# Patient Record
Sex: MALE | Race: WHITE | NOT HISPANIC OR LATINO | Employment: OTHER | URBAN - METROPOLITAN AREA
[De-identification: names, ages, dates, MRNs, and addresses within clinical notes are randomized per-mention and may not be internally consistent; named-entity substitution may affect disease eponyms.]

---

## 2017-08-24 DIAGNOSIS — Z11.59 NEED FOR HEPATITIS C SCREENING TEST: ICD-10-CM

## 2018-01-11 ENCOUNTER — DOCUMENTATION ONLY (OUTPATIENT)
Dept: FAMILY MEDICINE | Facility: CLINIC | Age: 53
End: 2018-01-11

## 2018-01-11 NOTE — PROGRESS NOTES
Pre-Visit Chart Review  For Appointment Scheduled on 1-17-18    Health Maintenance Due   Topic Date Due    Hepatitis C Screening  1965    Influenza Vaccine  08/01/2017    Lipid Panel  12/07/2017

## 2018-01-17 ENCOUNTER — DOCUMENTATION ONLY (OUTPATIENT)
Dept: FAMILY MEDICINE | Facility: CLINIC | Age: 53
End: 2018-01-17

## 2018-01-17 ENCOUNTER — TELEPHONE (OUTPATIENT)
Dept: FAMILY MEDICINE | Facility: CLINIC | Age: 53
End: 2018-01-17

## 2018-01-17 NOTE — TELEPHONE ENCOUNTER
Pt notified at 8:10 that clinic is closed due to the weather and that we will be calling back to reschedule appt. Pt asked if appt would be today and explained to him that it will most likely be a different day.  Pt voiced understanding.

## 2018-01-17 NOTE — PROGRESS NOTES
Pre-Visit Chart Review  For Appointment Scheduled on 1-18-18    Health Maintenance Due   Topic Date Due    Hepatitis C Screening  1965    Influenza Vaccine  08/01/2017    Lipid Panel  12/07/2017

## 2018-01-18 ENCOUNTER — OFFICE VISIT (OUTPATIENT)
Dept: FAMILY MEDICINE | Facility: CLINIC | Age: 53
End: 2018-01-18
Attending: FAMILY MEDICINE
Payer: COMMERCIAL

## 2018-01-18 VITALS
HEIGHT: 69 IN | WEIGHT: 170.19 LBS | DIASTOLIC BLOOD PRESSURE: 74 MMHG | HEART RATE: 54 BPM | RESPIRATION RATE: 12 BRPM | TEMPERATURE: 98 F | SYSTOLIC BLOOD PRESSURE: 130 MMHG | OXYGEN SATURATION: 98 % | BODY MASS INDEX: 25.21 KG/M2

## 2018-01-18 DIAGNOSIS — F41.9 ANXIETY: Primary | ICD-10-CM

## 2018-01-18 DIAGNOSIS — F40.240 CLAUSTROPHOBIA: ICD-10-CM

## 2018-01-18 PROCEDURE — 99213 OFFICE O/P EST LOW 20 MIN: CPT | Mod: S$GLB,,, | Performed by: FAMILY MEDICINE

## 2018-01-18 PROCEDURE — 99999 PR PBB SHADOW E&M-EST. PATIENT-LVL IV: CPT | Mod: PBBFAC,,, | Performed by: FAMILY MEDICINE

## 2018-01-18 RX ORDER — LEVETIRACETAM 500 MG/1
TABLET ORAL
COMMUNITY
Start: 2017-11-03 | End: 2018-01-18

## 2018-01-18 RX ORDER — MULTIVITAMIN
1 TABLET ORAL DAILY
COMMUNITY
End: 2022-02-15

## 2018-01-18 RX ORDER — ALPRAZOLAM 0.25 MG/1
0.25 TABLET ORAL 3 TIMES DAILY PRN
Qty: 40 TABLET | Refills: 0 | Status: SHIPPED | OUTPATIENT
Start: 2018-01-18 | End: 2019-06-25 | Stop reason: SDUPTHER

## 2018-01-18 NOTE — PATIENT INSTRUCTIONS
Controlling High Blood Pressure  High blood pressure (hypertension) is often called the silent killer. This is because many people who have it dont know it. High blood pressure is defined as 140/90 mm Hg or higher. Know your blood pressure and remember to check it regularly. Doing so can save your life. Here are some things you can do to help control your blood pressure.    Choose heart-healthy foods  · Select low-salt, low-fat foods. Limit sodium intake to 2,400 mg per day or the amount suggested by your healthcare provider.  · Limit canned, dried, cured, packaged, and fast foods. These can contain a lot of salt.  · Eat 8 to 10 servings of fruits and vegetables every day.  · Choose lean meats, fish, or chicken.  · Eat whole-grain pasta, brown rice, and beans.  · Eat 2 to 3 servings of low-fat or fat-free dairy products.  · Ask your doctor about the DASH eating plan. This plan helps reduce blood pressure.  · When you go to a restaurant, ask that your meal be prepared with no added salt.  Maintain a healthy weight  · Ask your healthcare provider how many calories to eat a day. Then stick to that number.  · Ask your healthcare provider what weight range is healthiest for you. If you are overweight, a weight loss of only 3% to 5% of your body weight can help lower blood pressure. Generally, a good weight loss goal is to lose 10% of your body weight in a year.  · Limit snacks and sweets.  · Get regular exercise.  Get up and get active  · Choose activities you enjoy. Find ones you can do with friends or family. This includes bicycling, dancing, walking, and jogging.  · Park farther away from building entrances.  · Use stairs instead of the elevator.  · When you can, walk or bike instead of driving.  · Ney leaves, garden, or do household repairs.  · Be active at a moderate to vigorous level of physical activity for at least 40 minutes for a minimum of 3 to 4 days a week.   Manage stress  · Make time to relax and enjoy  life. Find time to laugh.  · Communicate your concerns with your loved ones and your healthcare provider.  · Visit with family and friends, and keep up with hobbies.  Limit alcohol and quit smoking  · Men should have no more than 2 drinks per day.  · Women should have no more than 1 drink per day.  · Talk with your healthcare provider about quitting smoking. Smoking significantly increases your risk for heart disease and stroke. Ask your healthcare provider about community smoking cessation programs and other options.  Medicines  If lifestyle changes arent enough, your healthcare provider may prescribe high blood pressure medicine. Take all medicines as prescribed. If you have any questions about your medicines, ask your healthcare provider before stopping or changing them.   Date Last Reviewed: 4/27/2016  © 8023-8385 [employer]. [address] [place] [address], [place] [address]. All rights reserved. This information is not intended as a substitute for professional medical care. Always follow your healthcare professional's instructions.
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
## 2018-02-08 ENCOUNTER — OFFICE VISIT (OUTPATIENT)
Dept: FAMILY MEDICINE | Facility: CLINIC | Age: 53
End: 2018-02-08
Payer: COMMERCIAL

## 2018-02-08 VITALS
HEART RATE: 75 BPM | BODY MASS INDEX: 25.53 KG/M2 | RESPIRATION RATE: 20 BRPM | DIASTOLIC BLOOD PRESSURE: 69 MMHG | HEIGHT: 69 IN | SYSTOLIC BLOOD PRESSURE: 116 MMHG | WEIGHT: 172.38 LBS | TEMPERATURE: 100 F

## 2018-02-08 DIAGNOSIS — J10.1 INFLUENZA A: Primary | ICD-10-CM

## 2018-02-08 LAB
CTP QC/QA: YES
FLUAV AG NPH QL: POSITIVE
FLUBV AG NPH QL: NEGATIVE

## 2018-02-08 PROCEDURE — 99214 OFFICE O/P EST MOD 30 MIN: CPT | Mod: S$GLB,,, | Performed by: FAMILY MEDICINE

## 2018-02-08 PROCEDURE — 3008F BODY MASS INDEX DOCD: CPT | Mod: S$GLB,,, | Performed by: FAMILY MEDICINE

## 2018-02-08 PROCEDURE — 99999 PR PBB SHADOW E&M-EST. PATIENT-LVL III: CPT | Mod: PBBFAC,,, | Performed by: FAMILY MEDICINE

## 2018-02-08 PROCEDURE — 87804 INFLUENZA ASSAY W/OPTIC: CPT | Mod: 59,QW,S$GLB, | Performed by: FAMILY MEDICINE

## 2018-02-08 RX ORDER — OSELTAMIVIR PHOSPHATE 75 MG/1
75 CAPSULE ORAL 2 TIMES DAILY
Qty: 10 CAPSULE | Refills: 0 | Status: SHIPPED | OUTPATIENT
Start: 2018-02-08 | End: 2018-02-13

## 2018-02-08 NOTE — PROGRESS NOTES
Subjective:       Patient ID: Stefano Donahue Jr. is a 53 y.o. male.    Chief Complaint: Influenza    Son has flu now.      Influenza   This is a new problem. The current episode started yesterday. The problem occurs constantly. The problem has been unchanged. Associated symptoms include chills, congestion, coughing, fatigue, a fever, myalgias and a sore throat. Pertinent negatives include no abdominal pain, chest pain, nausea, numbness or rash.     Review of Systems   Constitutional: Positive for chills, fatigue and fever.   HENT: Positive for congestion and sore throat.    Respiratory: Positive for cough. Negative for shortness of breath.    Cardiovascular: Negative for chest pain.   Gastrointestinal: Negative for abdominal pain and nausea.   Musculoskeletal: Positive for myalgias.   Skin: Negative for rash.   Neurological: Negative for numbness.   All other systems reviewed and are negative.      Objective:      Physical Exam   Constitutional: He appears well-developed. No distress.   HENT:   Right Ear: Tympanic membrane normal.   Left Ear: Tympanic membrane normal.   Nose: Mucosal edema present.   Mouth/Throat: Posterior oropharyngeal erythema present.   Neck: Neck supple.   Cardiovascular: Normal rate and regular rhythm.    No murmur heard.  Pulmonary/Chest: Effort normal and breath sounds normal. He has no wheezes. He has no rales.   Lymphadenopathy:     He has no cervical adenopathy.   Skin: Skin is warm and dry.       Assessment:           Plan:     Stefano was seen today for influenza.    Diagnoses and all orders for this visit:    Influenza A  -     POCT Influenza A/B    Other orders  -     oseltamivir (TAMIFLU) 75 MG capsule; Take 1 capsule (75 mg total) by mouth 2 (two) times daily.

## 2018-02-09 ENCOUNTER — TELEPHONE (OUTPATIENT)
Dept: FAMILY MEDICINE | Facility: CLINIC | Age: 53
End: 2018-02-09

## 2018-02-09 NOTE — TELEPHONE ENCOUNTER
----- Message from Yolis Michael sent at 2/9/2018  4:20 PM CST -----  Contact: spouse  Kandis Donahue (spouse) [phone], Calling about the Tamiflu the patient was prescribed on 2/8/18. Returning the nurse's call. Please advise. Thanks.

## 2018-02-09 NOTE — TELEPHONE ENCOUNTER
----- Message from Adrián Chris sent at 2/9/2018 11:30 AM CST -----  Contact: wife Kanids  Wife Kandis  has a question regarding the tamiflu, please call back at [phone].

## 2018-02-10 NOTE — TELEPHONE ENCOUNTER
----- Message from Brittney Waller sent at 2/9/2018 12:41 PM CST -----  Contact: Patient  Patient is calling to find out the results of his test from yesterday.  Call Back#[phone]  Thanks

## 2019-06-24 ENCOUNTER — DOCUMENTATION ONLY (OUTPATIENT)
Dept: FAMILY MEDICINE | Facility: CLINIC | Age: 54
End: 2019-06-24

## 2019-06-24 NOTE — PROGRESS NOTES
Pre-Visit Chart Review  For Appointment Scheduled on 6-25-19    Health Maintenance Due   Topic Date Due    Hepatitis C Screening  1965    Lipid Panel  06/20/2019

## 2019-06-25 ENCOUNTER — OFFICE VISIT (OUTPATIENT)
Dept: FAMILY MEDICINE | Facility: CLINIC | Age: 54
End: 2019-06-25
Attending: FAMILY MEDICINE
Payer: COMMERCIAL

## 2019-06-25 VITALS
BODY MASS INDEX: 25.57 KG/M2 | TEMPERATURE: 99 F | WEIGHT: 172.63 LBS | RESPIRATION RATE: 12 BRPM | DIASTOLIC BLOOD PRESSURE: 70 MMHG | HEART RATE: 67 BPM | SYSTOLIC BLOOD PRESSURE: 130 MMHG | OXYGEN SATURATION: 95 % | HEIGHT: 69 IN

## 2019-06-25 DIAGNOSIS — F40.240 CLAUSTROPHOBIA: ICD-10-CM

## 2019-06-25 DIAGNOSIS — F41.9 ANXIETY: Primary | ICD-10-CM

## 2019-06-25 DIAGNOSIS — E78.5 HYPERLIPIDEMIA, UNSPECIFIED HYPERLIPIDEMIA TYPE: ICD-10-CM

## 2019-06-25 DIAGNOSIS — Z23 IMMUNIZATION DUE: ICD-10-CM

## 2019-06-25 PROCEDURE — 3008F BODY MASS INDEX DOCD: CPT | Mod: CPTII,S$GLB,, | Performed by: FAMILY MEDICINE

## 2019-06-25 PROCEDURE — 3008F PR BODY MASS INDEX (BMI) DOCUMENTED: ICD-10-PCS | Mod: CPTII,S$GLB,, | Performed by: FAMILY MEDICINE

## 2019-06-25 PROCEDURE — 99214 PR OFFICE/OUTPT VISIT, EST, LEVL IV, 30-39 MIN: ICD-10-PCS | Mod: S$GLB,,, | Performed by: FAMILY MEDICINE

## 2019-06-25 PROCEDURE — 99999 PR PBB SHADOW E&M-EST. PATIENT-LVL III: ICD-10-PCS | Mod: PBBFAC,,, | Performed by: FAMILY MEDICINE

## 2019-06-25 PROCEDURE — 99999 PR PBB SHADOW E&M-EST. PATIENT-LVL III: CPT | Mod: PBBFAC,,, | Performed by: FAMILY MEDICINE

## 2019-06-25 PROCEDURE — 99214 OFFICE O/P EST MOD 30 MIN: CPT | Mod: S$GLB,,, | Performed by: FAMILY MEDICINE

## 2019-06-25 RX ORDER — ALPRAZOLAM 0.25 MG/1
0.25 TABLET ORAL 3 TIMES DAILY PRN
Qty: 40 TABLET | Refills: 0 | Status: SHIPPED | OUTPATIENT
Start: 2019-06-25 | End: 2020-09-01
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
## 2020-07-13 ENCOUNTER — TELEPHONE (OUTPATIENT)
Dept: FAMILY MEDICINE | Facility: CLINIC | Age: 55
End: 2020-07-13

## 2020-07-13 NOTE — TELEPHONE ENCOUNTER
----- Message from Lenore Vann sent at 7/13/2020 11:29 AM CDT -----  Type: Needs Medical Advice  Who Called:  patient  Best Call Back Number: [phone]  Additional Information: patient requesting orders for stress test. Please give call back

## 2020-07-13 NOTE — TELEPHONE ENCOUNTER
Patient says it's been more than 10 years since he had a stress test. Wants one done for preventative medicine to make sure there is nothing wrong with his heart.

## 2020-09-01 ENCOUNTER — OFFICE VISIT (OUTPATIENT)
Dept: FAMILY MEDICINE | Facility: CLINIC | Age: 55
End: 2020-09-01
Payer: COMMERCIAL

## 2020-09-01 VITALS
DIASTOLIC BLOOD PRESSURE: 74 MMHG | WEIGHT: 179.88 LBS | OXYGEN SATURATION: 98 % | SYSTOLIC BLOOD PRESSURE: 116 MMHG | TEMPERATURE: 98 F | HEART RATE: 65 BPM | HEIGHT: 69 IN | BODY MASS INDEX: 26.64 KG/M2

## 2020-09-01 DIAGNOSIS — Z76.0 MEDICATION REFILL: ICD-10-CM

## 2020-09-01 DIAGNOSIS — F41.9 ANXIETY: ICD-10-CM

## 2020-09-01 DIAGNOSIS — F40.240 CLAUSTROPHOBIA: Primary | ICD-10-CM

## 2020-09-01 PROCEDURE — 3008F BODY MASS INDEX DOCD: CPT | Mod: CPTII,S$GLB,, | Performed by: STUDENT IN AN ORGANIZED HEALTH CARE EDUCATION/TRAINING PROGRAM

## 2020-09-01 PROCEDURE — 99999 PR PBB SHADOW E&M-EST. PATIENT-LVL IV: CPT | Mod: PBBFAC,,, | Performed by: STUDENT IN AN ORGANIZED HEALTH CARE EDUCATION/TRAINING PROGRAM

## 2020-09-01 PROCEDURE — 99213 PR OFFICE/OUTPT VISIT, EST, LEVL III, 20-29 MIN: ICD-10-PCS | Mod: S$GLB,,, | Performed by: STUDENT IN AN ORGANIZED HEALTH CARE EDUCATION/TRAINING PROGRAM

## 2020-09-01 PROCEDURE — 99213 OFFICE O/P EST LOW 20 MIN: CPT | Mod: S$GLB,,, | Performed by: STUDENT IN AN ORGANIZED HEALTH CARE EDUCATION/TRAINING PROGRAM

## 2020-09-01 PROCEDURE — 99999 PR PBB SHADOW E&M-EST. PATIENT-LVL IV: ICD-10-PCS | Mod: PBBFAC,,, | Performed by: STUDENT IN AN ORGANIZED HEALTH CARE EDUCATION/TRAINING PROGRAM

## 2020-09-01 PROCEDURE — 3008F PR BODY MASS INDEX (BMI) DOCUMENTED: ICD-10-PCS | Mod: CPTII,S$GLB,, | Performed by: STUDENT IN AN ORGANIZED HEALTH CARE EDUCATION/TRAINING PROGRAM

## 2020-09-01 RX ORDER — ALPRAZOLAM 0.25 MG/1
TABLET ORAL 3 TIMES DAILY
COMMUNITY
End: 2020-09-01 | Stop reason: SDUPTHER

## 2020-09-01 RX ORDER — ALPRAZOLAM 0.25 MG/1
0.25 TABLET ORAL 3 TIMES DAILY PRN
Qty: 21 TABLET | Refills: 0 | Status: SHIPPED | OUTPATIENT
Start: 2020-09-01 | End: 2021-08-04 | Stop reason: SDUPTHER
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
## 2021-05-06 ENCOUNTER — PATIENT MESSAGE (OUTPATIENT)
Dept: RESEARCH | Facility: HOSPITAL | Age: 56
End: 2021-05-06

## 2021-05-10 ENCOUNTER — TELEPHONE (OUTPATIENT)
Dept: FAMILY MEDICINE | Facility: CLINIC | Age: 56
End: 2021-05-10

## 2021-05-11 ENCOUNTER — OFFICE VISIT (OUTPATIENT)
Dept: FAMILY MEDICINE | Facility: CLINIC | Age: 56
End: 2021-05-11
Payer: COMMERCIAL

## 2021-05-11 VITALS
SYSTOLIC BLOOD PRESSURE: 138 MMHG | DIASTOLIC BLOOD PRESSURE: 80 MMHG | TEMPERATURE: 98 F | HEIGHT: 69 IN | WEIGHT: 184.06 LBS | OXYGEN SATURATION: 97 % | HEART RATE: 58 BPM | BODY MASS INDEX: 27.26 KG/M2

## 2021-05-11 DIAGNOSIS — H93.12 RINGING IN EAR, LEFT: Primary | ICD-10-CM

## 2021-05-11 DIAGNOSIS — H91.90 PERCEIVED HEARING CHANGES: ICD-10-CM

## 2021-05-11 PROCEDURE — 99213 PR OFFICE/OUTPT VISIT, EST, LEVL III, 20-29 MIN: ICD-10-PCS | Mod: S$GLB,,, | Performed by: NURSE PRACTITIONER

## 2021-05-11 PROCEDURE — 99999 PR PBB SHADOW E&M-EST. PATIENT-LVL IV: ICD-10-PCS | Mod: PBBFAC,,, | Performed by: NURSE PRACTITIONER

## 2021-05-11 PROCEDURE — 3008F PR BODY MASS INDEX (BMI) DOCUMENTED: ICD-10-PCS | Mod: CPTII,S$GLB,, | Performed by: NURSE PRACTITIONER

## 2021-05-11 PROCEDURE — 1126F AMNT PAIN NOTED NONE PRSNT: CPT | Mod: S$GLB,,, | Performed by: NURSE PRACTITIONER

## 2021-05-11 PROCEDURE — 3008F BODY MASS INDEX DOCD: CPT | Mod: CPTII,S$GLB,, | Performed by: NURSE PRACTITIONER

## 2021-05-11 PROCEDURE — 1126F PR PAIN SEVERITY QUANTIFIED, NO PAIN PRESENT: ICD-10-PCS | Mod: S$GLB,,, | Performed by: NURSE PRACTITIONER

## 2021-05-11 PROCEDURE — 99213 OFFICE O/P EST LOW 20 MIN: CPT | Mod: S$GLB,,, | Performed by: NURSE PRACTITIONER

## 2021-05-11 PROCEDURE — 99999 PR PBB SHADOW E&M-EST. PATIENT-LVL IV: CPT | Mod: PBBFAC,,, | Performed by: NURSE PRACTITIONER

## 2021-05-11 RX ORDER — LEVETIRACETAM 500 MG/1
500 TABLET ORAL DAILY
COMMUNITY
Start: 2021-03-08

## 2021-08-04 ENCOUNTER — TELEPHONE (OUTPATIENT)
Dept: FAMILY MEDICINE | Facility: CLINIC | Age: 56
End: 2021-08-04

## 2021-08-04 DIAGNOSIS — Z76.0 MEDICATION REFILL: ICD-10-CM

## 2021-08-04 DIAGNOSIS — F41.9 ANXIETY: ICD-10-CM

## 2021-08-04 DIAGNOSIS — F40.240 CLAUSTROPHOBIA: ICD-10-CM

## 2021-08-04 RX ORDER — ALPRAZOLAM 0.25 MG/1
0.25 TABLET ORAL 2 TIMES DAILY PRN
Qty: 30 TABLET | Refills: 0 | Status: SHIPPED | OUTPATIENT
Start: 2021-08-04 | End: 2021-08-04

## 2021-08-04 RX ORDER — ALPRAZOLAM 0.25 MG/1
0.25 TABLET ORAL 2 TIMES DAILY PRN
Qty: 30 TABLET | Refills: 0 | Status: ON HOLD | OUTPATIENT
Start: 2021-08-04 | End: 2022-01-17 | Stop reason: HOSPADM

## 2022-01-04 ENCOUNTER — TELEPHONE (OUTPATIENT)
Dept: FAMILY MEDICINE | Facility: CLINIC | Age: 57
End: 2022-01-04
Payer: COMMERCIAL

## 2022-01-04 PROCEDURE — 99285 EMERGENCY DEPT VISIT HI MDM: CPT | Mod: 25

## 2022-01-04 PROCEDURE — 96374 THER/PROPH/DIAG INJ IV PUSH: CPT

## 2022-01-04 RX ORDER — ALBUTEROL SULFATE 90 UG/1
4 AEROSOL, METERED RESPIRATORY (INHALATION) ONCE
Status: COMPLETED | OUTPATIENT
Start: 2022-01-05 | End: 2022-01-05

## 2022-01-04 RX ORDER — DEXAMETHASONE SODIUM PHOSPHATE 4 MG/ML
8 INJECTION, SOLUTION INTRA-ARTICULAR; INTRALESIONAL; INTRAMUSCULAR; INTRAVENOUS; SOFT TISSUE
Status: COMPLETED | OUTPATIENT
Start: 2022-01-05 | End: 2022-01-05

## 2022-01-04 NOTE — TELEPHONE ENCOUNTER
I spoke with pt via phone, pt states that he tested positive on 12/28/2021 and he is still had symptoms on Sunday. I advised him that this visit would have to be virtual. All understanding verbalized.     ----- Message from Syl Up sent at 1/4/2022  9:36 AM CST -----  Type:  Same Day Appointment Request    Caller is requesting a same day appointment.  Caller declined first available appointment listed below.      Name of Caller:  patient   When is the first available appointment?  Unavailable   Symptoms:  ear ache   Best Call Back Number:     Additional Information:   please advise-thank you

## 2022-01-05 ENCOUNTER — HOSPITAL ENCOUNTER (INPATIENT)
Facility: HOSPITAL | Age: 57
LOS: 12 days | Discharge: HOME | DRG: 177 | End: 2022-01-17
Attending: EMERGENCY MEDICINE | Admitting: INTERNAL MEDICINE
Payer: COMMERCIAL

## 2022-01-05 DIAGNOSIS — U07.1 PNEUMONIA DUE TO COVID-19 VIRUS: Primary | ICD-10-CM

## 2022-01-05 DIAGNOSIS — U07.1 COVID-19: ICD-10-CM

## 2022-01-05 DIAGNOSIS — J12.82 PNEUMONIA DUE TO COVID-19 VIRUS: Primary | ICD-10-CM

## 2022-01-05 PROBLEM — R56.9 SEIZURES: Status: ACTIVE | Noted: 2022-01-05

## 2022-01-05 PROBLEM — J96.01 ACUTE RESPIRATORY FAILURE WITH HYPOXIA: Status: ACTIVE | Noted: 2022-01-05

## 2022-01-05 LAB
ALBUMIN SERPL BCP-MCNC: 3.4 G/DL (ref 3.5–5.2)
ALP SERPL-CCNC: 118 U/L (ref 55–135)
ALT SERPL W/O P-5'-P-CCNC: 66 U/L (ref 10–44)
ANION GAP SERPL CALC-SCNC: 12 MMOL/L (ref 8–16)
AST SERPL-CCNC: 74 U/L (ref 10–40)
BACTERIA #/AREA URNS HPF: NORMAL /HPF
BASOPHILS # BLD AUTO: 0 K/UL (ref 0–0.2)
BASOPHILS NFR BLD: 0 % (ref 0–1.9)
BILIRUB SERPL-MCNC: 0.5 MG/DL (ref 0.1–1)
BILIRUB UR QL STRIP: NEGATIVE
BNP SERPL-MCNC: <10 PG/ML (ref 0–99)
BUN SERPL-MCNC: 18 MG/DL (ref 6–20)
CALCIUM SERPL-MCNC: 8.8 MG/DL (ref 8.7–10.5)
CHLORIDE SERPL-SCNC: 100 MMOL/L (ref 95–110)
CLARITY UR: CLEAR
CO2 SERPL-SCNC: 22 MMOL/L (ref 23–29)
COLOR UR: YELLOW
CREAT SERPL-MCNC: 1 MG/DL (ref 0.5–1.4)
CRP SERPL-MCNC: 76.2 MG/L (ref 0–8.2)
D DIMER PPP IA.FEU-MCNC: 0.5 MG/L FEU
DIFFERENTIAL METHOD: ABNORMAL
EOSINOPHIL # BLD AUTO: 0 K/UL (ref 0–0.5)
EOSINOPHIL NFR BLD: 0 % (ref 0–8)
ERYTHROCYTE [DISTWIDTH] IN BLOOD BY AUTOMATED COUNT: 11.9 % (ref 11.5–14.5)
ERYTHROCYTE [SEDIMENTATION RATE] IN BLOOD BY WESTERGREN METHOD: 45 MM/HR (ref 0–10)
EST. GFR  (AFRICAN AMERICAN): >60 ML/MIN/1.73 M^2
EST. GFR  (NON AFRICAN AMERICAN): >60 ML/MIN/1.73 M^2
FERRITIN SERPL-MCNC: 2439 NG/ML (ref 20–300)
GLUCOSE SERPL-MCNC: 146 MG/DL (ref 70–110)
GLUCOSE UR QL STRIP: ABNORMAL
HCT VFR BLD AUTO: 44.7 % (ref 40–54)
HGB BLD-MCNC: 15.1 G/DL (ref 14–18)
HGB UR QL STRIP: ABNORMAL
HYALINE CASTS #/AREA URNS LPF: 0 /LPF
IMM GRANULOCYTES # BLD AUTO: 0.01 K/UL (ref 0–0.04)
IMM GRANULOCYTES NFR BLD AUTO: 0.3 % (ref 0–0.5)
KETONES UR QL STRIP: NEGATIVE
LACTATE SERPL-SCNC: 1.1 MMOL/L (ref 0.5–2.2)
LDH SERPL L TO P-CCNC: 466 U/L (ref 110–260)
LEUKOCYTE ESTERASE UR QL STRIP: NEGATIVE
LYMPHOCYTES # BLD AUTO: 0.9 K/UL (ref 1–4.8)
LYMPHOCYTES NFR BLD: 25.3 % (ref 18–48)
MCH RBC QN AUTO: 29.2 PG (ref 27–31)
MCHC RBC AUTO-ENTMCNC: 33.8 G/DL (ref 32–36)
MCV RBC AUTO: 87 FL (ref 82–98)
MICROSCOPIC COMMENT: NORMAL
MONOCYTES # BLD AUTO: 0.3 K/UL (ref 0.3–1)
MONOCYTES NFR BLD: 8.8 % (ref 4–15)
NEUTROPHILS # BLD AUTO: 2.4 K/UL (ref 1.8–7.7)
NEUTROPHILS NFR BLD: 65.6 % (ref 38–73)
NITRITE UR QL STRIP: NEGATIVE
NRBC BLD-RTO: 0 /100 WBC
PH UR STRIP: 7 [PH] (ref 5–8)
PLATELET # BLD AUTO: 162 K/UL (ref 150–450)
PMV BLD AUTO: 9.1 FL (ref 9.2–12.9)
POTASSIUM SERPL-SCNC: 3.6 MMOL/L (ref 3.5–5.1)
PROCALCITONIN SERPL IA-MCNC: 0.07 NG/ML
PROT SERPL-MCNC: 7.2 G/DL (ref 6–8.4)
PROT UR QL STRIP: ABNORMAL
RBC # BLD AUTO: 5.17 M/UL (ref 4.6–6.2)
RBC #/AREA URNS HPF: 1 /HPF (ref 0–4)
SODIUM SERPL-SCNC: 134 MMOL/L (ref 136–145)
SP GR UR STRIP: 1.02 (ref 1–1.03)
SQUAMOUS #/AREA URNS HPF: 1 /HPF
TROPONIN I SERPL DL<=0.01 NG/ML-MCNC: 0.01 NG/ML (ref 0–0.03)
URN SPEC COLLECT METH UR: ABNORMAL
UROBILINOGEN UR STRIP-ACNC: ABNORMAL EU/DL
WBC # BLD AUTO: 3.63 K/UL (ref 3.9–12.7)
WBC #/AREA URNS HPF: 5 /HPF (ref 0–5)

## 2022-01-05 PROCEDURE — 25000242 PHARM REV CODE 250 ALT 637 W/ HCPCS: Performed by: PHYSICIAN ASSISTANT

## 2022-01-05 PROCEDURE — 84145 PROCALCITONIN (PCT): CPT | Performed by: HOSPITALIST

## 2022-01-05 PROCEDURE — 93005 ELECTROCARDIOGRAM TRACING: CPT

## 2022-01-05 PROCEDURE — 83880 ASSAY OF NATRIURETIC PEPTIDE: CPT | Performed by: NURSE PRACTITIONER

## 2022-01-05 PROCEDURE — 83615 LACTATE (LD) (LDH) ENZYME: CPT | Performed by: NURSE PRACTITIONER

## 2022-01-05 PROCEDURE — 27000221 HC OXYGEN, UP TO 24 HOURS

## 2022-01-05 PROCEDURE — 93010 EKG 12-LEAD: ICD-10-PCS | Mod: ,,, | Performed by: INTERNAL MEDICINE

## 2022-01-05 PROCEDURE — 84484 ASSAY OF TROPONIN QUANT: CPT | Performed by: NURSE PRACTITIONER

## 2022-01-05 PROCEDURE — 12000002 HC ACUTE/MED SURGE SEMI-PRIVATE ROOM

## 2022-01-05 PROCEDURE — 94640 AIRWAY INHALATION TREATMENT: CPT

## 2022-01-05 PROCEDURE — 86140 C-REACTIVE PROTEIN: CPT | Performed by: HOSPITALIST

## 2022-01-05 PROCEDURE — 83605 ASSAY OF LACTIC ACID: CPT | Performed by: NURSE PRACTITIONER

## 2022-01-05 PROCEDURE — 85651 RBC SED RATE NONAUTOMATED: CPT | Performed by: NURSE PRACTITIONER

## 2022-01-05 PROCEDURE — 36415 COLL VENOUS BLD VENIPUNCTURE: CPT | Performed by: HOSPITALIST

## 2022-01-05 PROCEDURE — 85379 FIBRIN DEGRADATION QUANT: CPT | Performed by: HOSPITALIST

## 2022-01-05 PROCEDURE — 93010 ELECTROCARDIOGRAM REPORT: CPT | Mod: ,,, | Performed by: INTERNAL MEDICINE

## 2022-01-05 PROCEDURE — 63600175 PHARM REV CODE 636 W HCPCS: Performed by: PHYSICIAN ASSISTANT

## 2022-01-05 PROCEDURE — 36415 COLL VENOUS BLD VENIPUNCTURE: CPT | Performed by: NURSE PRACTITIONER

## 2022-01-05 PROCEDURE — C9399 UNCLASSIFIED DRUGS OR BIOLOG: HCPCS | Performed by: NURSE PRACTITIONER

## 2022-01-05 PROCEDURE — 27000207 HC ISOLATION

## 2022-01-05 PROCEDURE — 25000242 PHARM REV CODE 250 ALT 637 W/ HCPCS: Performed by: NURSE PRACTITIONER

## 2022-01-05 PROCEDURE — 94761 N-INVAS EAR/PLS OXIMETRY MLT: CPT

## 2022-01-05 PROCEDURE — 85025 COMPLETE CBC W/AUTO DIFF WBC: CPT | Performed by: PHYSICIAN ASSISTANT

## 2022-01-05 PROCEDURE — 81000 URINALYSIS NONAUTO W/SCOPE: CPT | Performed by: NURSE PRACTITIONER

## 2022-01-05 PROCEDURE — 36415 COLL VENOUS BLD VENIPUNCTURE: CPT | Performed by: PHYSICIAN ASSISTANT

## 2022-01-05 PROCEDURE — 80053 COMPREHEN METABOLIC PANEL: CPT | Performed by: PHYSICIAN ASSISTANT

## 2022-01-05 PROCEDURE — 25000003 PHARM REV CODE 250: Performed by: NURSE PRACTITIONER

## 2022-01-05 PROCEDURE — 63600175 PHARM REV CODE 636 W HCPCS: Performed by: NURSE PRACTITIONER

## 2022-01-05 PROCEDURE — 82728 ASSAY OF FERRITIN: CPT | Performed by: NURSE PRACTITIONER

## 2022-01-05 RX ORDER — IBUPROFEN 200 MG
24 TABLET ORAL
Status: DISCONTINUED | OUTPATIENT
Start: 2022-01-05 | End: 2022-01-17 | Stop reason: HOSPADM

## 2022-01-05 RX ORDER — GLUCAGON 1 MG
1 KIT INJECTION
Status: DISCONTINUED | OUTPATIENT
Start: 2022-01-05 | End: 2022-01-17 | Stop reason: HOSPADM

## 2022-01-05 RX ORDER — LANOLIN ALCOHOL/MO/W.PET/CERES
800 CREAM (GRAM) TOPICAL
Status: DISCONTINUED | OUTPATIENT
Start: 2022-01-05 | End: 2022-01-17 | Stop reason: HOSPADM

## 2022-01-05 RX ORDER — TALC
6 POWDER (GRAM) TOPICAL NIGHTLY PRN
Status: DISCONTINUED | OUTPATIENT
Start: 2022-01-05 | End: 2022-01-17 | Stop reason: HOSPADM

## 2022-01-05 RX ORDER — ACETAMINOPHEN 325 MG/1
650 TABLET ORAL EVERY 4 HOURS PRN
Status: DISCONTINUED | OUTPATIENT
Start: 2022-01-05 | End: 2022-01-06

## 2022-01-05 RX ORDER — IBUPROFEN 200 MG
16 TABLET ORAL
Status: DISCONTINUED | OUTPATIENT
Start: 2022-01-05 | End: 2022-01-17 | Stop reason: HOSPADM

## 2022-01-05 RX ORDER — ASCORBIC ACID 500 MG
500 TABLET ORAL 2 TIMES DAILY
Status: DISCONTINUED | OUTPATIENT
Start: 2022-01-05 | End: 2022-01-17 | Stop reason: HOSPADM

## 2022-01-05 RX ORDER — LEVETIRACETAM 500 MG/1
500 TABLET ORAL 2 TIMES DAILY
Status: DISCONTINUED | OUTPATIENT
Start: 2022-01-05 | End: 2022-01-17 | Stop reason: HOSPADM

## 2022-01-05 RX ORDER — ALBUTEROL SULFATE 90 UG/1
2 AEROSOL, METERED RESPIRATORY (INHALATION) EVERY 6 HOURS
Status: DISCONTINUED | OUTPATIENT
Start: 2022-01-05 | End: 2022-01-11

## 2022-01-05 RX ORDER — SODIUM CHLORIDE 0.9 % (FLUSH) 0.9 %
10 SYRINGE (ML) INJECTION
Status: DISCONTINUED | OUTPATIENT
Start: 2022-01-05 | End: 2022-01-17 | Stop reason: HOSPADM

## 2022-01-05 RX ORDER — ONDANSETRON 2 MG/ML
4 INJECTION INTRAMUSCULAR; INTRAVENOUS EVERY 8 HOURS PRN
Status: DISCONTINUED | OUTPATIENT
Start: 2022-01-05 | End: 2022-01-17 | Stop reason: HOSPADM

## 2022-01-05 RX ORDER — ATORVASTATIN CALCIUM 20 MG/1
20 TABLET, FILM COATED ORAL DAILY
Status: DISCONTINUED | OUTPATIENT
Start: 2022-01-05 | End: 2022-01-17 | Stop reason: HOSPADM

## 2022-01-05 RX ORDER — GUAIFENESIN/DEXTROMETHORPHAN 100-10MG/5
10 SYRUP ORAL EVERY 4 HOURS PRN
Status: DISCONTINUED | OUTPATIENT
Start: 2022-01-05 | End: 2022-01-17 | Stop reason: HOSPADM

## 2022-01-05 RX ORDER — ENOXAPARIN SODIUM 100 MG/ML
1 INJECTION SUBCUTANEOUS 2 TIMES DAILY
Status: DISCONTINUED | OUTPATIENT
Start: 2022-01-05 | End: 2022-01-17 | Stop reason: HOSPADM

## 2022-01-05 RX ADMIN — ENOXAPARIN SODIUM 80 MG: 80 INJECTION SUBCUTANEOUS at 08:01

## 2022-01-05 RX ADMIN — POTASSIUM BICARBONATE 50 MEQ: 978 TABLET, EFFERVESCENT ORAL at 08:01

## 2022-01-05 RX ADMIN — ALBUTEROL SULFATE 4 PUFF: 90 AEROSOL, METERED RESPIRATORY (INHALATION) at 12:01

## 2022-01-05 RX ADMIN — DEXAMETHASONE 6 MG: 4 TABLET ORAL at 08:01

## 2022-01-05 RX ADMIN — DEXAMETHASONE SODIUM PHOSPHATE 8 MG: 4 INJECTION, SOLUTION INTRA-ARTICULAR; INTRALESIONAL; INTRAMUSCULAR; INTRAVENOUS; SOFT TISSUE at 12:01

## 2022-01-05 RX ADMIN — ALBUTEROL SULFATE 2 PUFF: 90 AEROSOL, METERED RESPIRATORY (INHALATION) at 07:01

## 2022-01-05 RX ADMIN — REMDESIVIR 200 MG: 100 INJECTION, POWDER, LYOPHILIZED, FOR SOLUTION INTRAVENOUS at 08:01

## 2022-01-05 RX ADMIN — LEVETIRACETAM 500 MG: 500 TABLET, FILM COATED ORAL at 08:01

## 2022-01-05 RX ADMIN — ATORVASTATIN CALCIUM 20 MG: 20 TABLET, FILM COATED ORAL at 08:01

## 2022-01-05 RX ADMIN — ACETAMINOPHEN 650 MG: 325 TABLET ORAL at 12:01

## 2022-01-05 RX ADMIN — OXYCODONE HYDROCHLORIDE AND ACETAMINOPHEN 500 MG: 500 TABLET ORAL at 08:01

## 2022-01-05 RX ADMIN — ALBUTEROL SULFATE 2 PUFF: 90 AEROSOL, METERED RESPIRATORY (INHALATION) at 12:01

## 2022-01-05 RX ADMIN — THERA TABS 1 TABLET: TAB at 08:01

## 2022-01-05 NOTE — SUBJECTIVE & OBJECTIVE
Past Medical History:   Diagnosis Date    Anxiety     flying    Claustrophobia     Dizziness     Hyperlipidemia        Past Surgical History:   Procedure Laterality Date    APPENDECTOMY      COLONOSCOPY  2013    Dr Cheo lucero 5 years    VASECTOMY         Review of patient's allergies indicates:   Allergen Reactions    Codeine Anaphylaxis       No current facility-administered medications on file prior to encounter.     Current Outpatient Medications on File Prior to Encounter   Medication Sig    ALPRAZolam (XANAX) 0.25 MG tablet Take 1 tablet (0.25 mg total) by mouth 2 (two) times daily as needed for Anxiety.    levETIRAcetam (KEPPRA) 500 MG Tab     multivitamin (ONE DAILY MULTIVITAMIN) per tablet Take 1 tablet by mouth once daily.    simvastatin (ZOCOR) 80 MG tablet Take 40 mg by mouth nightly.      Family History     Problem Relation (Age of Onset)    Cancer Mother    Early death Mother    No Known Problems Father, Sister, Brother, Daughter, Son, Maternal Aunt, Maternal Uncle, Maternal Grandmother    Stroke Paternal Grandfather        Tobacco Use    Smoking status: Former Smoker     Packs/day: 0.25     Years: 19.00     Pack years: 4.75     Types: Cigarettes     Quit date: 2013     Years since quittin.6    Smokeless tobacco: Never Used    Tobacco comment: Quitting currently    Substance and Sexual Activity    Alcohol use: No     Alcohol/week: 0.0 standard drinks    Drug use: No    Sexual activity: Not on file     Review of Systems   Constitutional: Negative for chills and fever.   HENT: Negative for congestion and sore throat.    Respiratory: Positive for cough. Negative for shortness of breath.    Cardiovascular: Negative for chest pain and palpitations.   Gastrointestinal: Negative for abdominal pain, diarrhea, nausea and vomiting.   Genitourinary: Negative for flank pain and hematuria.   Musculoskeletal: Negative for neck pain and neck stiffness.   Skin: Negative for pallor and  rash.   Neurological: Positive for headaches. Negative for weakness.   Psychiatric/Behavioral: Negative for agitation and confusion.   All other systems reviewed and are negative.    Objective:     Vital Signs (Most Recent):  Temp: 99.6 °F (37.6 °C) (01/04/22 2346)  Pulse: 90 (01/05/22 0056)  Resp: (!) 26 (01/05/22 0056)  BP: 129/81 (01/04/22 2346)  SpO2: 97 % (01/05/22 0056) Vital Signs (24h Range):  Temp:  [99.6 °F (37.6 °C)] 99.6 °F (37.6 °C)  Pulse:  [86-90] 90  Resp:  [18-26] 26  SpO2:  [93 %-97 %] 97 %  BP: (129)/(81) 129/81     Weight: 81.6 kg (180 lb)  Body mass index is 26.58 kg/m².    Physical Exam  Constitutional:       General: He is not in acute distress.     Appearance: He is well-developed and well-nourished.   HENT:      Head: Normocephalic and atraumatic.   Eyes:      Extraocular Movements: EOM normal.      Conjunctiva/sclera: Conjunctivae normal.      Pupils: Pupils are equal, round, and reactive to light.   Cardiovascular:      Rate and Rhythm: Regular rhythm. Tachycardia present.      Pulses: Intact distal pulses.      Heart sounds: Normal heart sounds.   Pulmonary:      Effort: Pulmonary effort is normal. Tachypnea present.      Breath sounds: Decreased breath sounds present.   Abdominal:      General: Bowel sounds are normal. There is no distension.      Palpations: Abdomen is soft.      Tenderness: There is no abdominal tenderness.   Musculoskeletal:         General: No edema. Normal range of motion.      Cervical back: Normal range of motion and neck supple.   Skin:     General: Skin is warm and dry.   Neurological:      Mental Status: He is alert and oriented to person, place, and time.   Psychiatric:         Mood and Affect: Mood and affect normal.         Behavior: Behavior normal.           CRANIAL NERVES     CN III, IV, VI   Pupils are equal, round, and reactive to light.  Extraocular motions are normal.        Significant Labs:   All pertinent labs within the past 24 hours have been  reviewed.  CBC:   Recent Labs   Lab 01/05/22 0048   WBC 3.63*   HGB 15.1   HCT 44.7        CMP:   Recent Labs   Lab 01/05/22 0048   *   K 3.6      CO2 22*   *   BUN 18   CREATININE 1.0   CALCIUM 8.8   PROT 7.2   ALBUMIN 3.4*   BILITOT 0.5   ALKPHOS 118   AST 74*   ALT 66*   ANIONGAP 12   EGFRNONAA >60       Significant Imaging: I have reviewed all pertinent imaging results/findings within the past 24 hours.

## 2022-01-05 NOTE — H&P
[facility] Medicine  History & Physical    Patient Name: Stefano Donahue Jr.  MRN: [id number]  Patient Class: OP- Observation  Admission Date: 1/5/2022  Attending Physician: Hina Lopez MD  Primary Care Provider: Wilber White MD         Patient information was obtained from patient, spouse/SO and ER records.     Subjective:     Principal Problem:Pneumonia due to COVID-19 virus    Chief Complaint:   Chief Complaint   Patient presents with    Hypoxia     Wife reports oxygen saturations at home were 88%. Patient reports he was seen at Urgent Care today and had an xray. Tested positive last Tuesday        HPI: Stefano Donahue Jr. Is a 57 y/o male with a past medical history significant for HLD and seizures who presented to the ED for further evaluation of hypoxia.  He was diagnosed with COVID-19 one week ago.  His wife purchased a pulse ox in order to check the patient's oxygen saturation and today was noted to have a sat of 88%.  He was evaluated in the urgent care and thought to have pneumonia so he was sent to the ED.  During my interview, the patient's oxygen saturation was noted at 88-89% on room air and he was placed on supplemental oxygen.  Denies feeling SOB or chest pain, no nausea or vomiting.  He is placed in observation under the service of hospital medicine for continued medical management.         Past Medical History:   Diagnosis Date    Anxiety     flying    Claustrophobia     Dizziness     Hyperlipidemia        Past Surgical History:   Procedure Laterality Date    APPENDECTOMY      COLONOSCOPY  04/22/2013    Dr Cheo lucero 5 years    VASECTOMY         Review of patient's allergies indicates:   Allergen Reactions    Codeine Anaphylaxis       No current facility-administered medications on file prior to encounter.     Current Outpatient Medications on File Prior to Encounter   Medication Sig    ALPRAZolam (XANAX) 0.25 MG tablet Take 1 tablet (0.25 mg total)  by mouth 2 (two) times daily as needed for Anxiety.    levETIRAcetam (KEPPRA) 500 MG Tab     multivitamin (ONE DAILY MULTIVITAMIN) per tablet Take 1 tablet by mouth once daily.    simvastatin (ZOCOR) 80 MG tablet Take 40 mg by mouth nightly.      Family History     Problem Relation (Age of Onset)    Cancer Mother    Early death Mother    No Known Problems Father, Sister, Brother, Daughter, Son, Maternal Aunt, Maternal Uncle, Maternal Grandmother    Stroke Paternal Grandfather        Tobacco Use    Smoking status: Former Smoker     Packs/day: 0.25     Years: 19.00     Pack years: 4.75     Types: Cigarettes     Quit date: 2013     Years since quittin.6    Smokeless tobacco: Never Used    Tobacco comment: Quitting currently    Substance and Sexual Activity    Alcohol use: No     Alcohol/week: 0.0 standard drinks    Drug use: No    Sexual activity: Not on file     Review of Systems   Constitutional: Negative for chills and fever.   HENT: Negative for congestion and sore throat.    Respiratory: Positive for cough. Negative for shortness of breath.    Cardiovascular: Negative for chest pain and palpitations.   Gastrointestinal: Negative for abdominal pain, diarrhea, nausea and vomiting.   Genitourinary: Negative for flank pain and hematuria.   Musculoskeletal: Negative for neck pain and neck stiffness.   Skin: Negative for pallor and rash.   Neurological: Positive for headaches. Negative for weakness.   Psychiatric/Behavioral: Negative for agitation and confusion.   All other systems reviewed and are negative.    Objective:     Vital Signs (Most Recent):  Temp: 99.6 °F (37.6 °C) (22)  Pulse: 90 (22)  Resp: (!) 26 (22)  BP: 129/81 (22)  SpO2: 97 % (22) Vital Signs (24h Range):  Temp:  [99.6 °F (37.6 °C)] 99.6 °F (37.6 °C)  Pulse:  [86-90] 90  Resp:  [18-26] 26  SpO2:  [93 %-97 %] 97 %  BP: (129)/(81) 129/81     Weight: 81.6 kg (180 lb)  Body mass  index is 26.58 kg/m².    Physical Exam  Constitutional:       General: He is not in acute distress.     Appearance: He is well-developed and well-nourished.   HENT:      Head: Normocephalic and atraumatic.   Eyes:      Extraocular Movements: EOM normal.      Conjunctiva/sclera: Conjunctivae normal.      Pupils: Pupils are equal, round, and reactive to light.   Cardiovascular:      Rate and Rhythm: Regular rhythm. Tachycardia present.      Pulses: Intact distal pulses.      Heart sounds: Normal heart sounds.   Pulmonary:      Effort: Pulmonary effort is normal. Tachypnea present.      Breath sounds: Decreased breath sounds present.   Abdominal:      General: Bowel sounds are normal. There is no distension.      Palpations: Abdomen is soft.      Tenderness: There is no abdominal tenderness.   Musculoskeletal:         General: No edema. Normal range of motion.      Cervical back: Normal range of motion and neck supple.   Skin:     General: Skin is warm and dry.   Neurological:      Mental Status: He is alert and oriented to person, place, and time.   Psychiatric:         Mood and Affect: Mood and affect normal.         Behavior: Behavior normal.           CRANIAL NERVES     CN III, IV, VI   Pupils are equal, round, and reactive to light.  Extraocular motions are normal.        Significant Labs:   All pertinent labs within the past 24 hours have been reviewed.  CBC:   Recent Labs   Lab 01/05/22  0048   WBC 3.63*   HGB 15.1   HCT 44.7        CMP:   Recent Labs   Lab 01/05/22  0048   *   K 3.6      CO2 22*   *   BUN 18   CREATININE 1.0   CALCIUM 8.8   PROT 7.2   ALBUMIN 3.4*   BILITOT 0.5   ALKPHOS 118   AST 74*   ALT 66*   ANIONGAP 12   EGFRNONAA >60       Significant Imaging: I have reviewed all pertinent imaging results/findings within the past 24 hours.    Assessment/Plan:     * Pneumonia due to COVID-19 virus  - COVID-19 testing   - Infection Control notified     - Isolation:   - Airborne,  Contact and Droplet Precautions  - Cohort patients into COVID units  - N95 mask, wear eye protection  - 20 second hand hygiene              - Limit visitors per hospital policy              - Consolidating lab draws, nursing care, provider visits, and interventions    - Diagnostics: (leukopenia, hyponatremia, hyperferritinemia, elevated troponin, elevated d-dimer, age, and comorbidities are significant predictors of poor clinical outcome)  CBC, CMP, Procalcitonin, Ferritin, CRP, LDH, BNP, Troponin, ECG, Blood Culture x2, Portable CXR and UA and culture    - Management:  Supplemental O2 to maintain SpO2 >92%  Telemetry  Continuous/intermittent Pulse Ox  Albuterol treatment PRN  Careful use of steroids in the absence of other indications - unless septic shock due to increased viral replication Fluid sparing resuscitation    Advance Care Planning   code status:  FULL code               Seizures  Chronic condition.  Maintain seizure/fall/aspiration precautions.  Continue chronic keppra.      Acute respiratory failure with hypoxia  Patient with Hypoxic Respiratory failure which is Acute.  he is not on home oxygen. Supplemental oxygen was provided and noted-  .   Signs/symptoms of respiratory failure include- tachypnea. Contributing diagnoses includes - COVID 19 Labs and images were reviewed. Patient Has not had a recent ABG. Will treat underlying causes and adjust management of respiratory failure as follows- continue supplemental oxygen as needed.    Hyperlipidemia   Patient is chronically on statin.will continue for now. Monitor clinically. Last LDL was No results found for: LDLCALC           VTE Risk Mitigation (From admission, onward)    SCDs             BENNIE Cheng  Department of Hospital Medicine   [facility] - Emergency Dept

## 2022-01-05 NOTE — ASSESSMENT & PLAN NOTE
- COVID-19 testing   - Infection Control notified     - Isolation:   - Airborne, Contact and Droplet Precautions  - Cohort patients into COVID units  - N95 mask, wear eye protection  - 20 second hand hygiene              - Limit visitors per hospital policy              - Consolidating lab draws, nursing care, provider visits, and interventions    - Diagnostics: (leukopenia, hyponatremia, hyperferritinemia, elevated troponin, elevated d-dimer, age, and comorbidities are significant predictors of poor clinical outcome)  CBC, CMP, Procalcitonin, Ferritin, CRP, LDH, BNP, Troponin, ECG, Blood Culture x2, Portable CXR and UA and culture    - Management:  Supplemental O2 to maintain SpO2 >92%  Telemetry  Continuous/intermittent Pulse Ox  Albuterol treatment PRN  Careful use of steroids in the absence of other indications - unless septic shock due to increased viral replication Fluid sparing resuscitation    Advance Care Planning   code status:  FULL code

## 2022-01-05 NOTE — PLAN OF CARE
Plan of care continues; VSS on 2-3L O2 nasal cannula, afebrile. Patient independent in ADLs; alert and oriented x4. Tele [id number] in place, normal sinus rhythm. Isolation precautions maintained. Safety maintained, call light in reach.

## 2022-01-05 NOTE — PLAN OF CARE
01/05/22 0727   Patient Assessment/Suction   Level of Consciousness (AVPU) alert   Respiratory Effort Normal;Unlabored   Expansion/Accessory Muscles/Retractions no use of accessory muscles;no retractions   All Lung Fields Breath Sounds diminished;clear   Rhythm/Pattern, Respiratory unlabored;pattern regular;depth regular;no shortness of breath reported   PRE-TX-O2   O2 Device (Oxygen Therapy) nasal cannula   $ Is the patient on Low Flow Oxygen? Yes   Flow (L/min) 2   SpO2 (!) 94 %   Pulse Oximetry Type Continuous   $ Pulse Oximetry - Multiple Charge Pulse Oximetry - Multiple   Pulse 80   Resp 20   Inhaler   $ Inhaler Charges MDI (Metered Dose Inahler) Treatment;Given With Spacer   Daily Review of Necessity (Inhaler) completed   Respiratory Treatment Status (Inhaler) given   Treatment Route (Inhaler) spacer/holding chamber   Patient Position (Inhaler) HOB elevated   Post Treatment Assessment (Inhaler) breath sounds unchanged;vital signs unchanged   Signs of Intolerance (Inhaler) none   Breath Sounds Post-Respiratory Treatment   Throughout All Fields Post-Treatment All Fields   Throughout All Fields Post-Treatment no change   Post-treatment Heart Rate (beats/min) 80   Post-treatment Resp Rate (breaths/min) 18

## 2022-01-05 NOTE — ED NOTES
Patient to ED via POV c/o increased SOB since COVID diagnosis 12/28/21. Patient has no other complaints. Pt tachypneic with O2 sat of 93% on RA. Patient reports sat of 87-89% at home. Pt AAOx4, VSS, NADN.

## 2022-01-05 NOTE — ED PROVIDER NOTES
Encounter Date: 2022       History     Chief Complaint   Patient presents with    Hypoxia     Wife reports oxygen saturations at home were 88%. Patient reports he was seen at Urgent Care today and had an xray. Tested positive last Tuesday     Chief complaint:  Hypoxia    HPI:  56-year-old male diagnosed with COVID 1 week ago was found to have hypoxia with oxygen saturations of 88% today.  He is a former smoker with denies a history of lung disease.  Past medical history is significant for hyperlipidemia a claustrophobia.  He denies any nausea, vomiting and does not report shortness of breath.        Review of patient's allergies indicates:   Allergen Reactions    Codeine Anaphylaxis     Past Medical History:   Diagnosis Date    Anxiety     flying    Claustrophobia     Dizziness     Hyperlipidemia      Past Surgical History:   Procedure Laterality Date    APPENDECTOMY      COLONOSCOPY  2013    Dr Cheo lucero 5 years    VASECTOMY       Family History   Problem Relation Age of Onset    Cancer Mother         ovarian    Early death Mother     No Known Problems Father     No Known Problems Sister     No Known Problems Brother     No Known Problems Daughter     No Known Problems Son     No Known Problems Maternal Aunt     No Known Problems Maternal Uncle     No Known Problems Maternal Grandmother     Stroke Paternal Grandfather      Social History     Tobacco Use    Smoking status: Former Smoker     Packs/day: 0.25     Years: 19.00     Pack years: 4.75     Types: Cigarettes     Quit date: 2013     Years since quittin.6    Smokeless tobacco: Never Used    Tobacco comment: Quitting currently    Substance Use Topics    Alcohol use: No     Alcohol/week: 0.0 standard drinks    Drug use: No     Review of Systems   Constitutional: Negative for activity change, appetite change, chills, fatigue and fever.   Eyes: Negative for visual disturbance.   Respiratory: Positive for cough.  Negative for apnea and shortness of breath.    Cardiovascular: Negative for chest pain and palpitations.   Gastrointestinal: Negative for abdominal distention and abdominal pain.   Genitourinary: Negative for difficulty urinating.   Musculoskeletal: Negative for neck pain.   Skin: Negative for pallor and rash.   Neurological: Negative for headaches.   Hematological: Does not bruise/bleed easily.   Psychiatric/Behavioral: Negative for agitation.       Physical Exam     Initial Vitals [01/04/22 2346]   BP Pulse Resp Temp SpO2   129/81 86 18 99.6 °F (37.6 °C) (S) (!) 93 %      MAP       --         Physical Exam    Nursing note and vitals reviewed.  Constitutional: He appears well-developed and well-nourished.   HENT:   Head: Normocephalic and atraumatic.   Eyes: Conjunctivae are normal.   Neck: Neck supple.   Normal range of motion.  Cardiovascular: Normal rate, regular rhythm and normal heart sounds. Exam reveals no gallop and no friction rub.    No murmur heard.  Pulmonary/Chest: No respiratory distress. He has no wheezes. He has rhonchi. He has no rales.   Abdominal: Abdomen is soft. He exhibits no distension. There is no abdominal tenderness.   Musculoskeletal:         General: Normal range of motion.      Cervical back: Normal range of motion and neck supple.     Neurological: He is alert and oriented to person, place, and time.   Skin: Skin is warm and dry.   Psychiatric: He has a normal mood and affect.         ED Course   Procedures  Labs Reviewed   CBC W/ AUTO DIFFERENTIAL   COMPREHENSIVE METABOLIC PANEL          Imaging Results          X-Ray Chest AP Portable (In process)                  Medications   levETIRAcetam tablet 500 mg (has no administration in time range)   albuterol inhaler 4 puff (4 puffs Inhalation Given 1/5/22 0056)   dexamethasone injection 8 mg (8 mg Intravenous Given 1/5/22 0053)     Medical Decision Making:   ED Management:  56-year-old former smoker diagnosed with COVID 1 week ago is  found to have hypoxia.  Chest x-ray demonstrates patchy bilateral infiltrates.  He will be admitted for supplemental oxygen, steroids and antivirals.                      Clinical Impression:   Final diagnoses:  [U07.1] COVID-19          ED Disposition Condition    Observation               Seferino Choi III, MD  01/05/22 0115

## 2022-01-05 NOTE — ASSESSMENT & PLAN NOTE
Patient with Hypoxic Respiratory failure which is Acute.  he is not on home oxygen. Supplemental oxygen was provided and noted-  .   Signs/symptoms of respiratory failure include- tachypnea. Contributing diagnoses includes - COVID 19 Labs and images were reviewed. Patient Has not had a recent ABG. Will treat underlying causes and adjust management of respiratory failure as follows- continue supplemental oxygen as needed.

## 2022-01-05 NOTE — HPI
Stefano Donahue Jr. Is a 57 y/o male with a past medical history significant for HLD and seizures who presented to the ED for further evaluation of hypoxia.  He was diagnosed with COVID-19 one week ago.  His wife purchased a pulse ox in order to check the patient's oxygen saturation and today was noted to have a sat of 88%.  He was evaluated in the urgent care and thought to have pneumonia so he was sent to the ED.  During my interview, the patient's oxygen saturation was noted at 88-89% on room air and he was placed on supplemental oxygen.  Denies feeling SOB or chest pain, no nausea or vomiting.  He is placed in observation under the service of hospital medicine for continued medical management.

## 2022-01-05 NOTE — FIRST PROVIDER EVALUATION
Emergency Department TeleTriage Encounter Note      CHIEF COMPLAINT    Chief Complaint   Patient presents with    Hypoxia     Wife reports oxygen saturations at home were 88%. Patient reports he was seen at Urgent Care today and had an xray. Tested positive last Tuesday       VITAL SIGNS   Initial Vitals [01/04/22 2346]   BP Pulse Resp Temp SpO2   129/81 86 18 99.6 °F (37.6 °C) (S) (!) 93 %      MAP       --            ALLERGIES    Review of patient's allergies indicates:   Allergen Reactions    Codeine Anaphylaxis       PROVIDER TRIAGE NOTE  This is a well-appearing 56-year-old male who presents to the ER for hypoxia.  The patient was diagnosed with COVID-19 last week.  Pulse ox at home red 88% so he went to urgent care.  Urgent care confirmed readings.  Chest x-ray showed COVID pneumonia prompting the visit to the ED.  The patient appears well, nontoxic and nondistressed.  He does not appear to be tachypneic or tachycardic.      ORDERS  Labs Reviewed - No data to display    ED Orders (720h ago, onward)    None            Virtual Visit Note: The provider triage portion of this emergency department evaluation and documentation was performed via [email or web address], a HIPAA-compliant telemedicine application, in concert with a tele-presenter in the room. A face to face patient evaluation with one of my colleagues will occur once the patient is placed in an emergency department room.      DISCLAIMER: This note was prepared with [employer]ating Touch*howsimple voice recognition transcription software. Garbled syntax, mangled pronouns, and other bizarre constructions may be attributed to that software system.

## 2022-01-06 LAB
APTT BLDCRRT: 36.4 SEC (ref 21–32)
BASOPHILS # BLD AUTO: 0.01 K/UL (ref 0–0.2)
BASOPHILS NFR BLD: 0.1 % (ref 0–1.9)
DIFFERENTIAL METHOD: ABNORMAL
EOSINOPHIL # BLD AUTO: 0 K/UL (ref 0–0.5)
EOSINOPHIL NFR BLD: 0 % (ref 0–8)
ERYTHROCYTE [DISTWIDTH] IN BLOOD BY AUTOMATED COUNT: 12 % (ref 11.5–14.5)
HCT VFR BLD AUTO: 43.1 % (ref 40–54)
HGB BLD-MCNC: 14.6 G/DL (ref 14–18)
IMM GRANULOCYTES # BLD AUTO: 0.04 K/UL (ref 0–0.04)
IMM GRANULOCYTES NFR BLD AUTO: 0.5 % (ref 0–0.5)
INR PPP: 1 (ref 0.8–1.2)
LYMPHOCYTES # BLD AUTO: 1.4 K/UL (ref 1–4.8)
LYMPHOCYTES NFR BLD: 15.9 % (ref 18–48)
MCH RBC QN AUTO: 29.4 PG (ref 27–31)
MCHC RBC AUTO-ENTMCNC: 33.9 G/DL (ref 32–36)
MCV RBC AUTO: 87 FL (ref 82–98)
MONOCYTES # BLD AUTO: 0.7 K/UL (ref 0.3–1)
MONOCYTES NFR BLD: 7.6 % (ref 4–15)
NEUTROPHILS # BLD AUTO: 6.6 K/UL (ref 1.8–7.7)
NEUTROPHILS NFR BLD: 75.9 % (ref 38–73)
NRBC BLD-RTO: 0 /100 WBC
PLATELET # BLD AUTO: 247 K/UL (ref 150–450)
PLATELET BLD QL SMEAR: ABNORMAL
PMV BLD AUTO: 9.5 FL (ref 9.2–12.9)
PROTHROMBIN TIME: 10.3 SEC (ref 9–12.5)
RBC # BLD AUTO: 4.97 M/UL (ref 4.6–6.2)
WBC # BLD AUTO: 8.74 K/UL (ref 3.9–12.7)

## 2022-01-06 PROCEDURE — 85025 COMPLETE CBC W/AUTO DIFF WBC: CPT | Performed by: NURSE PRACTITIONER

## 2022-01-06 PROCEDURE — 27000207 HC ISOLATION

## 2022-01-06 PROCEDURE — 27000221 HC OXYGEN, UP TO 24 HOURS

## 2022-01-06 PROCEDURE — 63600175 PHARM REV CODE 636 W HCPCS: Performed by: NURSE PRACTITIONER

## 2022-01-06 PROCEDURE — C9399 UNCLASSIFIED DRUGS OR BIOLOG: HCPCS | Performed by: NURSE PRACTITIONER

## 2022-01-06 PROCEDURE — 94761 N-INVAS EAR/PLS OXIMETRY MLT: CPT

## 2022-01-06 PROCEDURE — 85730 THROMBOPLASTIN TIME PARTIAL: CPT | Performed by: NURSE PRACTITIONER

## 2022-01-06 PROCEDURE — 94640 AIRWAY INHALATION TREATMENT: CPT

## 2022-01-06 PROCEDURE — 85610 PROTHROMBIN TIME: CPT | Performed by: NURSE PRACTITIONER

## 2022-01-06 PROCEDURE — 12000002 HC ACUTE/MED SURGE SEMI-PRIVATE ROOM

## 2022-01-06 PROCEDURE — 25000003 PHARM REV CODE 250: Performed by: NURSE PRACTITIONER

## 2022-01-06 PROCEDURE — 25000003 PHARM REV CODE 250: Performed by: HOSPITALIST

## 2022-01-06 PROCEDURE — 25000242 PHARM REV CODE 250 ALT 637 W/ HCPCS: Performed by: NURSE PRACTITIONER

## 2022-01-06 RX ORDER — GUAIFENESIN 600 MG/1
600 TABLET, EXTENDED RELEASE ORAL 2 TIMES DAILY
Status: DISCONTINUED | OUTPATIENT
Start: 2022-01-06 | End: 2022-01-17 | Stop reason: HOSPADM

## 2022-01-06 RX ORDER — ACETAMINOPHEN 500 MG
1000 TABLET ORAL EVERY 6 HOURS PRN
Status: DISCONTINUED | OUTPATIENT
Start: 2022-01-06 | End: 2022-01-17 | Stop reason: HOSPADM

## 2022-01-06 RX ADMIN — ALBUTEROL SULFATE 2 PUFF: 90 AEROSOL, METERED RESPIRATORY (INHALATION) at 12:01

## 2022-01-06 RX ADMIN — ALBUTEROL SULFATE 2 PUFF: 90 AEROSOL, METERED RESPIRATORY (INHALATION) at 07:01

## 2022-01-06 RX ADMIN — ENOXAPARIN SODIUM 80 MG: 80 INJECTION SUBCUTANEOUS at 08:01

## 2022-01-06 RX ADMIN — LEVETIRACETAM 500 MG: 500 TABLET, FILM COATED ORAL at 08:01

## 2022-01-06 RX ADMIN — OXYCODONE HYDROCHLORIDE AND ACETAMINOPHEN 500 MG: 500 TABLET ORAL at 09:01

## 2022-01-06 RX ADMIN — GUAIFENESIN 600 MG: 600 TABLET, EXTENDED RELEASE ORAL at 08:01

## 2022-01-06 RX ADMIN — LEVETIRACETAM 500 MG: 500 TABLET, FILM COATED ORAL at 09:01

## 2022-01-06 RX ADMIN — ENOXAPARIN SODIUM 80 MG: 80 INJECTION SUBCUTANEOUS at 09:01

## 2022-01-06 RX ADMIN — ACETAMINOPHEN 650 MG: 325 TABLET ORAL at 12:01

## 2022-01-06 RX ADMIN — THERA TABS 1 TABLET: TAB at 09:01

## 2022-01-06 RX ADMIN — GUAIFENESIN 600 MG: 600 TABLET, EXTENDED RELEASE ORAL at 02:01

## 2022-01-06 RX ADMIN — DEXAMETHASONE 6 MG: 4 TABLET ORAL at 09:01

## 2022-01-06 RX ADMIN — ATORVASTATIN CALCIUM 20 MG: 20 TABLET, FILM COATED ORAL at 09:01

## 2022-01-06 RX ADMIN — OXYCODONE HYDROCHLORIDE AND ACETAMINOPHEN 500 MG: 500 TABLET ORAL at 08:01

## 2022-01-06 RX ADMIN — REMDESIVIR 100 MG: 100 INJECTION, POWDER, LYOPHILIZED, FOR SOLUTION INTRAVENOUS at 09:01

## 2022-01-06 NOTE — CARE UPDATE
01/05/22 1942   Patient Assessment/Suction   Level of Consciousness (AVPU) alert   Respiratory Effort Normal;Unlabored   Expansion/Accessory Muscles/Retractions no retractions;expansion symmetric;no use of accessory muscles   All Lung Fields Breath Sounds diminished   PRE-TX-O2   O2 Device (Oxygen Therapy) nasal cannula   Flow (L/min) 3   Oxygen Concentration (%) 32   SpO2 (!) 92 %   Pulse Oximetry Type Intermittent   Pulse 90   Resp 20   Inhaler   $ Inhaler Charges MDI (Metered Dose Inahler) Treatment   Respiratory Treatment Status (Inhaler) given   Treatment Route (Inhaler) spacer/holding chamber   Patient Position (Inhaler) HOB elevated   Signs of Intolerance (Inhaler) none   Breath Sounds Post-Respiratory Treatment   Throughout All Fields Post-Treatment All Fields   Throughout All Fields Post-Treatment no change   Post-treatment Heart Rate (beats/min) 88   Post-treatment Resp Rate (breaths/min) 18   Ready to Wean/Extubation Screen   FIO2<=50 (chart decimal) 0.32

## 2022-01-06 NOTE — SUBJECTIVE & OBJECTIVE
Interval History: [facility] and [facility] Patient reports headache not relieved by current tylenol dose (takes higher dose at home) and cough. New orders placed for symptom control. Day 2 of remdesivir today. On PO dexamethsone. 3L NC. Discussed plan to recheck inflammatory markers in AM.    Review of Systems   Constitutional: Negative for chills and fever.   HENT: Negative for congestion and sore throat.    Respiratory: Positive for cough. Negative for shortness of breath.    Cardiovascular: Negative for chest pain and palpitations.   Gastrointestinal: Negative for abdominal pain, diarrhea, nausea and vomiting.   Genitourinary: Negative for flank pain and hematuria.   Musculoskeletal: Negative for neck pain and neck stiffness.   Skin: Negative for pallor and rash.   Neurological: Positive for headaches. Negative for weakness.   Psychiatric/Behavioral: Negative for agitation and confusion.   All other systems reviewed and are negative.    Objective:     Vital Signs (Most Recent):  Temp: 98.4 °F (36.9 °C) (01/06/22 0915)  Pulse: 85 (01/06/22 0915)  Resp: 20 (01/06/22 0915)  BP: 134/80 (01/06/22 0915)  SpO2: (!) 92 % (01/06/22 0915) Vital Signs (24h Range):  Temp:  [97 °F (36.1 °C)-98.9 °F (37.2 °C)] 98.4 °F (36.9 °C)  Pulse:  [81-90] 85  Resp:  [16-20] 20  SpO2:  [91 %-94 %] 92 %  BP: (127-141)/(69-82) 134/80     Weight: 81.6 kg (180 lb)  Body mass index is 26.58 kg/m².  No intake or output data in the 24 hours ending 01/06/22 1207   Physical Exam  Constitutional:       General: He is not in acute distress.     Appearance: He is well-developed.   HENT:      Head: Normocephalic and atraumatic.   Eyes:      Conjunctiva/sclera: Conjunctivae normal.      Pupils: Pupils are equal, round, and reactive to light.   Cardiovascular:      Rate and Rhythm: Normal rate and regular rhythm.      Heart sounds: Normal heart sounds.   Pulmonary:      Effort: No tachypnea.      Breath sounds: Decreased breath sounds  and rales (bibasilar, L>R) present.   Abdominal:      General: Bowel sounds are normal. There is no distension.      Palpations: Abdomen is soft.      Tenderness: There is no abdominal tenderness.   Musculoskeletal:         General: Normal range of motion.      Cervical back: Normal range of motion and neck supple.   Skin:     General: Skin is warm and dry.   Neurological:      Mental Status: He is alert and oriented to person, place, and time.   Psychiatric:         Behavior: Behavior normal.         Significant Labs: All pertinent labs within the past 24 hours have been reviewed.    Significant Imaging: I have reviewed all pertinent imaging results/findings within the past 24 hours.

## 2022-01-06 NOTE — ASSESSMENT & PLAN NOTE
- COVID-19 testing   - Infection Control notified     - Isolation:   - Airborne, Contact and Droplet Precautions  - Cohort patients into COVID units  - N95 mask, wear eye protection  - 20 second hand hygiene              - Limit visitors per hospital policy              - Consolidating lab draws, nursing care, provider visits, and interventions    - Diagnostics: (leukopenia, hyponatremia, hyperferritinemia, elevated troponin, elevated d-dimer, age, and comorbidities are significant predictors of poor clinical outcome)  CBC, CMP, Procalcitonin, Ferritin, CRP, LDH, BNP, Troponin, ECG, Blood Culture x2, Portable CXR and UA and culture    - Management:  Supplemental O2 to maintain SpO2 >92%  Telemetry  Continuous/intermittent Pulse Ox  Albuterol treatment PRN  Remdesivir  Dexamethasone  FD lovenox per [facility] policy - patient counseled on risks and benefits and is agreeable.  Advance Care Planning   code status:  FULL code

## 2022-01-06 NOTE — PLAN OF CARE
Plan of care reviewed with pt at bedside. VSS, afebrile, O2 92-94% on 3L NC. Pt independent to ADL's and ambulatory. Tele [id number] continued, running NSR with. C/o headache, PRN med given per MAR. Isolation and safety precautions maintained.

## 2022-01-06 NOTE — PLAN OF CARE
01/06/22 0713   Patient Assessment/Suction   Level of Consciousness (AVPU) alert   Respiratory Effort Unlabored   Expansion/Accessory Muscles/Retractions no use of accessory muscles;no retractions   All Lung Fields Breath Sounds clear;diminished   Rhythm/Pattern, Respiratory unlabored;no shortness of breath reported;pattern regular;depth regular   Cough Frequency infrequent   Cough Type good;dry   PRE-TX-O2   O2 Device (Oxygen Therapy) nasal cannula   $ Is the patient on Low Flow Oxygen? Yes   Flow (L/min) 3   SpO2 (!) 94 %   Pulse Oximetry Type Continuous   $ Pulse Oximetry - Multiple Charge Pulse Oximetry - Multiple   Pulse 81   Resp 16   Inhaler   $ Inhaler Charges MDI (Metered Dose Inahler) Treatment;Given With Spacer   Daily Review of Necessity (Inhaler) completed   Respiratory Treatment Status (Inhaler) given   Treatment Route (Inhaler) spacer/holding chamber   Patient Position (Inhaler) HOB elevated   Post Treatment Assessment (Inhaler) breath sounds unchanged   Signs of Intolerance (Inhaler) none   Breath Sounds Post-Respiratory Treatment   Throughout All Fields Post-Treatment All Fields   Throughout All Fields Post-Treatment no change   Post-treatment Heart Rate (beats/min) 80   Post-treatment Resp Rate (breaths/min) 16

## 2022-01-06 NOTE — HOSPITAL COURSE
56-year-old male with history of hyperlipidemia and seizures admitted to the hospital medicine service with hypoxic respiratory failure secondary to COVID He was placed on supplemental oxygen, p.o. dexamethasone, Remdesivir and full-dose Lovenox.  Progressively patient required higher quantities of supplemental oxygen.  Patient received intravenous tocilizumab.  Pulmonary service was consulted.  Later on patient required transfer to intensive care unit for closer monitoring and use of noninvasive ventilatory therapy.  In intensive care unit, patient is maintained on Vapotherm oxygen which is slowly being weaned.  Patient is transferred back to medicine telemetry floor for further oxygen weaning. Improved over the course of hosp stay. Pulmonology was following. Improved and was on 3 L nasal canula oxygen. Had home o2 evaluation and medically stable for discharge with home oxygen.

## 2022-01-06 NOTE — PROGRESS NOTES
[facility] Hospital Medicine  Progress Note    Patient Name: Stefano Donahue Jr.  MRN: [id number]  Patient Class: IP- Inpatient   Admission Date: 1/5/2022  Length of Stay: 1 days  Attending Physician: Lawanda Harper MD  Primary Care Provider: Dario Ayon MD        Subjective:     Principal Problem:Pneumonia due to COVID-19 virus        HPI:  Stefano Donahue Jr. Is a 55 y/o male with a past medical history significant for HLD and seizures who presented to the ED for further evaluation of hypoxia.  He was diagnosed with COVID-19 one week ago.  His wife purchased a pulse ox in order to check the patient's oxygen saturation and today was noted to have a sat of 88%.  He was evaluated in the urgent care and thought to have pneumonia so he was sent to the ED.  During my interview, the patient's oxygen saturation was noted at 88-89% on room air and he was placed on supplemental oxygen.  Denies feeling SOB or chest pain, no nausea or vomiting.  He is placed in observation under the service of hospital medicine for continued medical management.         Overview/Hospital Course:  No notes on file    Interval History: Patient seen and examined. Wife at [facility]. Patient reports headache not relieved by current tylenol dose (takes higher dose at home) and cough. New orders placed for symptom control. Day 2 of remdesivir today. On PO dexamethsone. 3L NC. Discussed plan to recheck inflammatory markers in AM.    Review of Systems   Constitutional: Negative for chills and fever.   HENT: Negative for congestion and sore throat.    Respiratory: Positive for cough. Negative for shortness of breath.    Cardiovascular: Negative for chest pain and palpitations.   Gastrointestinal: Negative for abdominal pain, diarrhea, nausea and vomiting.   Genitourinary: Negative for flank pain and hematuria.   Musculoskeletal: Negative for neck pain and neck stiffness.   Skin: Negative for pallor and rash.   Neurological:  Positive for headaches. Negative for weakness.   Psychiatric/Behavioral: Negative for agitation and confusion.   All other systems reviewed and are negative.    Objective:     Vital Signs (Most Recent):  Temp: 98.4 °F (36.9 °C) (01/06/22 0915)  Pulse: 85 (01/06/22 0915)  Resp: 20 (01/06/22 0915)  BP: 134/80 (01/06/22 0915)  SpO2: (!) 92 % (01/06/22 0915) Vital Signs (24h Range):  Temp:  [97 °F (36.1 °C)-98.9 °F (37.2 °C)] 98.4 °F (36.9 °C)  Pulse:  [81-90] 85  Resp:  [16-20] 20  SpO2:  [91 %-94 %] 92 %  BP: (127-141)/(69-82) 134/80     Weight: 81.6 kg (180 lb)  Body mass index is 26.58 kg/m².  No intake or output data in the 24 hours ending 01/06/22 1207   Physical Exam  Constitutional:       General: He is not in acute distress.     Appearance: He is well-developed.   HENT:      Head: Normocephalic and atraumatic.   Eyes:      Conjunctiva/sclera: Conjunctivae normal.      Pupils: Pupils are equal, round, and reactive to light.   Cardiovascular:      Rate and Rhythm: Normal rate and regular rhythm.      Heart sounds: Normal heart sounds.   Pulmonary:      Effort: No tachypnea.      Breath sounds: Decreased breath sounds and rales (bibasilar, L>R) present.   Abdominal:      General: Bowel sounds are normal. There is no distension.      Palpations: Abdomen is soft.      Tenderness: There is no abdominal tenderness.   Musculoskeletal:         General: Normal range of motion.      Cervical back: Normal range of motion and neck supple.   Skin:     General: Skin is warm and dry.   Neurological:      Mental Status: He is alert and oriented to person, place, and time.   Psychiatric:         Behavior: Behavior normal.         Significant Labs: All pertinent labs within the past 24 hours have been reviewed.    Significant Imaging: I have reviewed all pertinent imaging results/findings within the past 24 hours.      Assessment/Plan:      * Pneumonia due to COVID-19 virus  - COVID-19 testing   - Infection Control notified     -  Isolation:   - Airborne, Contact and Droplet Precautions  - Cohort patients into COVID units  - N95 mask, wear eye protection  - 20 second hand hygiene              - Limit visitors per hospital policy              - Consolidating lab draws, nursing care, provider visits, and interventions    - Diagnostics: (leukopenia, hyponatremia, hyperferritinemia, elevated troponin, elevated d-dimer, age, and comorbidities are significant predictors of poor clinical outcome)  CBC, CMP, Procalcitonin, Ferritin, CRP, LDH, BNP, Troponin, ECG, Blood Culture x2, Portable CXR and UA and culture    - Management:  Supplemental O2 to maintain SpO2 >92%  Telemetry  Continuous/intermittent Pulse Ox  Albuterol treatment PRN  Remdesivir  Dexamethasone  FD lovenox per [facility] policy - patient counseled on risks and benefits and is agreeable.  Advance Care Planning   code status:  FULL code               Seizures  Chronic condition.  Maintain seizure/fall/aspiration precautions.  Continue chronic keppra.      Acute respiratory failure with hypoxia  Patient with Hypoxic Respiratory failure which is Acute.  he is not on home oxygen. Supplemental oxygen was provided and noted-  .   Signs/symptoms of respiratory failure include- tachypnea. Contributing diagnoses includes - COVID 19 Labs and images were reviewed. Patient Has not had a recent ABG. Will treat underlying causes and adjust management of respiratory failure as follows- continue supplemental oxygen as needed.    Hyperlipidemia   Patient is chronically on statin.will continue for now. Monitor clinically. Last LDL was No results found for: LDLCALC           VTE Risk Mitigation (From admission, onward)         Ordered     enoxaparin injection 80 mg  2 times daily         01/05/22 0626                Discharge Planning   NEL: 1/7/2022     Code Status: Full Code   Is the patient medically ready for discharge?:     Reason for patient still in hospital (select all that apply): Patient trending  condition and Treatment  Discharge Plan A: Home                  Lawanda Harper MD  Department of Hospital Medicine   [facility]

## 2022-01-07 LAB
ALBUMIN SERPL BCP-MCNC: 3.1 G/DL (ref 3.5–5.2)
ALP SERPL-CCNC: 113 U/L (ref 55–135)
ALT SERPL W/O P-5'-P-CCNC: 54 U/L (ref 10–44)
ANION GAP SERPL CALC-SCNC: 11 MMOL/L (ref 8–16)
AST SERPL-CCNC: 50 U/L (ref 10–40)
BASOPHILS # BLD AUTO: 0 K/UL (ref 0–0.2)
BASOPHILS NFR BLD: 0 % (ref 0–1.9)
BILIRUB SERPL-MCNC: 0.5 MG/DL (ref 0.1–1)
BUN SERPL-MCNC: 22 MG/DL (ref 6–20)
CALCIUM SERPL-MCNC: 9.1 MG/DL (ref 8.7–10.5)
CHLORIDE SERPL-SCNC: 102 MMOL/L (ref 95–110)
CO2 SERPL-SCNC: 25 MMOL/L (ref 23–29)
CREAT SERPL-MCNC: 0.8 MG/DL (ref 0.5–1.4)
CRP SERPL-MCNC: 28.1 MG/L (ref 0–8.2)
D DIMER PPP IA.FEU-MCNC: 0.35 MG/L FEU
DIFFERENTIAL METHOD: ABNORMAL
EOSINOPHIL # BLD AUTO: 0 K/UL (ref 0–0.5)
EOSINOPHIL NFR BLD: 0 % (ref 0–8)
ERYTHROCYTE [DISTWIDTH] IN BLOOD BY AUTOMATED COUNT: 11.9 % (ref 11.5–14.5)
EST. GFR  (AFRICAN AMERICAN): >60 ML/MIN/1.73 M^2
EST. GFR  (NON AFRICAN AMERICAN): >60 ML/MIN/1.73 M^2
FERRITIN SERPL-MCNC: 1500 NG/ML (ref 20–300)
GLUCOSE SERPL-MCNC: 146 MG/DL (ref 70–110)
HCT VFR BLD AUTO: 42.1 % (ref 40–54)
HGB BLD-MCNC: 14.2 G/DL (ref 14–18)
IMM GRANULOCYTES # BLD AUTO: 0.03 K/UL (ref 0–0.04)
IMM GRANULOCYTES NFR BLD AUTO: 0.5 % (ref 0–0.5)
LYMPHOCYTES # BLD AUTO: 0.9 K/UL (ref 1–4.8)
LYMPHOCYTES NFR BLD: 14.5 % (ref 18–48)
MCH RBC QN AUTO: 29.4 PG (ref 27–31)
MCHC RBC AUTO-ENTMCNC: 33.7 G/DL (ref 32–36)
MCV RBC AUTO: 87 FL (ref 82–98)
MONOCYTES # BLD AUTO: 0.8 K/UL (ref 0.3–1)
MONOCYTES NFR BLD: 11.5 % (ref 4–15)
NEUTROPHILS # BLD AUTO: 4.8 K/UL (ref 1.8–7.7)
NEUTROPHILS NFR BLD: 73.5 % (ref 38–73)
NRBC BLD-RTO: 0 /100 WBC
PLATELET # BLD AUTO: 286 K/UL (ref 150–450)
PMV BLD AUTO: 9.6 FL (ref 9.2–12.9)
POTASSIUM SERPL-SCNC: 4.3 MMOL/L (ref 3.5–5.1)
PROT SERPL-MCNC: 7 G/DL (ref 6–8.4)
RBC # BLD AUTO: 4.83 M/UL (ref 4.6–6.2)
SODIUM SERPL-SCNC: 138 MMOL/L (ref 136–145)
WBC # BLD AUTO: 6.5 K/UL (ref 3.9–12.7)

## 2022-01-07 PROCEDURE — 82728 ASSAY OF FERRITIN: CPT | Performed by: HOSPITALIST

## 2022-01-07 PROCEDURE — 25000003 PHARM REV CODE 250: Performed by: HOSPITALIST

## 2022-01-07 PROCEDURE — 85025 COMPLETE CBC W/AUTO DIFF WBC: CPT | Performed by: NURSE PRACTITIONER

## 2022-01-07 PROCEDURE — 86140 C-REACTIVE PROTEIN: CPT | Performed by: HOSPITALIST

## 2022-01-07 PROCEDURE — 27000221 HC OXYGEN, UP TO 24 HOURS

## 2022-01-07 PROCEDURE — 94640 AIRWAY INHALATION TREATMENT: CPT

## 2022-01-07 PROCEDURE — 80074 ACUTE HEPATITIS PANEL: CPT | Performed by: INTERNAL MEDICINE

## 2022-01-07 PROCEDURE — 99222 PR INITIAL HOSPITAL CARE,LEVL II: ICD-10-PCS | Mod: ,,, | Performed by: INTERNAL MEDICINE

## 2022-01-07 PROCEDURE — 36415 COLL VENOUS BLD VENIPUNCTURE: CPT | Performed by: HOSPITALIST

## 2022-01-07 PROCEDURE — 25000003 PHARM REV CODE 250: Performed by: NURSE PRACTITIONER

## 2022-01-07 PROCEDURE — 27000207 HC ISOLATION

## 2022-01-07 PROCEDURE — 94761 N-INVAS EAR/PLS OXIMETRY MLT: CPT

## 2022-01-07 PROCEDURE — C9399 UNCLASSIFIED DRUGS OR BIOLOG: HCPCS | Performed by: NURSE PRACTITIONER

## 2022-01-07 PROCEDURE — 25000242 PHARM REV CODE 250 ALT 637 W/ HCPCS: Performed by: NURSE PRACTITIONER

## 2022-01-07 PROCEDURE — 99900035 HC TECH TIME PER 15 MIN (STAT)

## 2022-01-07 PROCEDURE — 12000002 HC ACUTE/MED SURGE SEMI-PRIVATE ROOM

## 2022-01-07 PROCEDURE — 63600175 PHARM REV CODE 636 W HCPCS: Performed by: NURSE PRACTITIONER

## 2022-01-07 PROCEDURE — 36415 COLL VENOUS BLD VENIPUNCTURE: CPT | Performed by: INTERNAL MEDICINE

## 2022-01-07 PROCEDURE — 99222 1ST HOSP IP/OBS MODERATE 55: CPT | Mod: ,,, | Performed by: INTERNAL MEDICINE

## 2022-01-07 PROCEDURE — 85379 FIBRIN DEGRADATION QUANT: CPT | Performed by: HOSPITALIST

## 2022-01-07 PROCEDURE — 94799 UNLISTED PULMONARY SVC/PX: CPT

## 2022-01-07 PROCEDURE — 80053 COMPREHEN METABOLIC PANEL: CPT | Performed by: HOSPITALIST

## 2022-01-07 RX ADMIN — OXYCODONE HYDROCHLORIDE AND ACETAMINOPHEN 500 MG: 500 TABLET ORAL at 09:01

## 2022-01-07 RX ADMIN — OXYCODONE HYDROCHLORIDE AND ACETAMINOPHEN 500 MG: 500 TABLET ORAL at 08:01

## 2022-01-07 RX ADMIN — THERA TABS 1 TABLET: TAB at 08:01

## 2022-01-07 RX ADMIN — ALBUTEROL SULFATE 2 PUFF: 90 AEROSOL, METERED RESPIRATORY (INHALATION) at 08:01

## 2022-01-07 RX ADMIN — ENOXAPARIN SODIUM 80 MG: 80 INJECTION SUBCUTANEOUS at 09:01

## 2022-01-07 RX ADMIN — GUAIFENESIN 600 MG: 600 TABLET, EXTENDED RELEASE ORAL at 09:01

## 2022-01-07 RX ADMIN — ALBUTEROL SULFATE 2 PUFF: 90 AEROSOL, METERED RESPIRATORY (INHALATION) at 12:01

## 2022-01-07 RX ADMIN — ENOXAPARIN SODIUM 80 MG: 80 INJECTION SUBCUTANEOUS at 08:01

## 2022-01-07 RX ADMIN — ATORVASTATIN CALCIUM 20 MG: 20 TABLET, FILM COATED ORAL at 08:01

## 2022-01-07 RX ADMIN — LEVETIRACETAM 500 MG: 500 TABLET, FILM COATED ORAL at 08:01

## 2022-01-07 RX ADMIN — GUAIFENESIN 600 MG: 600 TABLET, EXTENDED RELEASE ORAL at 08:01

## 2022-01-07 RX ADMIN — ALBUTEROL SULFATE 2 PUFF: 90 AEROSOL, METERED RESPIRATORY (INHALATION) at 07:01

## 2022-01-07 RX ADMIN — REMDESIVIR 100 MG: 100 INJECTION, POWDER, LYOPHILIZED, FOR SOLUTION INTRAVENOUS at 08:01

## 2022-01-07 RX ADMIN — ALBUTEROL SULFATE 2 PUFF: 90 AEROSOL, METERED RESPIRATORY (INHALATION) at 01:01

## 2022-01-07 RX ADMIN — DEXAMETHASONE 6 MG: 4 TABLET ORAL at 08:01

## 2022-01-07 RX ADMIN — LEVETIRACETAM 500 MG: 500 TABLET, FILM COATED ORAL at 09:01

## 2022-01-07 NOTE — PROGRESS NOTES
[facility] Hospital Medicine  Progress Note    Patient Name: Stefano Donahue Jr.  MRN: [id number]  Patient Class: IP- Inpatient   Admission Date: 1/5/2022  Length of Stay: 2 days  Attending Physician: Estrella Lama MD  Primary Care Provider: Dario Ayon MD        Subjective:     Principal Problem:Pneumonia due to COVID-19 virus        HPI:  Stefano Donahue Jr. Is a 55 y/o male with a past medical history significant for HLD and seizures who presented to the ED for further evaluation of hypoxia.  He was diagnosed with COVID-19 one week ago.  His wife purchased a pulse ox in order to check the patient's oxygen saturation and today was noted to have a sat of 88%.  He was evaluated in the urgent care and thought to have pneumonia so he was sent to the ED.  During my interview, the patient's oxygen saturation was noted at 88-89% on room air and he was placed on supplemental oxygen.  Denies feeling SOB or chest pain, no nausea or vomiting.  He is placed in observation under the service of hospital medicine for continued medical management.         Overview/Hospital Course:  56-year-old male with history of hyperlipidemia and seizures admitted to the hospital medicine service with hypoxic respiratory failure secondary to COVID He was placed on supplemental oxygen, p.o. dexamethasone, Remdesivir and full-dose Lovenox.        Interval History: [facility]  Overnight patient is requiring much higher quantities of supplemental oxygen, now on 8 liter/minute via nasal cannula.  Day number 3 remdesivir today. On PO dexamethsone.     Review of Systems   Constitutional: Negative for chills and fever.   HENT: Negative for congestion and sore throat.    Respiratory: Positive for cough. Negative for shortness of breath.    Cardiovascular: Negative for chest pain and palpitations.   Gastrointestinal: Negative for abdominal pain, diarrhea, nausea and vomiting.   Genitourinary:  Negative for flank pain and hematuria.   Musculoskeletal: Negative for neck pain and neck stiffness.   Skin: Negative for pallor and rash.   Neurological: Positive for headaches. Negative for weakness.   Psychiatric/Behavioral: Negative for agitation and confusion.   All other systems reviewed and are negative.    Objective:     Vital Signs (Most Recent):  Temp: 97.3 °F (36.3 °C) (01/07/22 0859)  Pulse: 73 (01/07/22 0859)  Resp: 18 (01/07/22 0859)  BP: 119/75 (01/07/22 0859)  SpO2: (!) 91 % (01/07/22 0859) Vital Signs (24h Range):  Temp:  [97.3 °F (36.3 °C)-98.9 °F (37.2 °C)] 97.3 °F (36.3 °C)  Pulse:  [65-93] 73  Resp:  [18-20] 18  SpO2:  [86 %-98 %] 91 %  BP: (119-135)/(67-83) 119/75     Weight: 81.6 kg (180 lb)  Body mass index is 26.58 kg/m².    Intake/Output Summary (Last 24 hours) at 1/7/2022 1016  Last data filed at 1/6/2022 1200  Gross per 24 hour   Intake 120 ml   Output --   Net 120 ml      Physical Exam  Constitutional:       General: He is not in acute distress.     Appearance: He is well-developed.   HENT:      Head: Normocephalic and atraumatic.   Eyes:      Conjunctiva/sclera: Conjunctivae normal.      Pupils: Pupils are equal, round, and reactive to light.   Cardiovascular:      Rate and Rhythm: Normal rate and regular rhythm.      Heart sounds: Normal heart sounds.   Pulmonary:      Effort: No tachypnea.      Breath sounds: Decreased breath sounds and rales (bibasilar, L>R) present.   Abdominal:      General: Bowel sounds are normal. There is no distension.      Palpations: Abdomen is soft.      Tenderness: There is no abdominal tenderness.   Musculoskeletal:         General: Normal range of motion.      Cervical back: Normal range of motion and neck supple.   Skin:     General: Skin is warm and dry.   Neurological:      Mental Status: He is alert and oriented to person, place, and time.   Psychiatric:         Behavior: Behavior normal.         Significant Labs:   Lab Results   Component Value Date     WBC 6.50 01/07/2022    HGB 14.2 01/07/2022    HCT 42.1 01/07/2022    MCV 87 01/07/2022     01/07/2022     CMP  Sodium   Date Value Ref Range Status   01/07/2022 138 136 - 145 mmol/L Final     Potassium   Date Value Ref Range Status   01/07/2022 4.3 3.5 - 5.1 mmol/L Final     Chloride   Date Value Ref Range Status   01/07/2022 102 95 - 110 mmol/L Final     CO2   Date Value Ref Range Status   01/07/2022 25 23 - 29 mmol/L Final     Glucose   Date Value Ref Range Status   01/07/2022 146 (H) 70 - 110 mg/dL Final     BUN   Date Value Ref Range Status   01/07/2022 22 (H) 6 - 20 mg/dL Final     Creatinine   Date Value Ref Range Status   01/07/2022 0.8 0.5 - 1.4 mg/dL Final     Calcium   Date Value Ref Range Status   01/07/2022 9.1 8.7 - 10.5 mg/dL Final     Total Protein   Date Value Ref Range Status   01/07/2022 7.0 6.0 - 8.4 g/dL Final     Albumin   Date Value Ref Range Status   01/07/2022 3.1 (L) 3.5 - 5.2 g/dL Final     Total Bilirubin   Date Value Ref Range Status   01/07/2022 0.5 0.1 - 1.0 mg/dL Final     Comment:     For infants and newborns, interpretation of results should be based  on gestational age, weight and in agreement with clinical  observations.    Premature Infant recommended reference ranges:  Up to 24 hours.............<8.0 mg/dL  Up to 48 hours............<12.0 mg/dL  3-5 days..................<15.0 mg/dL  6-29 days.................<15.0 mg/dL       Alkaline Phosphatase   Date Value Ref Range Status   01/07/2022 113 55 - 135 U/L Final     AST   Date Value Ref Range Status   01/07/2022 50 (H) 10 - 40 U/L Final     ALT   Date Value Ref Range Status   01/07/2022 54 (H) 10 - 44 U/L Final     Anion Gap   Date Value Ref Range Status   01/07/2022 11 8 - 16 mmol/L Final     eGFR if    Date Value Ref Range Status   01/07/2022 >60 >60 mL/min/1.73 m^2 Final     eGFR if non    Date Value Ref Range Status   01/07/2022 >60 >60 mL/min/1.73 m^2 Final     Comment:      Calculation used to obtain the estimated glomerular filtration  rate (eGFR) is the CKD-EPI equation.        Microbiology Results (last 7 days)     ** No results found for the last 168 hours. **        Significant Imaging:  CXR:   Patchy interstitial infiltrates in the mid and lower lung fields likely due to a viral pneumonitis.    CXR: Pending.       Assessment/Plan:      * Pneumonia due to COVID-19 virus  - COVID-19 testing   - Infection Control notified     - Isolation:   - Airborne, Contact and Droplet Precautions  - Cohort patients into COVID units  - N95 mask, wear eye protection  - 20 second hand hygiene              - Limit visitors per hospital policy              - Consolidating lab draws, nursing care, provider visits, and interventions    - Diagnostics: (leukopenia, hyponatremia, hyperferritinemia, elevated troponin, elevated d-dimer, age, and comorbidities are significant predictors of poor clinical outcome)  CBC, CMP, Procalcitonin, Ferritin, CRP, LDH, BNP, Troponin, ECG, Blood Culture x2, Portable CXR and UA and culture    - Management:  Supplemental O2 to maintain SpO2 >92%  Telemetry  Continuous/intermittent Pulse Ox  Albuterol treatment PRN  Remdesivir  Dexamethasone  FD lovenox per [facility] policy - patient counseled on risks and benefits and is agreeable.  Consulting pulmonology specialist for opinion.  Decision regarding use of Baricitinib as per pulmonary team.  Advance Care Planning   code status:  FULL code               Seizures  Chronic condition.  Maintain seizure/fall/aspiration precautions.  Continue chronic keppra.      Acute respiratory failure with hypoxia  Patient with Hypoxic Respiratory failure which is Acute.  he is not on home oxygen. Supplemental oxygen was provided and noted-  .   Signs/symptoms of respiratory failure include- tachypnea. Contributing diagnoses includes - COVID 19 Labs and images were reviewed. Patient Has not had a recent ABG. Will treat underlying causes and  adjust management of respiratory failure as follows- continue supplemental oxygen as needed.    Hyperlipidemia   Patient is chronically on statin.will continue for now. Monitor clinically. Last LDL was No results found for: LDLCALC       Discussed with patient's wife, answered all questions.    VTE Risk Mitigation (From admission, onward)         Ordered     enoxaparin injection 80 mg  2 times daily         01/05/22 0626                Discharge Planning   NEL: 1/10/2022     Code Status: Full Code   Is the patient medically ready for discharge?:     Reason for patient still in hospital (select all that apply): Patient trending condition and Consult recommendations  Discharge Plan A: Home                  Estrella Lama MD  Department of Hospital Medicine   [facility]

## 2022-01-07 NOTE — CARE UPDATE
01/07/22 0713   Patient Assessment/Suction   Level of Consciousness (AVPU) alert   Respiratory Effort Normal;Unlabored   Expansion/Accessory Muscles/Retractions expansion symmetric;no use of accessory muscles;no retractions   All Lung Fields Breath Sounds diminished   Rhythm/Pattern, Respiratory no shortness of breath reported   Cough Frequency frequent   Cough Type dry;good;nonproductive   PRE-TX-O2   O2 Device (Oxygen Therapy) High Flow nasal Cannula   $ Is the patient on Low Flow Oxygen? Yes   Flow (L/min) 8   SpO2 (!) 93 %   Pulse Oximetry Type Continuous   $ Pulse Oximetry - Multiple Charge Pulse Oximetry - Multiple   Pulse 75   Resp 18   Positioning HOB elevated 30 degrees   Inhaler   $ Inhaler Charges MDI (Metered Dose Inahler) Treatment;Given With Spacer   Daily Review of Necessity (Inhaler) completed   Respiratory Treatment Status (Inhaler) given   Treatment Route (Inhaler) spacer/holding chamber   Patient Position (Inhaler) HOB elevated   Post Treatment Assessment (Inhaler) breath sounds unchanged   Signs of Intolerance (Inhaler) none   Breath Sounds Post-Respiratory Treatment   Throughout All Fields Post-Treatment All Fields   Throughout All Fields Post-Treatment no change   Post-treatment Heart Rate (beats/min) 71   Post-treatment Resp Rate (breaths/min) 18

## 2022-01-07 NOTE — CONSULTS
2022      Admit Date: 2022  Stefano Donahue Jr.  New Patient Consult    Chief Complaint   Patient presents with    Hypoxia     Wife reports oxygen saturations at home were 88%. Patient reports he was seen at Urgent Care today and had an xray. Tested positive last Tuesday       History of Present Illness:  Pt is a 55 yo male with COVID-19 pneumonia. He denies any pmh or hx lung disease. States he came into ED because sats were low but doesn't have any complaints other than some chest tightness when he takes a deep breath. Today his O2 requirement went up to 8L. Wife at bedside assists w/ history.       PFSH:  Past Medical History:   Diagnosis Date    Anxiety     flying    Claustrophobia     Dizziness     Hyperlipidemia      Past Surgical History:   Procedure Laterality Date    APPENDECTOMY      COLONOSCOPY  2013    Dr Cheo lucero 5 years    VASECTOMY       Social History     Tobacco Use    Smoking status: Former Smoker     Packs/day: 0.25     Years: 19.00     Pack years: 4.75     Types: Cigarettes     Quit date: 2013     Years since quittin.6    Smokeless tobacco: Never Used    Tobacco comment: Quitting currently    Substance Use Topics    Alcohol use: No     Alcohol/week: 0.0 standard drinks    Drug use: No     Family History   Problem Relation Age of Onset    Cancer Mother         ovarian    Early death Mother     No Known Problems Father     No Known Problems Sister     No Known Problems Brother     No Known Problems Daughter     No Known Problems Son     No Known Problems Maternal Aunt     No Known Problems Maternal Uncle     No Known Problems Maternal Grandmother     Stroke Paternal Grandfather      Review of patient's allergies indicates:   Allergen Reactions    Codeine Anaphylaxis       Performance Status:Performance Status:The patient's activity level is no limits with regular activity.    Review of Systems:  a review of eleven systems covering  "constitutional, Psych, Eye, HEENT, Respiratory, Cardiac, GI, , Musculoskeletal, Endocrine, Dermatologicwas negative except the above mentioned abnormalities and for any pertinent findings as listed below:  All negative with pertinent positives as above          Exam:Comprehensive exam done. /75 (Patient Position: Lying)   Pulse 78   Temp 97.3 °F (36.3 °C) (Oral)   Resp 18   Ht 5' 9" (1.753 m)   Wt 81.6 kg (180 lb)   SpO2 (!) 90% Comment: sats improved to 93%  BMI 26.58 kg/m²   Exam included Vitals as listed, and patient's appearance and affect and alertness and mood, oral exam for yeast and hygiene and pharynx lesions and Mallapatti (M) score, neck with inspection for jvd and masses and thyroid abnormalities and lymph nodes (supraclavicular and infraclavicular nodes also examined and noted if abn), chest exam included symmetry and effort and fremitus and percussion and auscultation, cardiac exam included rhythm and gallops and murmur and rubs and jvd and edema, abdominal exam for mass and hepatosplenomegaly and tenderness and hernias and bowel sounds, Musculoskeletal exam with muscle tone and posture and mobility/gait and  strenght, and skin for rashes and cyanosis and pallor and turgor, extremity for clubbing.  Findings were normal except as listed below:  Up in chair  No distress  Scattered rales bilaterally  No edema    Radiographs reviewed: view by direct vision   CXR 1/4/22- very mild pnuemonitis bilat bases      Labs     Recent Labs   Lab 01/07/22  0513   WBC 6.50   HGB 14.2   HCT 42.1        Recent Labs   Lab 01/07/22  0513 01/07/22  0826     --    K 4.3  --      --    CO2 25  --    BUN 22*  --    CREATININE 0.8  --    *  --    CALCIUM 9.1  --    AST 50*  --    ALT 54*  --    ALKPHOS 113  --    BILITOT 0.5  --    PROT 7.0  --    ALBUMIN 3.1*  --    CRP  --  28.1*   No results for input(s): PH, PCO2, PO2, HCO3 in the last 24 hours.  Microbiology Results (last 7 " days)     ** No results found for the last 168 hours. **          Impression:  Active Hospital Problems    Diagnosis  POA    *Pneumonia due to COVID-19 virus [U07.1, J12.82]  Yes    Acute respiratory failure with hypoxia [J96.01]  Yes    Seizures [R56.9]  Yes    Hyperlipidemia [E78.5]  Yes      Resolved Hospital Problems   No resolved problems to display.               Plan:     - continue supplemental oxygen to keep sats >92%  - prone as tolerated  - CXR repeat today- ordered  - continue inhaled bronchodilators  - incentive spirometry   - continue RDV  - continue decadron 6mg daily  - monitor inflammatory markers- downtrending  - continue empiric therapeutic lovenox    Soila Choi MD  Pulmonary & Critical Care Medicine

## 2022-01-07 NOTE — SUBJECTIVE & OBJECTIVE
Interval History: Patient seen and examined. Wife at [facility].  Overnight patient is requiring much higher quantities of supplemental oxygen, now on 8 liter/minute via nasal cannula.  Day number 3 remdesivir today. On PO dexamethsone.     Review of Systems   Constitutional: Negative for chills and fever.   HENT: Negative for congestion and sore throat.    Respiratory: Positive for cough. Negative for shortness of breath.    Cardiovascular: Negative for chest pain and palpitations.   Gastrointestinal: Negative for abdominal pain, diarrhea, nausea and vomiting.   Genitourinary: Negative for flank pain and hematuria.   Musculoskeletal: Negative for neck pain and neck stiffness.   Skin: Negative for pallor and rash.   Neurological: Positive for headaches. Negative for weakness.   Psychiatric/Behavioral: Negative for agitation and confusion.   All other systems reviewed and are negative.    Objective:     Vital Signs (Most Recent):  Temp: 97.3 °F (36.3 °C) (01/07/22 0859)  Pulse: 73 (01/07/22 0859)  Resp: 18 (01/07/22 0859)  BP: 119/75 (01/07/22 0859)  SpO2: (!) 91 % (01/07/22 0859) Vital Signs (24h Range):  Temp:  [97.3 °F (36.3 °C)-98.9 °F (37.2 °C)] 97.3 °F (36.3 °C)  Pulse:  [65-93] 73  Resp:  [18-20] 18  SpO2:  [86 %-98 %] 91 %  BP: (119-135)/(67-83) 119/75     Weight: 81.6 kg (180 lb)  Body mass index is 26.58 kg/m².    Intake/Output Summary (Last 24 hours) at 1/7/2022 1016  Last data filed at 1/6/2022 1200  Gross per 24 hour   Intake 120 ml   Output --   Net 120 ml      Physical Exam  Constitutional:       General: He is not in acute distress.     Appearance: He is well-developed.   HENT:      Head: Normocephalic and atraumatic.   Eyes:      Conjunctiva/sclera: Conjunctivae normal.      Pupils: Pupils are equal, round, and reactive to light.   Cardiovascular:      Rate and Rhythm: Normal rate and regular rhythm.      Heart sounds: Normal heart sounds.   Pulmonary:      Effort: No tachypnea.      Breath sounds:  Decreased breath sounds and rales (bibasilar, L>R) present.   Abdominal:      General: Bowel sounds are normal. There is no distension.      Palpations: Abdomen is soft.      Tenderness: There is no abdominal tenderness.   Musculoskeletal:         General: Normal range of motion.      Cervical back: Normal range of motion and neck supple.   Skin:     General: Skin is warm and dry.   Neurological:      Mental Status: He is alert and oriented to person, place, and time.   Psychiatric:         Behavior: Behavior normal.         Significant Labs:   Lab Results   Component Value Date    WBC 6.50 01/07/2022    HGB 14.2 01/07/2022    HCT 42.1 01/07/2022    MCV 87 01/07/2022     01/07/2022     CMP  Sodium   Date Value Ref Range Status   01/07/2022 138 136 - 145 mmol/L Final     Potassium   Date Value Ref Range Status   01/07/2022 4.3 3.5 - 5.1 mmol/L Final     Chloride   Date Value Ref Range Status   01/07/2022 102 95 - 110 mmol/L Final     CO2   Date Value Ref Range Status   01/07/2022 25 23 - 29 mmol/L Final     Glucose   Date Value Ref Range Status   01/07/2022 146 (H) 70 - 110 mg/dL Final     BUN   Date Value Ref Range Status   01/07/2022 22 (H) 6 - 20 mg/dL Final     Creatinine   Date Value Ref Range Status   01/07/2022 0.8 0.5 - 1.4 mg/dL Final     Calcium   Date Value Ref Range Status   01/07/2022 9.1 8.7 - 10.5 mg/dL Final     Total Protein   Date Value Ref Range Status   01/07/2022 7.0 6.0 - 8.4 g/dL Final     Albumin   Date Value Ref Range Status   01/07/2022 3.1 (L) 3.5 - 5.2 g/dL Final     Total Bilirubin   Date Value Ref Range Status   01/07/2022 0.5 0.1 - 1.0 mg/dL Final     Comment:     For infants and newborns, interpretation of results should be based  on gestational age, weight and in agreement with clinical  observations.    Premature Infant recommended reference ranges:  Up to 24 hours.............<8.0 mg/dL  Up to 48 hours............<12.0 mg/dL  3-5 days..................<15.0 mg/dL  6-29  days.................<15.0 mg/dL       Alkaline Phosphatase   Date Value Ref Range Status   01/07/2022 113 55 - 135 U/L Final     AST   Date Value Ref Range Status   01/07/2022 50 (H) 10 - 40 U/L Final     ALT   Date Value Ref Range Status   01/07/2022 54 (H) 10 - 44 U/L Final     Anion Gap   Date Value Ref Range Status   01/07/2022 11 8 - 16 mmol/L Final     eGFR if    Date Value Ref Range Status   01/07/2022 >60 >60 mL/min/1.73 m^2 Final     eGFR if non    Date Value Ref Range Status   01/07/2022 >60 >60 mL/min/1.73 m^2 Final     Comment:     Calculation used to obtain the estimated glomerular filtration  rate (eGFR) is the CKD-EPI equation.        Microbiology Results (last 7 days)     ** No results found for the last 168 hours. **        Significant Imaging:  CXR:   Patchy interstitial infiltrates in the mid and lower lung fields likely due to a viral pneumonitis.    CXR: Pending.

## 2022-01-07 NOTE — CARE UPDATE
01/06/22 1911 01/06/22 1914   Patient Assessment/Suction   Level of Consciousness (AVPU) alert  --    Respiratory Effort Short of breath  --    Expansion/Accessory Muscles/Retractions no use of accessory muscles  --    All Lung Fields Breath Sounds diminished  --    Rhythm/Pattern, Respiratory shortness of breath  --    Cough Frequency infrequent  --    Cough Type good;dry  --    PRE-TX-O2   O2 Device (Oxygen Therapy) nasal cannula High Flow nasal Cannula   $ Is the patient on Low Flow Oxygen? Yes Yes   Flow (L/min) 4 (S)  8   SpO2 (!) (S)  86 %  (placed pt on 8L HFNC sats 92%) (!) (S)  92 %   Pulse Oximetry Type Continuous  --    $ Pulse Oximetry - Multiple Charge Pulse Oximetry - Multiple  --    Pulse 93  --    Resp 20  --    Inhaler   $ Inhaler Charges MDI (Metered Dose Inahler) Treatment  --    Respiratory Treatment Status (Inhaler) given  --    Treatment Route (Inhaler) spacer/holding chamber;mouthpiece  --    Patient Position (Inhaler) HOB elevated  --    Post Treatment Assessment (Inhaler) breath sounds unchanged  --    Signs of Intolerance (Inhaler) none  --    Breath Sounds Post-Respiratory Treatment   Throughout All Fields Post-Treatment All Fields  --    Throughout All Fields Post-Treatment no change  --    Post-treatment Heart Rate (beats/min) 90  --    Post-treatment Resp Rate (breaths/min) 20  --

## 2022-01-07 NOTE — ASSESSMENT & PLAN NOTE
- COVID-19 testing   - Infection Control notified     - Isolation:   - Airborne, Contact and Droplet Precautions  - Cohort patients into COVID units  - N95 mask, wear eye protection  - 20 second hand hygiene              - Limit visitors per hospital policy              - Consolidating lab draws, nursing care, provider visits, and interventions    - Diagnostics: (leukopenia, hyponatremia, hyperferritinemia, elevated troponin, elevated d-dimer, age, and comorbidities are significant predictors of poor clinical outcome)  CBC, CMP, Procalcitonin, Ferritin, CRP, LDH, BNP, Troponin, ECG, Blood Culture x2, Portable CXR and UA and culture    - Management:  Supplemental O2 to maintain SpO2 >92%  Telemetry  Continuous/intermittent Pulse Ox  Albuterol treatment PRN  Remdesivir  Dexamethasone  FD lovenox per [facility] policy - patient counseled on risks and benefits and is agreeable.  Consulting pulmonology specialist for opinion.  Advance Care Planning   code status:  FULL code

## 2022-01-07 NOTE — PLAN OF CARE
Plan of care reviewed with pt and wife at bedside. VSS, afebrile, O2 92-94% on 8L high flow NC. Pt independent to ADL's and ambulatory. Tele [id number] continued, running NSR with. Isolation and safety precautions maintained.

## 2022-01-07 NOTE — CONSULTS
Thank you for your consult to [facility]. We have reviewed the patient chart. This patient does not meet criteria for [facility] service at this time due to Unstable COVID patient requiring >4LPM by nasal cannula. Will hand back to In-house service..    Please re-consult [facility] when patient's condition stabilizes.       Signing Physician:     Lavinia Cummins MD  Cell: [phone]  [facility]

## 2022-01-08 LAB
BASOPHILS # BLD AUTO: 0 K/UL (ref 0–0.2)
BASOPHILS NFR BLD: 0 % (ref 0–1.9)
DIFFERENTIAL METHOD: ABNORMAL
EOSINOPHIL # BLD AUTO: 0 K/UL (ref 0–0.5)
EOSINOPHIL NFR BLD: 0 % (ref 0–8)
ERYTHROCYTE [DISTWIDTH] IN BLOOD BY AUTOMATED COUNT: 11.9 % (ref 11.5–14.5)
HCT VFR BLD AUTO: 44 % (ref 40–54)
HGB BLD-MCNC: 15.1 G/DL (ref 14–18)
IMM GRANULOCYTES # BLD AUTO: 0.09 K/UL (ref 0–0.04)
IMM GRANULOCYTES NFR BLD AUTO: 1 % (ref 0–0.5)
LYMPHOCYTES # BLD AUTO: 1.4 K/UL (ref 1–4.8)
LYMPHOCYTES NFR BLD: 15 % (ref 18–48)
MCH RBC QN AUTO: 29.7 PG (ref 27–31)
MCHC RBC AUTO-ENTMCNC: 34.3 G/DL (ref 32–36)
MCV RBC AUTO: 86 FL (ref 82–98)
MONOCYTES # BLD AUTO: 0.9 K/UL (ref 0.3–1)
MONOCYTES NFR BLD: 9.6 % (ref 4–15)
NEUTROPHILS # BLD AUTO: 6.7 K/UL (ref 1.8–7.7)
NEUTROPHILS NFR BLD: 74.4 % (ref 38–73)
NRBC BLD-RTO: 0 /100 WBC
PLATELET # BLD AUTO: 371 K/UL (ref 150–450)
PMV BLD AUTO: 9 FL (ref 9.2–12.9)
RBC # BLD AUTO: 5.09 M/UL (ref 4.6–6.2)
WBC # BLD AUTO: 9.05 K/UL (ref 3.9–12.7)

## 2022-01-08 PROCEDURE — 99233 PR SUBSEQUENT HOSPITAL CARE,LEVL III: ICD-10-PCS | Mod: ,,, | Performed by: INTERNAL MEDICINE

## 2022-01-08 PROCEDURE — 25000003 PHARM REV CODE 250: Performed by: HOSPITALIST

## 2022-01-08 PROCEDURE — 63600175 PHARM REV CODE 636 W HCPCS: Performed by: INTERNAL MEDICINE

## 2022-01-08 PROCEDURE — 86704 HEP B CORE ANTIBODY TOTAL: CPT | Performed by: INTERNAL MEDICINE

## 2022-01-08 PROCEDURE — 25000242 PHARM REV CODE 250 ALT 637 W/ HCPCS: Performed by: NURSE PRACTITIONER

## 2022-01-08 PROCEDURE — 99233 SBSQ HOSP IP/OBS HIGH 50: CPT | Mod: ,,, | Performed by: INTERNAL MEDICINE

## 2022-01-08 PROCEDURE — 94799 UNLISTED PULMONARY SVC/PX: CPT

## 2022-01-08 PROCEDURE — 94640 AIRWAY INHALATION TREATMENT: CPT

## 2022-01-08 PROCEDURE — 25000003 PHARM REV CODE 250: Performed by: INTERNAL MEDICINE

## 2022-01-08 PROCEDURE — 36415 COLL VENOUS BLD VENIPUNCTURE: CPT | Performed by: INTERNAL MEDICINE

## 2022-01-08 PROCEDURE — 63600175 PHARM REV CODE 636 W HCPCS: Performed by: NURSE PRACTITIONER

## 2022-01-08 PROCEDURE — 27000207 HC ISOLATION

## 2022-01-08 PROCEDURE — 85025 COMPLETE CBC W/AUTO DIFF WBC: CPT | Performed by: NURSE PRACTITIONER

## 2022-01-08 PROCEDURE — 12000002 HC ACUTE/MED SURGE SEMI-PRIVATE ROOM

## 2022-01-08 PROCEDURE — 36415 COLL VENOUS BLD VENIPUNCTURE: CPT | Performed by: NURSE PRACTITIONER

## 2022-01-08 PROCEDURE — 27000221 HC OXYGEN, UP TO 24 HOURS

## 2022-01-08 PROCEDURE — 25000242 PHARM REV CODE 250 ALT 637 W/ HCPCS: Performed by: INTERNAL MEDICINE

## 2022-01-08 PROCEDURE — 87340 HEPATITIS B SURFACE AG IA: CPT | Performed by: INTERNAL MEDICINE

## 2022-01-08 PROCEDURE — 25000003 PHARM REV CODE 250: Performed by: NURSE PRACTITIONER

## 2022-01-08 PROCEDURE — 94761 N-INVAS EAR/PLS OXIMETRY MLT: CPT

## 2022-01-08 RX ORDER — CETIRIZINE HYDROCHLORIDE 10 MG/1
10 TABLET ORAL DAILY
Status: DISCONTINUED | OUTPATIENT
Start: 2022-01-08 | End: 2022-01-17 | Stop reason: HOSPADM

## 2022-01-08 RX ORDER — FLUTICASONE PROPIONATE 50 MCG
2 SPRAY, SUSPENSION (ML) NASAL 2 TIMES DAILY
Status: DISCONTINUED | OUTPATIENT
Start: 2022-01-08 | End: 2022-01-17 | Stop reason: HOSPADM

## 2022-01-08 RX ADMIN — LEVETIRACETAM 500 MG: 500 TABLET, FILM COATED ORAL at 09:01

## 2022-01-08 RX ADMIN — OXYCODONE HYDROCHLORIDE AND ACETAMINOPHEN 500 MG: 500 TABLET ORAL at 09:01

## 2022-01-08 RX ADMIN — TOCILIZUMAB 652 MG: 20 INJECTION, SOLUTION, CONCENTRATE INTRAVENOUS at 01:01

## 2022-01-08 RX ADMIN — FLUTICASONE PROPIONATE 100 MCG: 50 SPRAY, METERED NASAL at 10:01

## 2022-01-08 RX ADMIN — ALBUTEROL SULFATE 2 PUFF: 90 AEROSOL, METERED RESPIRATORY (INHALATION) at 12:01

## 2022-01-08 RX ADMIN — GUAIFENESIN 600 MG: 600 TABLET, EXTENDED RELEASE ORAL at 09:01

## 2022-01-08 RX ADMIN — GUAIFENESIN 600 MG: 600 TABLET, EXTENDED RELEASE ORAL at 08:01

## 2022-01-08 RX ADMIN — THERA TABS 1 TABLET: TAB at 09:01

## 2022-01-08 RX ADMIN — CETIRIZINE HYDROCHLORIDE 10 MG: 10 TABLET, FILM COATED ORAL at 10:01

## 2022-01-08 RX ADMIN — ENOXAPARIN SODIUM 80 MG: 80 INJECTION SUBCUTANEOUS at 09:01

## 2022-01-08 RX ADMIN — REMDESIVIR 100 MG: 100 INJECTION, POWDER, LYOPHILIZED, FOR SOLUTION INTRAVENOUS at 09:01

## 2022-01-08 RX ADMIN — ATORVASTATIN CALCIUM 20 MG: 20 TABLET, FILM COATED ORAL at 09:01

## 2022-01-08 RX ADMIN — OXYCODONE HYDROCHLORIDE AND ACETAMINOPHEN 500 MG: 500 TABLET ORAL at 08:01

## 2022-01-08 RX ADMIN — ALBUTEROL SULFATE 2 PUFF: 90 AEROSOL, METERED RESPIRATORY (INHALATION) at 08:01

## 2022-01-08 RX ADMIN — FLUTICASONE PROPIONATE 100 MCG: 50 SPRAY, METERED NASAL at 08:01

## 2022-01-08 RX ADMIN — LEVETIRACETAM 500 MG: 500 TABLET, FILM COATED ORAL at 08:01

## 2022-01-08 RX ADMIN — ALBUTEROL SULFATE 2 PUFF: 90 AEROSOL, METERED RESPIRATORY (INHALATION) at 07:01

## 2022-01-08 RX ADMIN — ALBUTEROL SULFATE 2 PUFF: 90 AEROSOL, METERED RESPIRATORY (INHALATION) at 01:01

## 2022-01-08 RX ADMIN — DEXAMETHASONE 6 MG: 4 TABLET ORAL at 09:01

## 2022-01-08 RX ADMIN — ENOXAPARIN SODIUM 80 MG: 80 INJECTION SUBCUTANEOUS at 08:01

## 2022-01-08 NOTE — PROGRESS NOTES
[facility] Hospital Medicine  Progress Note    Patient Name: Stefano Donahue Jr.  MRN: [id number]  Patient Class: IP- Inpatient   Admission Date: 1/5/2022  Length of Stay: 3 days  Attending Physician: Estrella Lama MD  Primary Care Provider: Dario Ayon MD        Subjective:     Principal Problem:Pneumonia due to COVID-19 virus        HPI:  Stefano Donahue Jr. Is a 55 y/o male with a past medical history significant for HLD and seizures who presented to the ED for further evaluation of hypoxia.  He was diagnosed with COVID-19 one week ago.  His wife purchased a pulse ox in order to check the patient's oxygen saturation and today was noted to have a sat of 88%.  He was evaluated in the urgent care and thought to have pneumonia so he was sent to the ED.  During my interview, the patient's oxygen saturation was noted at 88-89% on room air and he was placed on supplemental oxygen.  Denies feeling SOB or chest pain, no nausea or vomiting.  He is placed in observation under the service of hospital medicine for continued medical management.         Overview/Hospital Course:  56-year-old male with history of hyperlipidemia and seizures admitted to the hospital medicine service with hypoxic respiratory failure secondary to COVID He was placed on supplemental oxygen, p.o. dexamethasone, Remdesivir and full-dose Lovenox.        Interval History: Patient seen and examined. Wife at bedisde.  Overnight patient is requiring much higher quantities of supplemental oxygen, now on 12 liter/minute via nasal cannula.  Day number 4 remdesivir today. On PO dexamethsone.  Complaining of nasal congestion.  Wife is at bedside.    Review of Systems   Constitutional: Negative for chills and fever.   HENT: Negative for congestion and sore throat.    Respiratory: Positive for cough. Negative for shortness of breath.    Cardiovascular: Negative for chest pain and palpitations.   Gastrointestinal: Negative for abdominal pain,  diarrhea, nausea and vomiting.   Genitourinary: Negative for flank pain and hematuria.   Musculoskeletal: Negative for neck pain and neck stiffness.   Skin: Negative for pallor and rash.   Neurological: Positive for headaches. Negative for weakness.   Psychiatric/Behavioral: Negative for agitation and confusion.   All other systems reviewed and are negative.    Objective:     Vital Signs (Most Recent):  Temp: 97.4 °F (36.3 °C) (01/08/22 0910)  Pulse: 87 (01/08/22 0910)  Resp: 20 (01/08/22 0910)  BP: 135/83 (01/08/22 0910)  SpO2: (!) 90 % (01/08/22 0910) Vital Signs (24h Range):  Temp:  [97.4 °F (36.3 °C)-98.3 °F (36.8 °C)] 97.4 °F (36.3 °C)  Pulse:  [64-87] 87  Resp:  [18-20] 20  SpO2:  [90 %-97 %] 90 %  BP: (124-140)/(58-83) 135/83     Weight: 81.6 kg (180 lb)  Body mass index is 26.58 kg/m².    Intake/Output Summary (Last 24 hours) at 1/8/2022 1004  Last data filed at 1/8/2022 0455  Gross per 24 hour   Intake 370 ml   Output --   Net 370 ml      Physical Exam  Constitutional:       General: He is not in acute distress.     Appearance: He is well-developed.   HENT:      Head: Normocephalic and atraumatic.   Eyes:      Conjunctiva/sclera: Conjunctivae normal.      Pupils: Pupils are equal, round, and reactive to light.   Cardiovascular:      Rate and Rhythm: Normal rate and regular rhythm.      Heart sounds: Normal heart sounds.   Pulmonary:      Effort: No tachypnea.      Breath sounds: Decreased breath sounds and rales (bibasilar, L>R) present.   Abdominal:      General: Bowel sounds are normal. There is no distension.      Palpations: Abdomen is soft.      Tenderness: There is no abdominal tenderness.   Musculoskeletal:         General: Normal range of motion.      Cervical back: Normal range of motion and neck supple.   Skin:     General: Skin is warm and dry.   Neurological:      Mental Status: He is alert and oriented to person, place, and time.   Psychiatric:         Behavior: Behavior normal.          Significant Labs:   Lab Results   Component Value Date    WBC 9.05 01/08/2022    HGB 15.1 01/08/2022    HCT 44.0 01/08/2022    MCV 86 01/08/2022     01/08/2022     CMP  Sodium   Date Value Ref Range Status   01/07/2022 138 136 - 145 mmol/L Final     Potassium   Date Value Ref Range Status   01/07/2022 4.3 3.5 - 5.1 mmol/L Final     Chloride   Date Value Ref Range Status   01/07/2022 102 95 - 110 mmol/L Final     CO2   Date Value Ref Range Status   01/07/2022 25 23 - 29 mmol/L Final     Glucose   Date Value Ref Range Status   01/07/2022 146 (H) 70 - 110 mg/dL Final     BUN   Date Value Ref Range Status   01/07/2022 22 (H) 6 - 20 mg/dL Final     Creatinine   Date Value Ref Range Status   01/07/2022 0.8 0.5 - 1.4 mg/dL Final     Calcium   Date Value Ref Range Status   01/07/2022 9.1 8.7 - 10.5 mg/dL Final     Total Protein   Date Value Ref Range Status   01/07/2022 7.0 6.0 - 8.4 g/dL Final     Albumin   Date Value Ref Range Status   01/07/2022 3.1 (L) 3.5 - 5.2 g/dL Final     Total Bilirubin   Date Value Ref Range Status   01/07/2022 0.5 0.1 - 1.0 mg/dL Final     Comment:     For infants and newborns, interpretation of results should be based  on gestational age, weight and in agreement with clinical  observations.    Premature Infant recommended reference ranges:  Up to 24 hours.............<8.0 mg/dL  Up to 48 hours............<12.0 mg/dL  3-5 days..................<15.0 mg/dL  6-29 days.................<15.0 mg/dL       Alkaline Phosphatase   Date Value Ref Range Status   01/07/2022 113 55 - 135 U/L Final     AST   Date Value Ref Range Status   01/07/2022 50 (H) 10 - 40 U/L Final     ALT   Date Value Ref Range Status   01/07/2022 54 (H) 10 - 44 U/L Final     Anion Gap   Date Value Ref Range Status   01/07/2022 11 8 - 16 mmol/L Final     eGFR if    Date Value Ref Range Status   01/07/2022 >60 >60 mL/min/1.73 m^2 Final     eGFR if non    Date Value Ref Range Status    01/07/2022 >60 >60 mL/min/1.73 m^2 Final     Comment:     Calculation used to obtain the estimated glomerular filtration  rate (eGFR) is the CKD-EPI equation.        Microbiology Results (last 7 days)     ** No results found for the last 168 hours. **        Significant Imaging:  CXR:   Patchy interstitial infiltrates in the mid and lower lung fields likely due to a viral pneumonitis.    CXR:   Negative chest.  No appreciable infiltrate on this exam.      Assessment/Plan:      * Pneumonia due to COVID-19 virus  - COVID-19 testing   - Infection Control notified     - Isolation:   - Airborne, Contact and Droplet Precautions  - Cohort patients into COVID units  - N95 mask, wear eye protection  - 20 second hand hygiene              - Limit visitors per hospital policy              - Consolidating lab draws, nursing care, provider visits, and interventions    - Diagnostics: (leukopenia, hyponatremia, hyperferritinemia, elevated troponin, elevated d-dimer, age, and comorbidities are significant predictors of poor clinical outcome)  CBC, CMP, Procalcitonin, Ferritin, CRP, LDH, BNP, Troponin, ECG, Blood Culture x2, Portable CXR and UA and culture    - Management:  Supplemental O2 to maintain SpO2 >92%  Telemetry  Continuous/intermittent Pulse Ox  Albuterol treatment PRN  Remdesivir  Dexamethasone  Discussed with Dr. Choi, patient to receive IV tocilizumab today.   FD lovenox per [facility] policy - patient counseled on risks and benefits and is agreeable.    Advance Care Planning   code status:  FULL code               Seizures  Chronic condition.  Maintain seizure/fall/aspiration precautions.  Continue chronic keppra.      Acute respiratory failure with hypoxia  Patient with Hypoxic Respiratory failure which is Acute.  he is not on home oxygen. Supplemental oxygen was provided and noted-  .   Signs/symptoms of respiratory failure include- tachypnea. Contributing diagnoses includes - COVID 19 Labs and images were reviewed.  Patient Has not had a recent ABG. Will treat underlying causes and adjust management of respiratory failure as follows- continue supplemental oxygen as needed.    Hyperlipidemia   Patient is chronically on statin.will continue for now. Monitor clinically. Last LDL was No results found for: LDLCALC         Discussed with patient's wife, answered all questions.  VTE Risk Mitigation (From admission, onward)         Ordered     enoxaparin injection 80 mg  2 times daily         01/05/22 0626                Discharge Planning   NEL: 1/11/2022     Code Status: Full Code   Is the patient medically ready for discharge?:     Reason for patient still in hospital (select all that apply): Patient trending condition  Discharge Plan A: Home                  Estrella Lama MD  Department of Hospital Medicine   [facility]

## 2022-01-08 NOTE — ASSESSMENT & PLAN NOTE
- COVID-19 testing   - Infection Control notified     - Isolation:   - Airborne, Contact and Droplet Precautions  - Cohort patients into COVID units  - N95 mask, wear eye protection  - 20 second hand hygiene              - Limit visitors per hospital policy              - Consolidating lab draws, nursing care, provider visits, and interventions    - Diagnostics: (leukopenia, hyponatremia, hyperferritinemia, elevated troponin, elevated d-dimer, age, and comorbidities are significant predictors of poor clinical outcome)  CBC, CMP, Procalcitonin, Ferritin, CRP, LDH, BNP, Troponin, ECG, Blood Culture x2, Portable CXR and UA and culture    - Management:  Supplemental O2 to maintain SpO2 >92%  Telemetry  Continuous/intermittent Pulse Ox  Albuterol treatment PRN  Remdesivir  Dexamethasone  Discussed with Dr. Choi, patient to receive IV tocilizumab today.   FD lovenox per [facility] policy - patient counseled on risks and benefits and is agreeable.    Advance Care Planning   code status:  FULL code

## 2022-01-08 NOTE — CARE UPDATE
01/07/22 2014   Patient Assessment/Suction   Level of Consciousness (AVPU) alert   Respiratory Effort Normal;Unlabored   Expansion/Accessory Muscles/Retractions no use of accessory muscles   All Lung Fields Breath Sounds diminished   Rhythm/Pattern, Respiratory unlabored   Cough Frequency infrequent   Cough Type good;dry   PRE-TX-O2   O2 Device (Oxygen Therapy) High Flow nasal Cannula   $ Is the patient on Low Flow Oxygen? Yes   Flow (L/min) 8   SpO2 (!) 91 %   Pulse Oximetry Type Continuous   $ Pulse Oximetry - Multiple Charge Pulse Oximetry - Multiple   Pulse 80   Resp 20   Inhaler   $ Inhaler Charges MDI (Metered Dose Inahler) Treatment   Respiratory Treatment Status (Inhaler) given   Treatment Route (Inhaler) mouthpiece;spacer/holding chamber   Patient Position (Inhaler) sitting in chair   Post Treatment Assessment (Inhaler) breath sounds unchanged   Signs of Intolerance (Inhaler) none   Breath Sounds Post-Respiratory Treatment   Throughout All Fields Post-Treatment All Fields   Throughout All Fields Post-Treatment no change   Post-treatment Heart Rate (beats/min) 82   Post-treatment Resp Rate (breaths/min) 20   Incentive Spirometer   $ Incentive Spirometer Charges done with encouragement;done independently per patient   Administration (IS) instruction provided, follow-up;self-administered   Number of Repetitions (IS) 10   Level Incentive Spirometer (mL) 1500   Patient Tolerance (IS) good

## 2022-01-08 NOTE — PLAN OF CARE
Problem: Adult Inpatient Plan of Care  Goal: Plan of Care Review  Outcome: Ongoing, Progressing  Goal: Patient-Specific Goal (Individualized)  Outcome: Ongoing, Progressing  Goal: Absence of Hospital-Acquired Illness or Injury  Outcome: Ongoing, Progressing  Goal: Optimal Comfort and Wellbeing  Outcome: Ongoing, Progressing  Goal: Readiness for Transition of Care  Outcome: Ongoing, Progressing     Problem: Fall Injury Risk  Goal: Absence of Fall and Fall-Related Injury  Outcome: Ongoing, Progressing     Problem: Oral Intake Inadequate  Goal: Improved Oral Intake  Outcome: Ongoing, Progressing

## 2022-01-08 NOTE — PROGRESS NOTES
Progress Note  PULMONARY    Admit Date: 1/5/2022 01/08/2022      SUBJECTIVE:     1/8- worsening oxygenation, req 12L HFNC      OBJECTIVE:     Vitals (Most recent):  Vitals:    01/08/22 1119   BP: 126/72   Pulse: 91   Resp: 16   Temp: 98.7 °F (37.1 °C)       Vitals (24 hour range):  Temp:  [97.4 °F (36.3 °C)-98.7 °F (37.1 °C)]   Pulse:  [64-91]   Resp:  [16-20]   BP: (124-140)/(58-83)   SpO2:  [88 %-97 %]       Intake/Output Summary (Last 24 hours) at 1/8/2022 1124  Last data filed at 1/8/2022 0455  Gross per 24 hour   Intake 370 ml   Output --   Net 370 ml          Physical Exam:  The patient's neuro status (alertness,orientation,cognitive function,motor skills,), pharyngeal exam (oral lesions, hygiene, abn dentition,), Neck (jvd,mass,thyroid,nodes in neck and above/below clavicle),RESPIRATORY(symmetry,effort,fremitus,percussion,auscultation),  Cor(rhythm,heart tones including gallops,perfusion,edema)ABD(distention,hepatic&splenomegaly,tenderness,masses), Skin(rash,cyanosis),Psyc(affect,judgement,).  Exam negative except for these pertinent findings:    No distress  Scattered rales bilaterally  No edema     Radiographs reviewed: view by direct vision   CXR 1/4/22- very mild pnuemonitis bilat bases    Labs     Recent Labs   Lab 01/08/22  0517   WBC 9.05   HGB 15.1   HCT 44.0      No results for input(s): NA, K, CL, CO2, BUN, CREATININE, GLU, CALCIUM, CAION, MG, PHOS, AST, ALT, ALKPHOS, BILITOT, BILIDIR, PROT, ALBUMIN, PREALBUMIN, AMYLASE, LIPASE, CRP, HSCRP, SEDRATE, PROCAL, INR, PTT, LABHEPA, LACTATE, TROPONINI, CPK, CPKMB, MB, BNP in the last 24 hours.No results for input(s): PH, PCO2, PO2, HCO3 in the last 24 hours.  Microbiology Results (last 7 days)     ** No results found for the last 168 hours. **          Impression:  Active Hospital Problems    Diagnosis  POA    *Pneumonia due to COVID-19 virus [U07.1, J12.82]  Yes    Acute respiratory failure with hypoxia [J96.01]  Yes    Seizures [R56.9]  Yes     Hyperlipidemia [E78.5]  Yes      Resolved Hospital Problems   No resolved problems to display.               Plan:       - continue supplemental oxygen to keep sats >92%  - prone as tolerated  - continue inhaled bronchodilators  - incentive spirometry   - continue RDV  - continue decadron 6mg daily  - ordering tocilizumab given continued worsening  - monitor inflammatory markers  - continue empiric therapeutic lovenox      Soila Choi MD  Pulmonary & Critical Care Medicine                              .

## 2022-01-08 NOTE — SUBJECTIVE & OBJECTIVE
Interval History: Patient seen and examined. Wife at [facility].  Overnight patient is requiring much higher quantities of supplemental oxygen, now on 12 liter/minute via nasal cannula.  Day number 4 remdesivir today. On PO dexamethsone.  Complaining of nasal congestion.  Wife is at bedside.    Review of Systems   Constitutional: Negative for chills and fever.   HENT: Negative for congestion and sore throat.    Respiratory: Positive for cough. Negative for shortness of breath.    Cardiovascular: Negative for chest pain and palpitations.   Gastrointestinal: Negative for abdominal pain, diarrhea, nausea and vomiting.   Genitourinary: Negative for flank pain and hematuria.   Musculoskeletal: Negative for neck pain and neck stiffness.   Skin: Negative for pallor and rash.   Neurological: Positive for headaches. Negative for weakness.   Psychiatric/Behavioral: Negative for agitation and confusion.   All other systems reviewed and are negative.    Objective:     Vital Signs (Most Recent):  Temp: 97.4 °F (36.3 °C) (01/08/22 0910)  Pulse: 87 (01/08/22 0910)  Resp: 20 (01/08/22 0910)  BP: 135/83 (01/08/22 0910)  SpO2: (!) 90 % (01/08/22 0910) Vital Signs (24h Range):  Temp:  [97.4 °F (36.3 °C)-98.3 °F (36.8 °C)] 97.4 °F (36.3 °C)  Pulse:  [64-87] 87  Resp:  [18-20] 20  SpO2:  [90 %-97 %] 90 %  BP: (124-140)/(58-83) 135/83     Weight: 81.6 kg (180 lb)  Body mass index is 26.58 kg/m².    Intake/Output Summary (Last 24 hours) at 1/8/2022 1004  Last data filed at 1/8/2022 0455  Gross per 24 hour   Intake 370 ml   Output --   Net 370 ml      Physical Exam  Constitutional:       General: He is not in acute distress.     Appearance: He is well-developed.   HENT:      Head: Normocephalic and atraumatic.   Eyes:      Conjunctiva/sclera: Conjunctivae normal.      Pupils: Pupils are equal, round, and reactive to light.   Cardiovascular:      Rate and Rhythm: Normal rate and regular rhythm.      Heart sounds: Normal heart sounds.    Pulmonary:      Effort: No tachypnea.      Breath sounds: Decreased breath sounds and rales (bibasilar, L>R) present.   Abdominal:      General: Bowel sounds are normal. There is no distension.      Palpations: Abdomen is soft.      Tenderness: There is no abdominal tenderness.   Musculoskeletal:         General: Normal range of motion.      Cervical back: Normal range of motion and neck supple.   Skin:     General: Skin is warm and dry.   Neurological:      Mental Status: He is alert and oriented to person, place, and time.   Psychiatric:         Behavior: Behavior normal.         Significant Labs:   Lab Results   Component Value Date    WBC 9.05 01/08/2022    HGB 15.1 01/08/2022    HCT 44.0 01/08/2022    MCV 86 01/08/2022     01/08/2022     CMP  Sodium   Date Value Ref Range Status   01/07/2022 138 136 - 145 mmol/L Final     Potassium   Date Value Ref Range Status   01/07/2022 4.3 3.5 - 5.1 mmol/L Final     Chloride   Date Value Ref Range Status   01/07/2022 102 95 - 110 mmol/L Final     CO2   Date Value Ref Range Status   01/07/2022 25 23 - 29 mmol/L Final     Glucose   Date Value Ref Range Status   01/07/2022 146 (H) 70 - 110 mg/dL Final     BUN   Date Value Ref Range Status   01/07/2022 22 (H) 6 - 20 mg/dL Final     Creatinine   Date Value Ref Range Status   01/07/2022 0.8 0.5 - 1.4 mg/dL Final     Calcium   Date Value Ref Range Status   01/07/2022 9.1 8.7 - 10.5 mg/dL Final     Total Protein   Date Value Ref Range Status   01/07/2022 7.0 6.0 - 8.4 g/dL Final     Albumin   Date Value Ref Range Status   01/07/2022 3.1 (L) 3.5 - 5.2 g/dL Final     Total Bilirubin   Date Value Ref Range Status   01/07/2022 0.5 0.1 - 1.0 mg/dL Final     Comment:     For infants and newborns, interpretation of results should be based  on gestational age, weight and in agreement with clinical  observations.    Premature Infant recommended reference ranges:  Up to 24 hours.............<8.0 mg/dL  Up to 48  hours............<12.0 mg/dL  3-5 days..................<15.0 mg/dL  6-29 days.................<15.0 mg/dL       Alkaline Phosphatase   Date Value Ref Range Status   01/07/2022 113 55 - 135 U/L Final     AST   Date Value Ref Range Status   01/07/2022 50 (H) 10 - 40 U/L Final     ALT   Date Value Ref Range Status   01/07/2022 54 (H) 10 - 44 U/L Final     Anion Gap   Date Value Ref Range Status   01/07/2022 11 8 - 16 mmol/L Final     eGFR if    Date Value Ref Range Status   01/07/2022 >60 >60 mL/min/1.73 m^2 Final     eGFR if non    Date Value Ref Range Status   01/07/2022 >60 >60 mL/min/1.73 m^2 Final     Comment:     Calculation used to obtain the estimated glomerular filtration  rate (eGFR) is the CKD-EPI equation.        Microbiology Results (last 7 days)     ** No results found for the last 168 hours. **        Significant Imaging:  CXR:   Patchy interstitial infiltrates in the mid and lower lung fields likely due to a viral pneumonitis.    CXR:   Negative chest.  No appreciable infiltrate on this exam.

## 2022-01-09 LAB
CRP SERPL-MCNC: 25 MG/L (ref 0–8.2)
D DIMER PPP IA.FEU-MCNC: 0.36 MG/L FEU

## 2022-01-09 PROCEDURE — 94640 AIRWAY INHALATION TREATMENT: CPT

## 2022-01-09 PROCEDURE — 99233 PR SUBSEQUENT HOSPITAL CARE,LEVL III: ICD-10-PCS | Mod: ,,, | Performed by: INTERNAL MEDICINE

## 2022-01-09 PROCEDURE — 20000000 HC ICU ROOM

## 2022-01-09 PROCEDURE — 94664 DEMO&/EVAL PT USE INHALER: CPT

## 2022-01-09 PROCEDURE — 94799 UNLISTED PULMONARY SVC/PX: CPT

## 2022-01-09 PROCEDURE — 25000003 PHARM REV CODE 250: Performed by: NURSE PRACTITIONER

## 2022-01-09 PROCEDURE — 36415 COLL VENOUS BLD VENIPUNCTURE: CPT | Performed by: HOSPITALIST

## 2022-01-09 PROCEDURE — 25000242 PHARM REV CODE 250 ALT 637 W/ HCPCS: Performed by: NURSE PRACTITIONER

## 2022-01-09 PROCEDURE — 27000646 HC AEROBIKA DEVICE

## 2022-01-09 PROCEDURE — 86140 C-REACTIVE PROTEIN: CPT | Performed by: HOSPITALIST

## 2022-01-09 PROCEDURE — 94660 CPAP INITIATION&MGMT: CPT

## 2022-01-09 PROCEDURE — 27000221 HC OXYGEN, UP TO 24 HOURS

## 2022-01-09 PROCEDURE — 25000003 PHARM REV CODE 250: Performed by: INTERNAL MEDICINE

## 2022-01-09 PROCEDURE — 27000190 HC CPAP FULL FACE MASK W/VALVE

## 2022-01-09 PROCEDURE — 25000003 PHARM REV CODE 250: Performed by: HOSPITALIST

## 2022-01-09 PROCEDURE — 94761 N-INVAS EAR/PLS OXIMETRY MLT: CPT

## 2022-01-09 PROCEDURE — 99233 SBSQ HOSP IP/OBS HIGH 50: CPT | Mod: ,,, | Performed by: INTERNAL MEDICINE

## 2022-01-09 PROCEDURE — 99900035 HC TECH TIME PER 15 MIN (STAT)

## 2022-01-09 PROCEDURE — 85379 FIBRIN DEGRADATION QUANT: CPT | Performed by: HOSPITALIST

## 2022-01-09 PROCEDURE — 63600175 PHARM REV CODE 636 W HCPCS: Performed by: NURSE PRACTITIONER

## 2022-01-09 PROCEDURE — 27000207 HC ISOLATION

## 2022-01-09 RX ADMIN — OXYCODONE HYDROCHLORIDE AND ACETAMINOPHEN 500 MG: 500 TABLET ORAL at 10:01

## 2022-01-09 RX ADMIN — ENOXAPARIN SODIUM 80 MG: 80 INJECTION SUBCUTANEOUS at 08:01

## 2022-01-09 RX ADMIN — ENOXAPARIN SODIUM 80 MG: 80 INJECTION SUBCUTANEOUS at 10:01

## 2022-01-09 RX ADMIN — ALBUTEROL SULFATE 2 PUFF: 90 AEROSOL, METERED RESPIRATORY (INHALATION) at 07:01

## 2022-01-09 RX ADMIN — ALBUTEROL SULFATE 2 PUFF: 90 AEROSOL, METERED RESPIRATORY (INHALATION) at 12:01

## 2022-01-09 RX ADMIN — THERA TABS 1 TABLET: TAB at 10:01

## 2022-01-09 RX ADMIN — FLUTICASONE PROPIONATE 100 MCG: 50 SPRAY, METERED NASAL at 08:01

## 2022-01-09 RX ADMIN — CETIRIZINE HYDROCHLORIDE 10 MG: 10 TABLET, FILM COATED ORAL at 10:01

## 2022-01-09 RX ADMIN — GUAIFENESIN 600 MG: 600 TABLET, EXTENDED RELEASE ORAL at 10:01

## 2022-01-09 RX ADMIN — LEVETIRACETAM 500 MG: 500 TABLET, FILM COATED ORAL at 10:01

## 2022-01-09 RX ADMIN — OXYCODONE HYDROCHLORIDE AND ACETAMINOPHEN 500 MG: 500 TABLET ORAL at 08:01

## 2022-01-09 RX ADMIN — GUAIFENESIN 600 MG: 600 TABLET, EXTENDED RELEASE ORAL at 08:01

## 2022-01-09 RX ADMIN — ATORVASTATIN CALCIUM 20 MG: 20 TABLET, FILM COATED ORAL at 10:01

## 2022-01-09 RX ADMIN — LEVETIRACETAM 500 MG: 500 TABLET, FILM COATED ORAL at 08:01

## 2022-01-09 RX ADMIN — DEXAMETHASONE 6 MG: 4 TABLET ORAL at 10:01

## 2022-01-09 RX ADMIN — FLUTICASONE PROPIONATE 100 MCG: 50 SPRAY, METERED NASAL at 09:01

## 2022-01-09 RX ADMIN — REMDESIVIR 100 MG: 100 INJECTION, POWDER, LYOPHILIZED, FOR SOLUTION INTRAVENOUS at 10:01

## 2022-01-09 NOTE — CARE UPDATE
01/08/22 1940   Patient Assessment/Suction   Level of Consciousness (AVPU) alert   Respiratory Effort Normal;Unlabored   Expansion/Accessory Muscles/Retractions no use of accessory muscles   All Lung Fields Breath Sounds diminished   Rhythm/Pattern, Respiratory unlabored   Cough Frequency infrequent   Cough Type dry;nonproductive   PRE-TX-O2   O2 Device (Oxygen Therapy) High Flow nasal Cannula   $ Is the patient on Low Flow Oxygen? Yes   Flow (L/min) (S)  12  (decreased to 10L, sats 99%)   SpO2 99 %   Pulse Oximetry Type Continuous   $ Pulse Oximetry - Multiple Charge Pulse Oximetry - Multiple   Pulse 65   Resp 20   Inhaler   $ Inhaler Charges MDI (Metered Dose Inahler) Treatment   Respiratory Treatment Status (Inhaler) given   Treatment Route (Inhaler) mouthpiece;spacer/holding chamber   Patient Position (Inhaler) sitting in chair   Post Treatment Assessment (Inhaler) breath sounds unchanged   Signs of Intolerance (Inhaler) none   Breath Sounds Post-Respiratory Treatment   Throughout All Fields Post-Treatment All Fields   Throughout All Fields Post-Treatment no change   Post-treatment Heart Rate (beats/min) 70   Post-treatment Resp Rate (breaths/min) 18   Incentive Spirometer   $ Incentive Spirometer Charges done with encouragement   Administration (IS) proper technique demonstrated;self-administered   Number of Repetitions (IS) 10   Level Incentive Spirometer (mL) 1500   Patient Tolerance (IS) good

## 2022-01-09 NOTE — PROGRESS NOTES
[facility] Hospital Medicine  Progress Note    Patient Name: Stefano Donahue Jr.  MRN: [id number]  Patient Class: IP- Inpatient   Admission Date: 1/5/2022  Length of Stay: 4 days  Attending Physician: Estrella Lama MD  Primary Care Provider: Dario Ayon MD        Subjective:     Principal Problem:Pneumonia due to COVID-19 virus        HPI:  Stefano Donahue Jr. Is a 57 y/o male with a past medical history significant for HLD and seizures who presented to the ED for further evaluation of hypoxia.  He was diagnosed with COVID-19 one week ago.  His wife purchased a pulse ox in order to check the patient's oxygen saturation and today was noted to have a sat of 88%.  He was evaluated in the urgent care and thought to have pneumonia so he was sent to the ED.  During my interview, the patient's oxygen saturation was noted at 88-89% on room air and he was placed on supplemental oxygen.  Denies feeling SOB or chest pain, no nausea or vomiting.  He is placed in observation under the service of hospital medicine for continued medical management.         Overview/Hospital Course:  56-year-old male with history of hyperlipidemia and seizures admitted to the hospital medicine service with hypoxic respiratory failure secondary to COVID He was placed on supplemental oxygen, p.o. dexamethasone, Remdesivir and full-dose Lovenox.        Interval History: Patient seen and examined.  Feeling better.  Wife at bedisde. On 10 liter/minute via nasal cannula.  Completed IV Remdesivir + Tocilizumab. On PO dexamethsone. Wife is at bedside.    Review of Systems   Constitutional: Negative for chills and fever.   HENT: Negative for congestion and sore throat.    Respiratory: Positive for cough. Negative for shortness of breath.    Cardiovascular: Negative for chest pain and palpitations.   Gastrointestinal: Negative for abdominal pain, diarrhea, nausea and vomiting.   Genitourinary: Negative for flank pain and hematuria.    Musculoskeletal: Negative for neck pain and neck stiffness.   Skin: Negative for pallor and rash.   Neurological: Positive for headaches. Negative for weakness.   Psychiatric/Behavioral: Negative for agitation and confusion.   All other systems reviewed and are negative.    Objective:     Vital Signs (Most Recent):  Temp: 96.6 °F (35.9 °C) (01/09/22 0836)  Pulse: 78 (01/09/22 0836)  Resp: 18 (01/09/22 0836)  BP: 120/73 (01/09/22 0836)  SpO2: (!) 89 % (01/09/22 0836) Vital Signs (24h Range):  Temp:  [96.5 °F (35.8 °C)-98.7 °F (37.1 °C)] 96.6 °F (35.9 °C)  Pulse:  [65-91] 78  Resp:  [16-21] 18  SpO2:  [88 %-99 %] 89 %  BP: (109-132)/(67-75) 120/73     Weight: 81.6 kg (180 lb)  Body mass index is 26.58 kg/m².    Intake/Output Summary (Last 24 hours) at 1/9/2022 1001  Last data filed at 1/9/2022 0438  Gross per 24 hour   Intake 452.31 ml   Output --   Net 452.31 ml      Physical Exam  Constitutional:       General: He is not in acute distress.     Appearance: He is well-developed.   HENT:      Head: Normocephalic and atraumatic.   Eyes:      Conjunctiva/sclera: Conjunctivae normal.      Pupils: Pupils are equal, round, and reactive to light.   Cardiovascular:      Rate and Rhythm: Normal rate and regular rhythm.      Heart sounds: Normal heart sounds.   Pulmonary:      Effort: No tachypnea.      Breath sounds: Decreased breath sounds and rales (bibasilar, L>R) present.   Abdominal:      General: Bowel sounds are normal. There is no distension.      Palpations: Abdomen is soft.      Tenderness: There is no abdominal tenderness.   Musculoskeletal:         General: Normal range of motion.      Cervical back: Normal range of motion and neck supple.   Skin:     General: Skin is warm and dry.   Neurological:      Mental Status: He is alert and oriented to person, place, and time.   Psychiatric:         Behavior: Behavior normal.         Significant Labs:   Lab Results   Component Value Date    WBC 9.05 01/08/2022    HGB  15.1 01/08/2022    HCT 44.0 01/08/2022    MCV 86 01/08/2022     01/08/2022     CMP  Sodium   Date Value Ref Range Status   01/07/2022 138 136 - 145 mmol/L Final     Potassium   Date Value Ref Range Status   01/07/2022 4.3 3.5 - 5.1 mmol/L Final     Chloride   Date Value Ref Range Status   01/07/2022 102 95 - 110 mmol/L Final     CO2   Date Value Ref Range Status   01/07/2022 25 23 - 29 mmol/L Final     Glucose   Date Value Ref Range Status   01/07/2022 146 (H) 70 - 110 mg/dL Final     BUN   Date Value Ref Range Status   01/07/2022 22 (H) 6 - 20 mg/dL Final     Creatinine   Date Value Ref Range Status   01/07/2022 0.8 0.5 - 1.4 mg/dL Final     Calcium   Date Value Ref Range Status   01/07/2022 9.1 8.7 - 10.5 mg/dL Final     Total Protein   Date Value Ref Range Status   01/07/2022 7.0 6.0 - 8.4 g/dL Final     Albumin   Date Value Ref Range Status   01/07/2022 3.1 (L) 3.5 - 5.2 g/dL Final     Total Bilirubin   Date Value Ref Range Status   01/07/2022 0.5 0.1 - 1.0 mg/dL Final     Comment:     For infants and newborns, interpretation of results should be based  on gestational age, weight and in agreement with clinical  observations.    Premature Infant recommended reference ranges:  Up to 24 hours.............<8.0 mg/dL  Up to 48 hours............<12.0 mg/dL  3-5 days..................<15.0 mg/dL  6-29 days.................<15.0 mg/dL       Alkaline Phosphatase   Date Value Ref Range Status   01/07/2022 113 55 - 135 U/L Final     AST   Date Value Ref Range Status   01/07/2022 50 (H) 10 - 40 U/L Final     ALT   Date Value Ref Range Status   01/07/2022 54 (H) 10 - 44 U/L Final     Anion Gap   Date Value Ref Range Status   01/07/2022 11 8 - 16 mmol/L Final     eGFR if    Date Value Ref Range Status   01/07/2022 >60 >60 mL/min/1.73 m^2 Final     eGFR if non    Date Value Ref Range Status   01/07/2022 >60 >60 mL/min/1.73 m^2 Final     Comment:     Calculation used to obtain the  estimated glomerular filtration  rate (eGFR) is the CKD-EPI equation.        Microbiology Results (last 7 days)     ** No results found for the last 168 hours. **        Significant Imaging:  CXR:   Patchy interstitial infiltrates in the mid and lower lung fields likely due to a viral pneumonitis.    CXR:   Negative chest.  No appreciable infiltrate on this exam.      Assessment/Plan:      * Pneumonia due to COVID-19 virus  - COVID-19 testing   - Infection Control notified     - Isolation:   - Airborne, Contact and Droplet Precautions  - Cohort patients into COVID units  - N95 mask, wear eye protection  - 20 second hand hygiene              - Limit visitors per hospital policy              - Consolidating lab draws, nursing care, provider visits, and interventions    - Diagnostics: (leukopenia, hyponatremia, hyperferritinemia, elevated troponin, elevated d-dimer, age, and comorbidities are significant predictors of poor clinical outcome)  CBC, CMP, Procalcitonin, Ferritin, CRP, LDH, BNP, Troponin, ECG, Blood Culture x2, Portable CXR and UA and culture    - Management:  Supplemental O2 to maintain SpO2 >92%  Telemetry  Continuous/intermittent Pulse Ox  Albuterol treatment PRN  Completed Remdesivir + Tocilizumab.  Inflammation markers trending down.  Dexamethasone  FD lovenox per [facility] policy - patient counseled on risks and benefits and is agreeable.    Advance Care Planning   code status:  FULL code               Seizures  Chronic condition.  Maintain seizure/fall/aspiration precautions.  Continue chronic keppra.      Acute respiratory failure with hypoxia  Patient with Hypoxic Respiratory failure which is Acute.  he is not on home oxygen. Supplemental oxygen was provided and noted-  .   Signs/symptoms of respiratory failure include- tachypnea. Contributing diagnoses includes - COVID 19 Labs and images were reviewed. Patient Has not had a recent ABG. Will treat underlying causes and adjust management of  respiratory failure as follows- continue supplemental oxygen as needed.    Hyperlipidemia   Patient is chronically on statin.will continue for now. Monitor clinically. Last LDL was No results found for: LDLCALC         Encourage patient to get out of bed, continue aggressive incentive spirometry, sleep in prone position.  VTE Risk Mitigation (From admission, onward)         Ordered     enoxaparin injection 80 mg  2 times daily         01/05/22 0626                Discharge Planning   NEL: 1/11/2022     Code Status: Full Code   Is the patient medically ready for discharge?:     Reason for patient still in hospital (select all that apply): Patient trending condition  Discharge Plan A: Home                  Estrella Lama MD  Department of Hospital Medicine   [facility]

## 2022-01-09 NOTE — PROGRESS NOTES
Pt placed on CPAP per MD order. Pt stated he will wear the CPAP initially for a few hours but would like to go back on HFNC after a few hours.

## 2022-01-09 NOTE — ASSESSMENT & PLAN NOTE
- COVID-19 testing   - Infection Control notified     - Isolation:   - Airborne, Contact and Droplet Precautions  - Cohort patients into COVID units  - N95 mask, wear eye protection  - 20 second hand hygiene              - Limit visitors per hospital policy              - Consolidating lab draws, nursing care, provider visits, and interventions    - Diagnostics: (leukopenia, hyponatremia, hyperferritinemia, elevated troponin, elevated d-dimer, age, and comorbidities are significant predictors of poor clinical outcome)  CBC, CMP, Procalcitonin, Ferritin, CRP, LDH, BNP, Troponin, ECG, Blood Culture x2, Portable CXR and UA and culture    - Management:  Supplemental O2 to maintain SpO2 >92%  Telemetry  Continuous/intermittent Pulse Ox  Albuterol treatment PRN  Completed Remdesivir + Tocilizumab.  Inflammation markers trending down.  Dexamethasone  FD lovenox per [facility] policy - patient counseled on risks and benefits and is agreeable.    Advance Care Planning   code status:  FULL code

## 2022-01-09 NOTE — PROGRESS NOTES
Progress Note  PULMONARY    Admit Date: 1/5/2022 01/09/2022      SUBJECTIVE:     1/8- worsening oxygenation, req 12L HFNC  1/9- on HFNC 10L this am, no fevers, VS stable      OBJECTIVE:     Vitals (Most recent):  Vitals:    01/09/22 0836   BP: 120/73   Pulse: 78   Resp: 18   Temp: 96.6 °F (35.9 °C)       Vitals (24 hour range):  Temp:  [96.5 °F (35.8 °C)-98.7 °F (37.1 °C)]   Pulse:  [65-91]   Resp:  [16-21]   BP: (109-132)/(67-75)   SpO2:  [88 %-99 %]       Intake/Output Summary (Last 24 hours) at 1/9/2022 1111  Last data filed at 1/9/2022 0438  Gross per 24 hour   Intake 452.31 ml   Output --   Net 452.31 ml          Physical Exam:  The patient's neuro status (alertness,orientation,cognitive function,motor skills,), pharyngeal exam (oral lesions, hygiene, abn dentition,), Neck (jvd,mass,thyroid,nodes in neck and above/below clavicle),RESPIRATORY(symmetry,effort,fremitus,percussion,auscultation),  Cor(rhythm,heart tones including gallops,perfusion,edema)ABD(distention,hepatic&splenomegaly,tenderness,masses), Skin(rash,cyanosis),Psyc(affect,judgement,).  Exam negative except for these pertinent findings:    No distress  Scattered rales bilaterally  No edema     Radiographs reviewed: view by direct vision   CXR 1/4/22- very mild pnuemonitis bilat bases    Labs     No results for input(s): WBC, HGB, HCT, PLT, BAND, METAMYELOCYT, MYELOPCT, HGBA1C in the last 24 hours.  Recent Labs   Lab 01/09/22  0825   CRP 25.0*   No results for input(s): PH, PCO2, PO2, HCO3 in the last 24 hours.  Microbiology Results (last 7 days)     ** No results found for the last 168 hours. **          Impression:  Active Hospital Problems    Diagnosis  POA    *Pneumonia due to COVID-19 virus [U07.1, J12.82]  Yes    Acute respiratory failure with hypoxia [J96.01]  Yes    Seizures [R56.9]  Yes    Hyperlipidemia [E78.5]  Yes      Resolved Hospital Problems   No resolved problems to display.               Plan:       - continue supplemental  oxygen to keep sats >92%- start cpap  - transfer to ICU   - prone as tolerated  - continue inhaled bronchodilators  - incentive spirometry   - continue RDV  - continue decadron 6mg daily  - received tocilizumab  - monitor inflammatory markers  - continue empiric therapeutic lovenox      Soila Choi MD  Pulmonary & Critical Care Medicine                              .

## 2022-01-09 NOTE — PLAN OF CARE
Problem: Adult Inpatient Plan of Care  Goal: Plan of Care Review  Outcome: Ongoing, Progressing  Goal: Patient-Specific Goal (Individualized)  Outcome: Ongoing, Progressing  Goal: Absence of Hospital-Acquired Illness or Injury  Outcome: Ongoing, Progressing  Goal: Optimal Comfort and Wellbeing  Outcome: Ongoing, Progressing  Goal: Readiness for Transition of Care  Outcome: Ongoing, Progressing     Problem: Fall Injury Risk  Goal: Absence of Fall and Fall-Related Injury  Outcome: Ongoing, Progressing     Problem: Oral Intake Inadequate  Goal: Improved Oral Intake  Outcome: Ongoing, Progressing     Problem: Fatigue  Goal: Improved Activity Tolerance  Outcome: Ongoing, Progressing     Problem: Skin Injury Risk Increased  Goal: Skin Health and Integrity  Outcome: Ongoing, Progressing      no complications

## 2022-01-09 NOTE — NURSING
Patient alert and oriented X4 sitting in chair at bedside sing incentive spirometer. Patient on 10 liters Hi-flow without any respiratory distress sating 89-90%. Morning meds given along with last dose of Remdesivir. Patient denies any distress or shortness of breath. No needs voiced at this time. Will continue to monitor.

## 2022-01-09 NOTE — SUBJECTIVE & OBJECTIVE
Interval History: Patient seen and examined.  Feeling better.  Wife at bedisde. On 10 liter/minute via nasal cannula.  Completed IV Remdesivir + Tocilizumab. On PO dexamethsone. Wife is at bedside.    Review of Systems   Constitutional: Negative for chills and fever.   HENT: Negative for congestion and sore throat.    Respiratory: Positive for cough. Negative for shortness of breath.    Cardiovascular: Negative for chest pain and palpitations.   Gastrointestinal: Negative for abdominal pain, diarrhea, nausea and vomiting.   Genitourinary: Negative for flank pain and hematuria.   Musculoskeletal: Negative for neck pain and neck stiffness.   Skin: Negative for pallor and rash.   Neurological: Positive for headaches. Negative for weakness.   Psychiatric/Behavioral: Negative for agitation and confusion.   All other systems reviewed and are negative.    Objective:     Vital Signs (Most Recent):  Temp: 96.6 °F (35.9 °C) (01/09/22 0836)  Pulse: 78 (01/09/22 0836)  Resp: 18 (01/09/22 0836)  BP: 120/73 (01/09/22 0836)  SpO2: (!) 89 % (01/09/22 0836) Vital Signs (24h Range):  Temp:  [96.5 °F (35.8 °C)-98.7 °F (37.1 °C)] 96.6 °F (35.9 °C)  Pulse:  [65-91] 78  Resp:  [16-21] 18  SpO2:  [88 %-99 %] 89 %  BP: (109-132)/(67-75) 120/73     Weight: 81.6 kg (180 lb)  Body mass index is 26.58 kg/m².    Intake/Output Summary (Last 24 hours) at 1/9/2022 1001  Last data filed at 1/9/2022 0438  Gross per 24 hour   Intake 452.31 ml   Output --   Net 452.31 ml      Physical Exam  Constitutional:       General: He is not in acute distress.     Appearance: He is well-developed.   HENT:      Head: Normocephalic and atraumatic.   Eyes:      Conjunctiva/sclera: Conjunctivae normal.      Pupils: Pupils are equal, round, and reactive to light.   Cardiovascular:      Rate and Rhythm: Normal rate and regular rhythm.      Heart sounds: Normal heart sounds.   Pulmonary:      Effort: No tachypnea.      Breath sounds: Decreased breath sounds and rales  (bibasilar, L>R) present.   Abdominal:      General: Bowel sounds are normal. There is no distension.      Palpations: Abdomen is soft.      Tenderness: There is no abdominal tenderness.   Musculoskeletal:         General: Normal range of motion.      Cervical back: Normal range of motion and neck supple.   Skin:     General: Skin is warm and dry.   Neurological:      Mental Status: He is alert and oriented to person, place, and time.   Psychiatric:         Behavior: Behavior normal.         Significant Labs:   Lab Results   Component Value Date    WBC 9.05 01/08/2022    HGB 15.1 01/08/2022    HCT 44.0 01/08/2022    MCV 86 01/08/2022     01/08/2022     CMP  Sodium   Date Value Ref Range Status   01/07/2022 138 136 - 145 mmol/L Final     Potassium   Date Value Ref Range Status   01/07/2022 4.3 3.5 - 5.1 mmol/L Final     Chloride   Date Value Ref Range Status   01/07/2022 102 95 - 110 mmol/L Final     CO2   Date Value Ref Range Status   01/07/2022 25 23 - 29 mmol/L Final     Glucose   Date Value Ref Range Status   01/07/2022 146 (H) 70 - 110 mg/dL Final     BUN   Date Value Ref Range Status   01/07/2022 22 (H) 6 - 20 mg/dL Final     Creatinine   Date Value Ref Range Status   01/07/2022 0.8 0.5 - 1.4 mg/dL Final     Calcium   Date Value Ref Range Status   01/07/2022 9.1 8.7 - 10.5 mg/dL Final     Total Protein   Date Value Ref Range Status   01/07/2022 7.0 6.0 - 8.4 g/dL Final     Albumin   Date Value Ref Range Status   01/07/2022 3.1 (L) 3.5 - 5.2 g/dL Final     Total Bilirubin   Date Value Ref Range Status   01/07/2022 0.5 0.1 - 1.0 mg/dL Final     Comment:     For infants and newborns, interpretation of results should be based  on gestational age, weight and in agreement with clinical  observations.    Premature Infant recommended reference ranges:  Up to 24 hours.............<8.0 mg/dL  Up to 48 hours............<12.0 mg/dL  3-5 days..................<15.0 mg/dL  6-29 days.................<15.0 mg/dL        Alkaline Phosphatase   Date Value Ref Range Status   01/07/2022 113 55 - 135 U/L Final     AST   Date Value Ref Range Status   01/07/2022 50 (H) 10 - 40 U/L Final     ALT   Date Value Ref Range Status   01/07/2022 54 (H) 10 - 44 U/L Final     Anion Gap   Date Value Ref Range Status   01/07/2022 11 8 - 16 mmol/L Final     eGFR if    Date Value Ref Range Status   01/07/2022 >60 >60 mL/min/1.73 m^2 Final     eGFR if non    Date Value Ref Range Status   01/07/2022 >60 >60 mL/min/1.73 m^2 Final     Comment:     Calculation used to obtain the estimated glomerular filtration  rate (eGFR) is the CKD-EPI equation.        Microbiology Results (last 7 days)     ** No results found for the last 168 hours. **        Significant Imaging:  CXR:   Patchy interstitial infiltrates in the mid and lower lung fields likely due to a viral pneumonitis.    CXR:   Negative chest.  No appreciable infiltrate on this exam.

## 2022-01-10 LAB
ALBUMIN SERPL BCP-MCNC: 3.2 G/DL (ref 3.5–5.2)
ALP SERPL-CCNC: 109 U/L (ref 55–135)
ALT SERPL W/O P-5'-P-CCNC: 156 U/L (ref 10–44)
ANION GAP SERPL CALC-SCNC: 12 MMOL/L (ref 8–16)
AST SERPL-CCNC: 77 U/L (ref 10–40)
BILIRUB SERPL-MCNC: 0.8 MG/DL (ref 0.1–1)
BUN SERPL-MCNC: 21 MG/DL (ref 6–20)
CALCIUM SERPL-MCNC: 9.4 MG/DL (ref 8.7–10.5)
CHLORIDE SERPL-SCNC: 103 MMOL/L (ref 95–110)
CO2 SERPL-SCNC: 21 MMOL/L (ref 23–29)
CREAT SERPL-MCNC: 0.8 MG/DL (ref 0.5–1.4)
CRP SERPL-MCNC: 11.1 MG/L (ref 0–8.2)
ERYTHROCYTE [DISTWIDTH] IN BLOOD BY AUTOMATED COUNT: 11.9 % (ref 11.5–14.5)
EST. GFR  (AFRICAN AMERICAN): >60 ML/MIN/1.73 M^2
EST. GFR  (NON AFRICAN AMERICAN): >60 ML/MIN/1.73 M^2
FERRITIN SERPL-MCNC: 1525 NG/ML (ref 20–300)
GLUCOSE SERPL-MCNC: 117 MG/DL (ref 70–110)
HAV IGM SERPL QL IA: NEGATIVE
HBV CORE AB SERPL QL IA: NEGATIVE
HBV CORE IGM SERPL QL IA: NEGATIVE
HBV SURFACE AG SERPL QL IA: NEGATIVE
HBV SURFACE AG SERPL QL IA: NEGATIVE
HCT VFR BLD AUTO: 46.7 % (ref 40–54)
HCV AB SERPL QL IA: NEGATIVE
HGB BLD-MCNC: 16.1 G/DL (ref 14–18)
MCH RBC QN AUTO: 29.5 PG (ref 27–31)
MCHC RBC AUTO-ENTMCNC: 34.5 G/DL (ref 32–36)
MCV RBC AUTO: 86 FL (ref 82–98)
PLATELET # BLD AUTO: 429 K/UL (ref 150–450)
PMV BLD AUTO: 8.6 FL (ref 9.2–12.9)
POTASSIUM SERPL-SCNC: 4.2 MMOL/L (ref 3.5–5.1)
PROT SERPL-MCNC: 7.1 G/DL (ref 6–8.4)
RBC # BLD AUTO: 5.45 M/UL (ref 4.6–6.2)
SODIUM SERPL-SCNC: 136 MMOL/L (ref 136–145)
WBC # BLD AUTO: 10.13 K/UL (ref 3.9–12.7)

## 2022-01-10 PROCEDURE — 27000207 HC ISOLATION

## 2022-01-10 PROCEDURE — 94660 CPAP INITIATION&MGMT: CPT

## 2022-01-10 PROCEDURE — 99291 PR CRITICAL CARE, E/M 30-74 MINUTES: ICD-10-PCS | Mod: ,,, | Performed by: INTERNAL MEDICINE

## 2022-01-10 PROCEDURE — 25000242 PHARM REV CODE 250 ALT 637 W/ HCPCS: Performed by: NURSE PRACTITIONER

## 2022-01-10 PROCEDURE — 94640 AIRWAY INHALATION TREATMENT: CPT

## 2022-01-10 PROCEDURE — 86140 C-REACTIVE PROTEIN: CPT | Performed by: INTERNAL MEDICINE

## 2022-01-10 PROCEDURE — 99900035 HC TECH TIME PER 15 MIN (STAT)

## 2022-01-10 PROCEDURE — 99291 CRITICAL CARE FIRST HOUR: CPT | Mod: ,,, | Performed by: INTERNAL MEDICINE

## 2022-01-10 PROCEDURE — 27100171 HC OXYGEN HIGH FLOW UP TO 24 HOURS

## 2022-01-10 PROCEDURE — 25000003 PHARM REV CODE 250: Performed by: INTERNAL MEDICINE

## 2022-01-10 PROCEDURE — 25000003 PHARM REV CODE 250: Performed by: NURSE PRACTITIONER

## 2022-01-10 PROCEDURE — 36415 COLL VENOUS BLD VENIPUNCTURE: CPT | Performed by: INTERNAL MEDICINE

## 2022-01-10 PROCEDURE — 20000000 HC ICU ROOM

## 2022-01-10 PROCEDURE — 94761 N-INVAS EAR/PLS OXIMETRY MLT: CPT

## 2022-01-10 PROCEDURE — 63600175 PHARM REV CODE 636 W HCPCS: Performed by: NURSE PRACTITIONER

## 2022-01-10 PROCEDURE — C9399 UNCLASSIFIED DRUGS OR BIOLOG: HCPCS | Performed by: NURSE PRACTITIONER

## 2022-01-10 PROCEDURE — 80053 COMPREHEN METABOLIC PANEL: CPT | Performed by: INTERNAL MEDICINE

## 2022-01-10 PROCEDURE — 25000003 PHARM REV CODE 250: Performed by: HOSPITALIST

## 2022-01-10 PROCEDURE — 86480 TB TEST CELL IMMUN MEASURE: CPT | Performed by: INTERNAL MEDICINE

## 2022-01-10 PROCEDURE — 85027 COMPLETE CBC AUTOMATED: CPT | Performed by: INTERNAL MEDICINE

## 2022-01-10 PROCEDURE — 27000249 HC VAPOTHERM CIRCUIT

## 2022-01-10 PROCEDURE — 27000221 HC OXYGEN, UP TO 24 HOURS

## 2022-01-10 PROCEDURE — 94799 UNLISTED PULMONARY SVC/PX: CPT

## 2022-01-10 PROCEDURE — 82728 ASSAY OF FERRITIN: CPT | Performed by: INTERNAL MEDICINE

## 2022-01-10 PROCEDURE — 94664 DEMO&/EVAL PT USE INHALER: CPT

## 2022-01-10 PROCEDURE — 27100092 HC HIGH FLOW DELIVERY CANNULA

## 2022-01-10 RX ORDER — MUPIROCIN 20 MG/G
OINTMENT TOPICAL 2 TIMES DAILY
Status: DISPENSED | OUTPATIENT
Start: 2022-01-10 | End: 2022-01-15

## 2022-01-10 RX ORDER — DEXMEDETOMIDINE HYDROCHLORIDE 4 UG/ML
0-1.4 INJECTION INTRAVENOUS CONTINUOUS
Status: DISCONTINUED | OUTPATIENT
Start: 2022-01-10 | End: 2022-01-13

## 2022-01-10 RX ADMIN — ALBUTEROL SULFATE 2 PUFF: 90 AEROSOL, METERED RESPIRATORY (INHALATION) at 07:01

## 2022-01-10 RX ADMIN — CETIRIZINE HYDROCHLORIDE 10 MG: 10 TABLET, FILM COATED ORAL at 09:01

## 2022-01-10 RX ADMIN — ATORVASTATIN CALCIUM 20 MG: 20 TABLET, FILM COATED ORAL at 09:01

## 2022-01-10 RX ADMIN — LEVETIRACETAM 500 MG: 500 TABLET, FILM COATED ORAL at 08:01

## 2022-01-10 RX ADMIN — ALBUTEROL SULFATE 2 PUFF: 90 AEROSOL, METERED RESPIRATORY (INHALATION) at 01:01

## 2022-01-10 RX ADMIN — ALBUTEROL SULFATE 2 PUFF: 90 AEROSOL, METERED RESPIRATORY (INHALATION) at 12:01

## 2022-01-10 RX ADMIN — ENOXAPARIN SODIUM 80 MG: 80 INJECTION SUBCUTANEOUS at 08:01

## 2022-01-10 RX ADMIN — GUAIFENESIN 600 MG: 600 TABLET, EXTENDED RELEASE ORAL at 09:01

## 2022-01-10 RX ADMIN — THERA TABS 1 TABLET: TAB at 09:01

## 2022-01-10 RX ADMIN — ENOXAPARIN SODIUM 80 MG: 80 INJECTION SUBCUTANEOUS at 09:01

## 2022-01-10 RX ADMIN — LEVETIRACETAM 500 MG: 500 TABLET, FILM COATED ORAL at 09:01

## 2022-01-10 RX ADMIN — DEXAMETHASONE 6 MG: 4 TABLET ORAL at 09:01

## 2022-01-10 RX ADMIN — DEXMEDETOMIDINE HYDROCHLORIDE 0.2 MCG/KG/HR: 4 INJECTION INTRAVENOUS at 06:01

## 2022-01-10 RX ADMIN — DEXMEDETOMIDINE HYDROCHLORIDE 0.4 MCG/KG/HR: 4 INJECTION INTRAVENOUS at 05:01

## 2022-01-10 RX ADMIN — DEXMEDETOMIDINE HYDROCHLORIDE 0 MCG/KG/HR: 4 INJECTION INTRAVENOUS at 05:01

## 2022-01-10 RX ADMIN — MUPIROCIN: 20 OINTMENT TOPICAL at 08:01

## 2022-01-10 RX ADMIN — OXYCODONE HYDROCHLORIDE AND ACETAMINOPHEN 500 MG: 500 TABLET ORAL at 09:01

## 2022-01-10 RX ADMIN — OXYCODONE HYDROCHLORIDE AND ACETAMINOPHEN 500 MG: 500 TABLET ORAL at 08:01

## 2022-01-10 RX ADMIN — GUAIFENESIN 600 MG: 600 TABLET, EXTENDED RELEASE ORAL at 08:01

## 2022-01-10 NOTE — EICU
New Patient Evaluation    56 M history of dyslipidemia and seizures. Confirmed COVID a week prior now comes in with desaturation 88%. Placed on supplemental oxygen now on NIV. He has been started on remdesevir and dexamethasone and given tocilizumab.    Patient seen resting, tolerating CPAP 8  /83  HR 80  O2 96%    · COVID pneumonia, repeat CXR pending. CRP trending down

## 2022-01-10 NOTE — PROGRESS NOTES
Progress Note  PULMONARY    Admit Date: 1/5/2022   01/10/2022      SUBJECTIVE:     1/8- worsening oxygenation, req 12L HFNC  1/9- on HFNC 10L this am, no fevers, VS stable  1/10- continuous cpap, sats improved, transferred to ICU for closer monitoring. Pt still denies any symptoms. Does not feel sob, cough, congestion or chest pain. Does not notice when his sats drop      OBJECTIVE:     Vitals (Most recent):  Vitals:    01/10/22 1220   BP:    Pulse: 62   Resp: (!) 21   Temp:        Vitals (24 hour range):  Temp:  [97.6 °F (36.4 °C)-98 °F (36.7 °C)]   Pulse:  []   Resp:  [16-45]   BP: ()/(65-88)   SpO2:  [91 %-98 %]       Intake/Output Summary (Last 24 hours) at 1/10/2022 1321  Last data filed at 1/10/2022 0701  Gross per 24 hour   Intake 220.76 ml   Output --   Net 220.76 ml          Physical Exam:  The patient's neuro status (alertness,orientation,cognitive function,motor skills,), pharyngeal exam (oral lesions, hygiene, abn dentition,), Neck (jvd,mass,thyroid,nodes in neck and above/below clavicle),RESPIRATORY(symmetry,effort,fremitus,percussion,auscultation),  Cor(rhythm,heart tones including gallops,perfusion,edema)ABD(distention,hepatic&splenomegaly,tenderness,masses), Skin(rash,cyanosis),Psyc(affect,judgement,).  Exam negative except for these pertinent findings:    No distress, on cpap  Scattered rales bilaterally  No edema     Radiographs reviewed: view by direct vision   CXR 1/10- mild progression bilateral infiltrates  CXR 1/4/22- very mild pnuemonitis bilat bases    Labs     Results for EIRN MARTINEZ JR. (MRN [id number]) as of 1/10/2022 13:21   Ref. Range 1/5/2022 07:24 1/7/2022 05:13 1/10/2022 11:15   Ferritin Latest Ref Range: 20.0 - 300.0 ng/mL 2,439 (H) 1,500 (H) 1,525 (H)     Recent Labs   Lab 01/10/22  1115   WBC 10.13   HGB 16.1   HCT 46.7        Recent Labs   Lab 01/10/22  1115      K 4.2      CO2 21*   BUN 21*   CREATININE 0.8   *   CALCIUM 9.4   AST 77*    *   ALKPHOS 109   BILITOT 0.8   PROT 7.1   ALBUMIN 3.2*   CRP 11.1*   No results for input(s): PH, PCO2, PO2, HCO3 in the last 24 hours.  Microbiology Results (last 7 days)     ** No results found for the last 168 hours. **          Impression:  Active Hospital Problems    Diagnosis  POA    *Pneumonia due to COVID-19 virus [U07.1, J12.82]  Yes    Acute respiratory failure with hypoxia [J96.01]  Yes    Seizures [R56.9]  Yes    Hyperlipidemia [E78.5]  Yes      Resolved Hospital Problems   No resolved problems to display.               Plan:     - continue CPAP with breaks - try vapotherm, keep sats >92%   - prone as tolerated  - precedex drip titrate as needed  - continue inhaled bronchodilators  - incentive spirometry   - completed RDV  - continue decadron 6mg daily  - received tocilizumab  - monitor inflammatory markers  - continue empiric therapeutic lovenox  - labs ordered today  - discussed w/ wife at bedside    The following were evaluated and adjusted as needed: sedation and neurologic status and acid base balance and oxygenation needs       Critical Care  - THE PATIENT HAS A HIGH PROBABILITY OF IMMINENT OR LIFE THREATENING DETERIORATION.  Over 50%time of encounter was in direct care at bedside.  Time was 30 to 74 minutes required for patient care.  Time needed for all of the above totaled 32 minutes.    Soila Choi MD  Pulmonary & Critical Care Medicine                              .

## 2022-01-10 NOTE — PROGRESS NOTES
[facility] Hospital Medicine  Progress Note    Patient Name: Stefano Donahue Jr.  MRN: [id number]  Patient Class: IP- Inpatient   Admission Date: 1/5/2022  Length of Stay: 5 days  Attending Physician: Estrella Lama MD  Primary Care Provider: Dario Ayon MD        Subjective:     Principal Problem:Pneumonia due to COVID-19 virus    HPI:  Stefano Donahue Jr. Is a 57 y/o male with a past medical history significant for HLD and seizures who presented to the ED for further evaluation of hypoxia.  He was diagnosed with COVID-19 one week ago.  His wife purchased a pulse ox in order to check the patient's oxygen saturation and today was noted to have a sat of 88%.  He was evaluated in the urgent care and thought to have pneumonia so he was sent to the ED.  During my interview, the patient's oxygen saturation was noted at 88-89% on room air and he was placed on supplemental oxygen.  Denies feeling SOB or chest pain, no nausea or vomiting.  He is placed in observation under the service of hospital medicine for continued medical management.     Overview/Hospital Course:  56-year-old male with history of hyperlipidemia and seizures admitted to the hospital medicine service with hypoxic respiratory failure secondary to COVID He was placed on supplemental oxygen, p.o. dexamethasone, Remdesivir and full-dose Lovenox.  Progressively patient required higher quantities of supplemental oxygen.  Patient received intravenous tocilizumab.  Pulmonary service was consulted.  Later on patient required transfer to intensive care unit for closer monitoring and use of noninvasive ventilatory therapy.      Interval History: Patient seen and examined.  Last evening patient transferred to intensive care unit for worsening oxygen sats and need for noninvasive ventilation.  Overnight patient has used CPAP therapy.  Wife at [facility]. Completed IV Remdesivir + Tocilizumab. On PO dexamethsone.     Review of Systems    Constitutional: Negative for chills and fever.   HENT: Negative for congestion and sore throat.    Respiratory: Positive for cough. Negative for shortness of breath.    Cardiovascular: Negative for chest pain and palpitations.   Gastrointestinal: Negative for abdominal pain, diarrhea, nausea and vomiting.   Genitourinary: Negative for flank pain and hematuria.   Musculoskeletal: Negative for neck pain and neck stiffness.   Skin: Negative for pallor and rash.   Neurological: Positive for headaches. Negative for weakness.   Psychiatric/Behavioral: Negative for agitation and confusion.   All other systems reviewed and are negative.    Objective:     Vital Signs (Most Recent):  Temp: 98 °F (36.7 °C) (01/10/22 0400)  Pulse: 63 (01/10/22 0830)  Resp: 17 (01/10/22 0719)  BP: 97/70 (01/10/22 0800)  SpO2: 97 % (01/10/22 0830) Vital Signs (24h Range):  Temp:  [97.6 °F (36.4 °C)-98.2 °F (36.8 °C)] 98 °F (36.7 °C)  Pulse:  [] 63  Resp:  [16-45] 17  SpO2:  [87 %-98 %] 97 %  BP: ()/(70-88) 97/70     Weight: 82.8 kg (182 lb 8.7 oz)  Body mass index is 26.96 kg/m².    Intake/Output Summary (Last 24 hours) at 1/10/2022 0952  Last data filed at 1/10/2022 0701  Gross per 24 hour   Intake 220.76 ml   Output --   Net 220.76 ml      Physical Exam  Constitutional:       General: He is not in acute distress.     Appearance: He is well-developed.   HENT:      Head: Normocephalic and atraumatic.   Eyes:      Conjunctiva/sclera: Conjunctivae normal.      Pupils: Pupils are equal, round, and reactive to light.   Cardiovascular:      Rate and Rhythm: Normal rate and regular rhythm.      Heart sounds: Normal heart sounds.   Pulmonary:      Effort: No tachypnea.      Breath sounds: Decreased breath sounds and rales (bibasilar, L>R) present.   Abdominal:      General: Bowel sounds are normal. There is no distension.      Palpations: Abdomen is soft.      Tenderness: There is no abdominal tenderness.   Musculoskeletal:          General: Normal range of motion.      Cervical back: Normal range of motion and neck supple.   Skin:     General: Skin is warm and dry.   Neurological:      Mental Status: He is alert and oriented to person, place, and time.   Psychiatric:         Behavior: Behavior normal.         Significant Labs:   Lab Results   Component Value Date    WBC 9.05 01/08/2022    HGB 15.1 01/08/2022    HCT 44.0 01/08/2022    MCV 86 01/08/2022     01/08/2022     CMP  Sodium   Date Value Ref Range Status   01/07/2022 138 136 - 145 mmol/L Final     Potassium   Date Value Ref Range Status   01/07/2022 4.3 3.5 - 5.1 mmol/L Final     Chloride   Date Value Ref Range Status   01/07/2022 102 95 - 110 mmol/L Final     CO2   Date Value Ref Range Status   01/07/2022 25 23 - 29 mmol/L Final     Glucose   Date Value Ref Range Status   01/07/2022 146 (H) 70 - 110 mg/dL Final     BUN   Date Value Ref Range Status   01/07/2022 22 (H) 6 - 20 mg/dL Final     Creatinine   Date Value Ref Range Status   01/07/2022 0.8 0.5 - 1.4 mg/dL Final     Calcium   Date Value Ref Range Status   01/07/2022 9.1 8.7 - 10.5 mg/dL Final     Total Protein   Date Value Ref Range Status   01/07/2022 7.0 6.0 - 8.4 g/dL Final     Albumin   Date Value Ref Range Status   01/07/2022 3.1 (L) 3.5 - 5.2 g/dL Final     Total Bilirubin   Date Value Ref Range Status   01/07/2022 0.5 0.1 - 1.0 mg/dL Final     Comment:     For infants and newborns, interpretation of results should be based  on gestational age, weight and in agreement with clinical  observations.    Premature Infant recommended reference ranges:  Up to 24 hours.............<8.0 mg/dL  Up to 48 hours............<12.0 mg/dL  3-5 days..................<15.0 mg/dL  6-29 days.................<15.0 mg/dL       Alkaline Phosphatase   Date Value Ref Range Status   01/07/2022 113 55 - 135 U/L Final     AST   Date Value Ref Range Status   01/07/2022 50 (H) 10 - 40 U/L Final     ALT   Date Value Ref Range Status   01/07/2022  54 (H) 10 - 44 U/L Final     Anion Gap   Date Value Ref Range Status   01/07/2022 11 8 - 16 mmol/L Final     eGFR if    Date Value Ref Range Status   01/07/2022 >60 >60 mL/min/1.73 m^2 Final     eGFR if non    Date Value Ref Range Status   01/07/2022 >60 >60 mL/min/1.73 m^2 Final     Comment:     Calculation used to obtain the estimated glomerular filtration  rate (eGFR) is the CKD-EPI equation.        Microbiology Results (last 7 days)     ** No results found for the last 168 hours. **        Significant Imaging:  CXR:   Patchy interstitial infiltrates in the mid and lower lung fields likely due to a viral pneumonitis.    CXR: Negative chest.  No appreciable infiltrate on this exam.    CXR: Mild bilateral airspace disease with distribution suspicious for edema, COVID or other atypical infection.       Assessment/Plan:      * Pneumonia due to COVID-19 virus  - COVID-19 testing   - Infection Control notified     - Isolation:   - Airborne, Contact and Droplet Precautions  - Cohort patients into COVID units  - N95 mask, wear eye protection  - 20 second hand hygiene              - Limit visitors per hospital policy              - Consolidating lab draws, nursing care, provider visits, and interventions    - Diagnostics: (leukopenia, hyponatremia, hyperferritinemia, elevated troponin, elevated d-dimer, age, and comorbidities are significant predictors of poor clinical outcome)  CBC, CMP, Procalcitonin, Ferritin, CRP, LDH, BNP, Troponin, ECG, Blood Culture x2, Portable CXR and UA and culture    - Management:  Supplemental O2 to maintain SpO2 >92%  Telemetry  Continuous/intermittent Pulse Ox  Albuterol treatment PRN  Completed Remdesivir + Tocilizumab.  Inflammation markers trending down.  Dexamethasone  FD lovenox per [facility] policy - patient counseled on risks and benefits and is agreeable.    Advance Care Planning   code status:  FULL code               Seizures  Chronic condition.   Maintain seizure/fall/aspiration precautions.  Continue chronic keppra.      Acute respiratory failure with hypoxia  Patient with Hypoxic Respiratory failure which is Acute.  he is not on home oxygen. Supplemental oxygen was provided and noted-  .   Signs/symptoms of respiratory failure include- tachypnea. Contributing diagnoses includes - COVID 19 Labs and images were reviewed. Patient Has not had a recent ABG. Will treat underlying causes and adjust management of respiratory failure as follows- continue supplemental oxygen as needed.    Hyperlipidemia   Patient is chronically on statin.will continue for now. Monitor clinically. Last LDL was No results found for: LDLCALC           VTE Risk Mitigation (From admission, onward)         Ordered     enoxaparin injection 80 mg  2 times daily         01/05/22 0626              Discharge Planning   NEL: 1/12/2022     Code Status: Full Code   Is the patient medically ready for discharge?:     Reason for patient still in hospital (select all that apply): Patient trending condition  Discharge Plan A: Home      Critical care time spent on the evaluation and treatment of severe organ dysfunction, review of pertinent labs and imaging studies, discussions with consulting providers and discussions with patient/family: 33 minutes.    Estrella Lama MD  Department of Hospital Medicine   [facility]

## 2022-01-10 NOTE — SUBJECTIVE & OBJECTIVE
Interval History: Patient seen and examined.  Last evening patient transferred to intensive care unit for worsening oxygen sats and need for noninvasive ventilation.  Overnight patient has used CPAP therapy.  Wife at bedisde. Completed IV Remdesivir + Tocilizumab. On PO dexamethsone.     Review of Systems   Constitutional: Negative for chills and fever.   HENT: Negative for congestion and sore throat.    Respiratory: Positive for cough. Negative for shortness of breath.    Cardiovascular: Negative for chest pain and palpitations.   Gastrointestinal: Negative for abdominal pain, diarrhea, nausea and vomiting.   Genitourinary: Negative for flank pain and hematuria.   Musculoskeletal: Negative for neck pain and neck stiffness.   Skin: Negative for pallor and rash.   Neurological: Positive for headaches. Negative for weakness.   Psychiatric/Behavioral: Negative for agitation and confusion.   All other systems reviewed and are negative.    Objective:     Vital Signs (Most Recent):  Temp: 98 °F (36.7 °C) (01/10/22 0400)  Pulse: 63 (01/10/22 0830)  Resp: 17 (01/10/22 0719)  BP: 97/70 (01/10/22 0800)  SpO2: 97 % (01/10/22 0830) Vital Signs (24h Range):  Temp:  [97.6 °F (36.4 °C)-98.2 °F (36.8 °C)] 98 °F (36.7 °C)  Pulse:  [] 63  Resp:  [16-45] 17  SpO2:  [87 %-98 %] 97 %  BP: ()/(70-88) 97/70     Weight: 82.8 kg (182 lb 8.7 oz)  Body mass index is 26.96 kg/m².    Intake/Output Summary (Last 24 hours) at 1/10/2022 0952  Last data filed at 1/10/2022 0701  Gross per 24 hour   Intake 220.76 ml   Output --   Net 220.76 ml      Physical Exam  Constitutional:       General: He is not in acute distress.     Appearance: He is well-developed.   HENT:      Head: Normocephalic and atraumatic.   Eyes:      Conjunctiva/sclera: Conjunctivae normal.      Pupils: Pupils are equal, round, and reactive to light.   Cardiovascular:      Rate and Rhythm: Normal rate and regular rhythm.      Heart sounds: Normal heart sounds.    Pulmonary:      Effort: No tachypnea.      Breath sounds: Decreased breath sounds and rales (bibasilar, L>R) present.   Abdominal:      General: Bowel sounds are normal. There is no distension.      Palpations: Abdomen is soft.      Tenderness: There is no abdominal tenderness.   Musculoskeletal:         General: Normal range of motion.      Cervical back: Normal range of motion and neck supple.   Skin:     General: Skin is warm and dry.   Neurological:      Mental Status: He is alert and oriented to person, place, and time.   Psychiatric:         Behavior: Behavior normal.         Significant Labs:   Lab Results   Component Value Date    WBC 9.05 01/08/2022    HGB 15.1 01/08/2022    HCT 44.0 01/08/2022    MCV 86 01/08/2022     01/08/2022     CMP  Sodium   Date Value Ref Range Status   01/07/2022 138 136 - 145 mmol/L Final     Potassium   Date Value Ref Range Status   01/07/2022 4.3 3.5 - 5.1 mmol/L Final     Chloride   Date Value Ref Range Status   01/07/2022 102 95 - 110 mmol/L Final     CO2   Date Value Ref Range Status   01/07/2022 25 23 - 29 mmol/L Final     Glucose   Date Value Ref Range Status   01/07/2022 146 (H) 70 - 110 mg/dL Final     BUN   Date Value Ref Range Status   01/07/2022 22 (H) 6 - 20 mg/dL Final     Creatinine   Date Value Ref Range Status   01/07/2022 0.8 0.5 - 1.4 mg/dL Final     Calcium   Date Value Ref Range Status   01/07/2022 9.1 8.7 - 10.5 mg/dL Final     Total Protein   Date Value Ref Range Status   01/07/2022 7.0 6.0 - 8.4 g/dL Final     Albumin   Date Value Ref Range Status   01/07/2022 3.1 (L) 3.5 - 5.2 g/dL Final     Total Bilirubin   Date Value Ref Range Status   01/07/2022 0.5 0.1 - 1.0 mg/dL Final     Comment:     For infants and newborns, interpretation of results should be based  on gestational age, weight and in agreement with clinical  observations.    Premature Infant recommended reference ranges:  Up to 24 hours.............<8.0 mg/dL  Up to 48  hours............<12.0 mg/dL  3-5 days..................<15.0 mg/dL  6-29 days.................<15.0 mg/dL       Alkaline Phosphatase   Date Value Ref Range Status   01/07/2022 113 55 - 135 U/L Final     AST   Date Value Ref Range Status   01/07/2022 50 (H) 10 - 40 U/L Final     ALT   Date Value Ref Range Status   01/07/2022 54 (H) 10 - 44 U/L Final     Anion Gap   Date Value Ref Range Status   01/07/2022 11 8 - 16 mmol/L Final     eGFR if    Date Value Ref Range Status   01/07/2022 >60 >60 mL/min/1.73 m^2 Final     eGFR if non    Date Value Ref Range Status   01/07/2022 >60 >60 mL/min/1.73 m^2 Final     Comment:     Calculation used to obtain the estimated glomerular filtration  rate (eGFR) is the CKD-EPI equation.        Microbiology Results (last 7 days)     ** No results found for the last 168 hours. **        Significant Imaging:  CXR:   Patchy interstitial infiltrates in the mid and lower lung fields likely due to a viral pneumonitis.    CXR: Negative chest.  No appreciable infiltrate on this exam.    CXR: Mild bilateral airspace disease with distribution suspicious for edema, COVID or other atypical infection.

## 2022-01-10 NOTE — NURSING
Resumed care from PETEY Almanza, no changes in previous documented assessment, VSS, will continue to monitor.

## 2022-01-10 NOTE — NURSING
Notified by charge nurse Pat that pt to be sent to ICU.  Charge nurse Iliana stated that she spoke with MD.  Charge nurse Iliana stated she would discuss situation with pt and family.  Called house supervisor to obtain ICU room number.  Pt transferred to room 504.  Bedside report given to Simon.  Pt belongings split between pt and wife.

## 2022-01-10 NOTE — PLAN OF CARE
Problem: Adult Inpatient Plan of Care  Goal: Plan of Care Review  Outcome: Ongoing, Progressing  Goal: Patient-Specific Goal (Individualized)  Outcome: Ongoing, Progressing  Goal: Optimal Comfort and Wellbeing  Outcome: Ongoing, Progressing  Goal: Readiness for Transition of Care  Outcome: Ongoing, Progressing     Problem: Fall Injury Risk  Goal: Absence of Fall and Fall-Related Injury  Outcome: Ongoing, Progressing     Problem: Oral Intake Inadequate  Goal: Improved Oral Intake  Outcome: Ongoing, Progressing     Problem: Skin Injury Risk Increased  Goal: Skin Health and Integrity  Outcome: Ongoing, Progressing       Pt AAOx4 on continuous CPAP. Pt transferred from 3rd floor around 2130. Pt educated on reason for transfer and reason for continuous CPAP. Pt given CHG bath upon arrival on unit. Pt VVS. Urinal within reach of patient. Pt anxious at times but is able to follow commands. Pt started on Precedex at 0.3 to help ease anxiety and to help compliance of CPAP. All questions and concerns addressed and answered. Pt afebrile. See flow sheets for further assessment and vital sign data. Bed alarm on, call light in reach, and bed in lowest position with wheels locked.

## 2022-01-10 NOTE — PLAN OF CARE
Patient transferred to ICU for oxygen requirements. Patient was on 15L non re-breather, now on continuous CPAP. Discharge plan TBD.       01/10/22 1305   Discharge Reassessment   Assessment Type Discharge Planning Reassessment   Discharge Plan A Long-term acute care facility (LTAC)   Discharge Plan B Long-term acute care facility (LTAC)

## 2022-01-10 NOTE — CARE UPDATE
"   01/10/22 0719   Patient Assessment/Suction   Level of Consciousness (AVPU) alert   All Lung Fields Breath Sounds diminished   Cough Frequency infrequent   Cough Type good;dry;nonproductive   PRE-TX-O2   O2 Device (Oxygen Therapy) CPAP   $ Is the patient on Low Flow Oxygen? Yes   Oxygen Concentration (%) 65   SpO2 96 %   Pulse Oximetry Type Continuous   $ Pulse Oximetry - Multiple Charge Pulse Oximetry - Multiple   Pulse 79   Resp 17   Inhaler   $ Inhaler Charges MDI (Metered Dose Inahler) Treatment   Respiratory Treatment Status (Inhaler) given   Treatment Route (Inhaler) in-line   Patient Position (Inhaler) Fernandez's   Signs of Intolerance (Inhaler) none   Incentive Spirometer   $ Incentive Spirometer Charges done with encouragement   Administration (IS) mouthpiece utilized   Number of Repetitions (IS) 10   Level Incentive Spirometer (mL) 2000   Patient Tolerance (IS) good   Vibratory PEP Therapy   $ Vibratory PEP Charges Aerobika Therapy   $ Vibratory PEP Tech Time Charges 15 min   Type (PEP Therapy) vibratory/oscillatory   Device (PEP Therapy) flutter   Route (PEP Therapy) mouthpiece   Breaths per Cycle (PEP Therapy) 10   Cycles (PEP Therapy) 1   Skin Integrity   $ Wound Care Tech Time 15 min   Area Observed Cheek;Bridge of nose   Skin Appearance without discoloration   Barrier used? Liquid Filled Cushion   Ready to Wean/Extubation Screen   FIO2<=50 (chart decimal) (!) 0.65   Preset CPAP/BiPAP Settings   Mode Of Delivery CPAP   $ Initial CPAP/BiPAP Setup? No   $ Is patient using? Yes   CPAP (cm H2O) 8   Patient CPAP/BiPAP Settings   RR Total (Breaths/Min) 17   Tidal Volume (mL) 736   VE Minute Ventilation (L/min) 14.6 L/min   Peak Inspiratory Pressure (cm H2O) 8   TiTOT (%) 32   Total Leak (L/Min) 20   Patient Trigger - ST Mode Only (%) 100     Pt requesting to come off of CPAP. Placed pt on HFNC @ 15L. Pt did well with his IS & aerobika. However, O2 sats dropped to 87%. Placed pt on CPAP. It did take about 10" "  for him to recover.Explained to pt that it is necessary for him to wear the CPAP continuously, for now.

## 2022-01-11 LAB
ALBUMIN SERPL BCP-MCNC: 3 G/DL (ref 3.5–5.2)
ALP SERPL-CCNC: 102 U/L (ref 55–135)
ALT SERPL W/O P-5'-P-CCNC: 138 U/L (ref 10–44)
ANION GAP SERPL CALC-SCNC: 13 MMOL/L (ref 8–16)
AST SERPL-CCNC: 70 U/L (ref 10–40)
BASOPHILS # BLD AUTO: 0.01 K/UL (ref 0–0.2)
BASOPHILS NFR BLD: 0.1 % (ref 0–1.9)
BILIRUB SERPL-MCNC: 0.8 MG/DL (ref 0.1–1)
BUN SERPL-MCNC: 23 MG/DL (ref 6–20)
CALCIUM SERPL-MCNC: 9.1 MG/DL (ref 8.7–10.5)
CHLORIDE SERPL-SCNC: 101 MMOL/L (ref 95–110)
CO2 SERPL-SCNC: 21 MMOL/L (ref 23–29)
CREAT SERPL-MCNC: 0.8 MG/DL (ref 0.5–1.4)
CRP SERPL-MCNC: 5.2 MG/L (ref 0–8.2)
D DIMER PPP IA.FEU-MCNC: 0.84 MG/L FEU
DIFFERENTIAL METHOD: ABNORMAL
EOSINOPHIL # BLD AUTO: 0.1 K/UL (ref 0–0.5)
EOSINOPHIL NFR BLD: 1.6 % (ref 0–8)
ERYTHROCYTE [DISTWIDTH] IN BLOOD BY AUTOMATED COUNT: 11.8 % (ref 11.5–14.5)
EST. GFR  (AFRICAN AMERICAN): >60 ML/MIN/1.73 M^2
EST. GFR  (NON AFRICAN AMERICAN): >60 ML/MIN/1.73 M^2
GLUCOSE SERPL-MCNC: 112 MG/DL (ref 70–110)
HCT VFR BLD AUTO: 46 % (ref 40–54)
HGB BLD-MCNC: 15.8 G/DL (ref 14–18)
IMM GRANULOCYTES # BLD AUTO: 0.12 K/UL (ref 0–0.04)
IMM GRANULOCYTES NFR BLD AUTO: 1.7 % (ref 0–0.5)
LYMPHOCYTES # BLD AUTO: 0.9 K/UL (ref 1–4.8)
LYMPHOCYTES NFR BLD: 12.2 % (ref 18–48)
MCH RBC QN AUTO: 29.4 PG (ref 27–31)
MCHC RBC AUTO-ENTMCNC: 34.3 G/DL (ref 32–36)
MCV RBC AUTO: 86 FL (ref 82–98)
MONOCYTES # BLD AUTO: 0.3 K/UL (ref 0.3–1)
MONOCYTES NFR BLD: 3.6 % (ref 4–15)
NEUTROPHILS # BLD AUTO: 5.6 K/UL (ref 1.8–7.7)
NEUTROPHILS NFR BLD: 80.8 % (ref 38–73)
NRBC BLD-RTO: 0 /100 WBC
PLATELET # BLD AUTO: 447 K/UL (ref 150–450)
PMV BLD AUTO: 9 FL (ref 9.2–12.9)
POTASSIUM SERPL-SCNC: 3.9 MMOL/L (ref 3.5–5.1)
PROT SERPL-MCNC: 6.8 G/DL (ref 6–8.4)
RBC # BLD AUTO: 5.38 M/UL (ref 4.6–6.2)
SODIUM SERPL-SCNC: 135 MMOL/L (ref 136–145)
WBC # BLD AUTO: 6.95 K/UL (ref 3.9–12.7)

## 2022-01-11 PROCEDURE — 85025 COMPLETE CBC W/AUTO DIFF WBC: CPT | Performed by: INTERNAL MEDICINE

## 2022-01-11 PROCEDURE — 85379 FIBRIN DEGRADATION QUANT: CPT | Performed by: HOSPITALIST

## 2022-01-11 PROCEDURE — 25000003 PHARM REV CODE 250: Performed by: NURSE PRACTITIONER

## 2022-01-11 PROCEDURE — 99233 SBSQ HOSP IP/OBS HIGH 50: CPT | Mod: ,,, | Performed by: INTERNAL MEDICINE

## 2022-01-11 PROCEDURE — 25000003 PHARM REV CODE 250: Performed by: INTERNAL MEDICINE

## 2022-01-11 PROCEDURE — 99233 PR SUBSEQUENT HOSPITAL CARE,LEVL III: ICD-10-PCS | Mod: ,,, | Performed by: INTERNAL MEDICINE

## 2022-01-11 PROCEDURE — 86140 C-REACTIVE PROTEIN: CPT | Performed by: HOSPITALIST

## 2022-01-11 PROCEDURE — 27100171 HC OXYGEN HIGH FLOW UP TO 24 HOURS

## 2022-01-11 PROCEDURE — 94799 UNLISTED PULMONARY SVC/PX: CPT

## 2022-01-11 PROCEDURE — 27000207 HC ISOLATION

## 2022-01-11 PROCEDURE — 94664 DEMO&/EVAL PT USE INHALER: CPT

## 2022-01-11 PROCEDURE — 20000000 HC ICU ROOM

## 2022-01-11 PROCEDURE — C9399 UNCLASSIFIED DRUGS OR BIOLOG: HCPCS | Performed by: NURSE PRACTITIONER

## 2022-01-11 PROCEDURE — 25000242 PHARM REV CODE 250 ALT 637 W/ HCPCS: Performed by: NURSE PRACTITIONER

## 2022-01-11 PROCEDURE — 25000242 PHARM REV CODE 250 ALT 637 W/ HCPCS: Performed by: INTERNAL MEDICINE

## 2022-01-11 PROCEDURE — 94640 AIRWAY INHALATION TREATMENT: CPT

## 2022-01-11 PROCEDURE — 80053 COMPREHEN METABOLIC PANEL: CPT | Performed by: INTERNAL MEDICINE

## 2022-01-11 PROCEDURE — 25000003 PHARM REV CODE 250: Performed by: HOSPITALIST

## 2022-01-11 PROCEDURE — 36415 COLL VENOUS BLD VENIPUNCTURE: CPT | Performed by: HOSPITALIST

## 2022-01-11 PROCEDURE — 36415 COLL VENOUS BLD VENIPUNCTURE: CPT | Performed by: INTERNAL MEDICINE

## 2022-01-11 PROCEDURE — 94660 CPAP INITIATION&MGMT: CPT

## 2022-01-11 PROCEDURE — 94761 N-INVAS EAR/PLS OXIMETRY MLT: CPT

## 2022-01-11 PROCEDURE — 63600175 PHARM REV CODE 636 W HCPCS: Performed by: NURSE PRACTITIONER

## 2022-01-11 PROCEDURE — 99900035 HC TECH TIME PER 15 MIN (STAT)

## 2022-01-11 RX ORDER — IPRATROPIUM BROMIDE AND ALBUTEROL SULFATE 2.5; .5 MG/3ML; MG/3ML
3 SOLUTION RESPIRATORY (INHALATION) EVERY 6 HOURS
Status: DISCONTINUED | OUTPATIENT
Start: 2022-01-11 | End: 2022-01-17 | Stop reason: HOSPADM

## 2022-01-11 RX ADMIN — ENOXAPARIN SODIUM 80 MG: 80 INJECTION SUBCUTANEOUS at 08:01

## 2022-01-11 RX ADMIN — IPRATROPIUM BROMIDE AND ALBUTEROL SULFATE 3 ML: 2.5; .5 SOLUTION RESPIRATORY (INHALATION) at 12:01

## 2022-01-11 RX ADMIN — FLUTICASONE PROPIONATE 100 MCG: 50 SPRAY, METERED NASAL at 08:01

## 2022-01-11 RX ADMIN — THERA TABS 1 TABLET: TAB at 08:01

## 2022-01-11 RX ADMIN — LEVETIRACETAM 500 MG: 500 TABLET, FILM COATED ORAL at 08:01

## 2022-01-11 RX ADMIN — MUPIROCIN: 20 OINTMENT TOPICAL at 08:01

## 2022-01-11 RX ADMIN — ALBUTEROL SULFATE 2 PUFF: 90 AEROSOL, METERED RESPIRATORY (INHALATION) at 12:01

## 2022-01-11 RX ADMIN — CETIRIZINE HYDROCHLORIDE 10 MG: 10 TABLET, FILM COATED ORAL at 08:01

## 2022-01-11 RX ADMIN — IPRATROPIUM BROMIDE AND ALBUTEROL SULFATE 3 ML: 2.5; .5 SOLUTION RESPIRATORY (INHALATION) at 07:01

## 2022-01-11 RX ADMIN — DEXMEDETOMIDINE HYDROCHLORIDE 0.3 MCG/KG/HR: 4 INJECTION INTRAVENOUS at 09:01

## 2022-01-11 RX ADMIN — OXYCODONE HYDROCHLORIDE AND ACETAMINOPHEN 500 MG: 500 TABLET ORAL at 08:01

## 2022-01-11 RX ADMIN — GUAIFENESIN 600 MG: 600 TABLET, EXTENDED RELEASE ORAL at 08:01

## 2022-01-11 RX ADMIN — DEXAMETHASONE 6 MG: 4 TABLET ORAL at 09:01

## 2022-01-11 RX ADMIN — ALBUTEROL SULFATE 2 PUFF: 90 AEROSOL, METERED RESPIRATORY (INHALATION) at 07:01

## 2022-01-11 RX ADMIN — ATORVASTATIN CALCIUM 20 MG: 20 TABLET, FILM COATED ORAL at 08:01

## 2022-01-11 NOTE — PLAN OF CARE
Patient is alert and oriented x4, vital signs stable except oxygen saturation where he had an episode of desaturation to high 80's and warranted increase of Hiflo to 80% oxygen, denies any pain or distress, educated on breathing exercise for anxiety, needs assessed, safety sweep done, swallowed pills whole with sips of water, will continue to monitor.

## 2022-01-11 NOTE — PROGRESS NOTES
[facility] Hospital Medicine  Progress Note    Patient Name: Stefano Donahue Jr.  MRN: [id number]  Patient Class: IP- Inpatient   Admission Date: 1/5/2022  Length of Stay: 6 days  Attending Physician: Estrella Lama MD  Primary Care Provider: Dario Ayon MD        Subjective:     Principal Problem:Pneumonia due to COVID-19 virus        HPI:  Stefano Donahue Jr. Is a 57 y/o male with a past medical history significant for HLD and seizures who presented to the ED for further evaluation of hypoxia.  He was diagnosed with COVID-19 one week ago.  His wife purchased a pulse ox in order to check the patient's oxygen saturation and today was noted to have a sat of 88%.  He was evaluated in the urgent care and thought to have pneumonia so he was sent to the ED.  During my interview, the patient's oxygen saturation was noted at 88-89% on room air and he was placed on supplemental oxygen.  Denies feeling SOB or chest pain, no nausea or vomiting.  He is placed in observation under the service of hospital medicine for continued medical management.         Overview/Hospital Course:  56-year-old male with history of hyperlipidemia and seizures admitted to the hospital medicine service with hypoxic respiratory failure secondary to COVID He was placed on supplemental oxygen, p.o. dexamethasone, Remdesivir and full-dose Lovenox.  Progressively patient required higher quantities of supplemental oxygen.  Patient received intravenous tocilizumab.  Pulmonary service was consulted.  Later on patient required transfer to intensive care unit for closer monitoring and use of noninvasive ventilatory therapy.      Interval History: Patient seen and examined.  Appears comfortable with Vapotherm use.  Overnight patient has used CPAP therapy.  [facility]. Completed IV Remdesivir + Tocilizumab. On PO dexamethsone.     Review of Systems   Constitutional: Negative for chills and fever.   HENT: Negative for congestion and  sore throat.    Respiratory: Positive for cough. Negative for shortness of breath.    Cardiovascular: Negative for chest pain and palpitations.   Gastrointestinal: Negative for abdominal pain, diarrhea, nausea and vomiting.   Genitourinary: Negative for flank pain and hematuria.   Musculoskeletal: Negative for neck pain and neck stiffness.   Skin: Negative for pallor and rash.   Neurological: Positive for headaches. Negative for weakness.   Psychiatric/Behavioral: Negative for agitation and confusion.   All other systems reviewed and are negative.    Objective:     Vital Signs (Most Recent):  Temp: 97.7 °F (36.5 °C) (01/11/22 0430)  Pulse: 75 (01/11/22 0900)  Resp: (!) 0 (01/11/22 0900)  BP: 106/73 (01/11/22 0900)  SpO2: (!) 88 % (01/11/22 0900) Vital Signs (24h Range):  Temp:  [97.7 °F (36.5 °C)-97.8 °F (36.6 °C)] 97.7 °F (36.5 °C)  Pulse:  [57-82] 75  Resp:  [0-24] 0  SpO2:  [88 %-100 %] 88 %  BP: ()/(59-81) 106/73     Weight: 82.8 kg (182 lb 8.7 oz)  Body mass index is 26.96 kg/m².    Intake/Output Summary (Last 24 hours) at 1/11/2022 1010  Last data filed at 1/11/2022 0700  Gross per 24 hour   Intake 111.73 ml   Output 425 ml   Net -313.27 ml      Physical Exam  Constitutional:       General: He is not in acute distress.     Appearance: He is well-developed.   HENT:      Head: Normocephalic and atraumatic.   Eyes:      Conjunctiva/sclera: Conjunctivae normal.      Pupils: Pupils are equal, round, and reactive to light.   Cardiovascular:      Rate and Rhythm: Normal rate and regular rhythm.      Heart sounds: Normal heart sounds.   Pulmonary:      Effort: No tachypnea.      Breath sounds: Decreased breath sounds and rales (bibasilar, L>R) present.   Abdominal:      General: Bowel sounds are normal. There is no distension.      Palpations: Abdomen is soft.      Tenderness: There is no abdominal tenderness.   Musculoskeletal:         General: Normal range of motion.      Cervical back: Normal range of motion  and neck supple.   Skin:     General: Skin is warm and dry.   Neurological:      Mental Status: He is alert and oriented to person, place, and time.   Psychiatric:         Behavior: Behavior normal.         Significant Labs:   Lab Results   Component Value Date    WBC 6.95 01/11/2022    HGB 15.8 01/11/2022    HCT 46.0 01/11/2022    MCV 86 01/11/2022     01/11/2022     CMP  Sodium   Date Value Ref Range Status   01/11/2022 135 (L) 136 - 145 mmol/L Final     Potassium   Date Value Ref Range Status   01/11/2022 3.9 3.5 - 5.1 mmol/L Final     Chloride   Date Value Ref Range Status   01/11/2022 101 95 - 110 mmol/L Final     CO2   Date Value Ref Range Status   01/11/2022 21 (L) 23 - 29 mmol/L Final     Glucose   Date Value Ref Range Status   01/11/2022 112 (H) 70 - 110 mg/dL Final     BUN   Date Value Ref Range Status   01/11/2022 23 (H) 6 - 20 mg/dL Final     Creatinine   Date Value Ref Range Status   01/11/2022 0.8 0.5 - 1.4 mg/dL Final     Calcium   Date Value Ref Range Status   01/11/2022 9.1 8.7 - 10.5 mg/dL Final     Total Protein   Date Value Ref Range Status   01/11/2022 6.8 6.0 - 8.4 g/dL Final     Albumin   Date Value Ref Range Status   01/11/2022 3.0 (L) 3.5 - 5.2 g/dL Final     Total Bilirubin   Date Value Ref Range Status   01/11/2022 0.8 0.1 - 1.0 mg/dL Final     Comment:     For infants and newborns, interpretation of results should be based  on gestational age, weight and in agreement with clinical  observations.    Premature Infant recommended reference ranges:  Up to 24 hours.............<8.0 mg/dL  Up to 48 hours............<12.0 mg/dL  3-5 days..................<15.0 mg/dL  6-29 days.................<15.0 mg/dL       Alkaline Phosphatase   Date Value Ref Range Status   01/11/2022 102 55 - 135 U/L Final     AST   Date Value Ref Range Status   01/11/2022 70 (H) 10 - 40 U/L Final     ALT   Date Value Ref Range Status   01/11/2022 138 (H) 10 - 44 U/L Final     Anion Gap   Date Value Ref Range Status    01/11/2022 13 8 - 16 mmol/L Final     eGFR if    Date Value Ref Range Status   01/11/2022 >60 >60 mL/min/1.73 m^2 Final     eGFR if non    Date Value Ref Range Status   01/11/2022 >60 >60 mL/min/1.73 m^2 Final     Comment:     Calculation used to obtain the estimated glomerular filtration  rate (eGFR) is the CKD-EPI equation.        Microbiology Results (last 7 days)     ** No results found for the last 168 hours. **        Significant Imaging:  CXR:   Patchy interstitial infiltrates in the mid and lower lung fields likely due to a viral pneumonitis.    CXR: Negative chest.  No appreciable infiltrate on this exam.    CXR: Mild bilateral airspace disease with distribution suspicious for edema, COVID or other atypical infection.         Assessment/Plan:      * Pneumonia due to COVID-19 virus  - COVID-19 testing   - Infection Control notified     - Isolation:   - Airborne, Contact and Droplet Precautions  - Cohort patients into COVID units  - N95 mask, wear eye protection  - 20 second hand hygiene              - Limit visitors per hospital policy              - Consolidating lab draws, nursing care, provider visits, and interventions    - Diagnostics: (leukopenia, hyponatremia, hyperferritinemia, elevated troponin, elevated d-dimer, age, and comorbidities are significant predictors of poor clinical outcome)  CBC, CMP, Procalcitonin, Ferritin, CRP, LDH, BNP, Troponin, ECG, Blood Culture x2, Portable CXR and UA and culture    - Management:  Supplemental O2 to maintain SpO2 >92%  Telemetry  Continuous/intermittent Pulse Ox  Albuterol treatment PRN  Completed Remdesivir + Tocilizumab.  Inflammation markers trending down.  Dexamethasone  FD lovenox per [facility] policy - patient counseled on risks and benefits and is agreeable.    Advance Care Planning   code status:  FULL code               Seizures  Chronic condition.  Maintain seizure/fall/aspiration precautions.  Continue chronic  keppra.      Acute respiratory failure with hypoxia  Patient with Hypoxic Respiratory failure which is Acute.  he is not on home oxygen. Supplemental oxygen was provided and noted-  .   Signs/symptoms of respiratory failure include- tachypnea. Contributing diagnoses includes - COVID 19 Labs and images were reviewed. Patient Has not had a recent ABG. Will treat underlying causes and adjust management of respiratory failure as follows- continue supplemental oxygen as needed.    Hyperlipidemia   Patient is chronically on statin.will continue for now. Monitor clinically. Last LDL was No results found for: LDLCALC           VTE Risk Mitigation (From admission, onward)         Ordered     enoxaparin injection 80 mg  2 times daily         01/05/22 0626                Discharge Planning   NEL: TBD      Code Status: Full Code   Is the patient medically ready for discharge?:     Reason for patient still in hospital (select all that apply): Patient trending condition  Discharge Plan A: Long-term acute care facility (LTAC)            Critical care time spent on the evaluation and treatment of severe organ dysfunction, review of pertinent labs and imaging studies, discussions with consulting providers and discussions with patient/family: 33 minutes.      Estrella Lama MD  Department of Hospital Medicine   [facility]

## 2022-01-11 NOTE — PROGRESS NOTES
Progress Note  PULMONARY    Admit Date: 1/5/2022 01/11/2022      SUBJECTIVE:     1/8- worsening oxygenation, req 12L HFNC  1/9- on HFNC 10L this am, no fevers, VS stable  1/10- continuous cpap, sats improved, transferred to ICU for closer monitoring. Pt still denies any symptoms. Does not feel sob, cough, congestion or chest pain. Does not notice when his sats drop  1/11- on vapotherm, no new complaints      OBJECTIVE:     Vitals (Most recent):  Vitals:    01/11/22 0740   BP:    Pulse: 74   Resp: (!) 24   Temp:        Vitals (24 hour range):  Temp:  [97.7 °F (36.5 °C)-97.8 °F (36.6 °C)]   Pulse:  [57-82]   Resp:  [20-24]   BP: ()/(59-81)   SpO2:  [89 %-100 %]       Intake/Output Summary (Last 24 hours) at 1/11/2022 0815  Last data filed at 1/11/2022 0700  Gross per 24 hour   Intake 111.73 ml   Output 425 ml   Net -313.27 ml          Physical Exam:  The patient's neuro status (alertness,orientation,cognitive function,motor skills,), pharyngeal exam (oral lesions, hygiene, abn dentition,), Neck (jvd,mass,thyroid,nodes in neck and above/below clavicle),RESPIRATORY(symmetry,effort,fremitus,percussion,auscultation),  Cor(rhythm,heart tones including gallops,perfusion,edema)ABD(distention,hepatic&splenomegaly,tenderness,masses), Skin(rash,cyanosis),Psyc(affect,judgement,).  Exam negative except for these pertinent findings:    No distress, awake and alert  Scattered rales bilaterally  No edema     Radiographs reviewed: view by direct vision   CXR 1/10- mild progression bilateral infiltrates  CXR 1/4/22- very mild pnuemonitis bilat bases    Labs     Results for ERIN MARTINEZ JR. (MRN [id number]) as of 1/10/2022 13:21   Ref. Range 1/5/2022 07:24 1/7/2022 05:13 1/10/2022 11:15   Ferritin Latest Ref Range: 20.0 - 300.0 ng/mL 2,439 (H) 1,500 (H) 1,525 (H)     Recent Labs   Lab 01/11/22  0400   WBC 6.95   HGB 15.8   HCT 46.0        Recent Labs   Lab 01/10/22  1115 01/10/22  1115 01/11/22  0400      < > 135*    K 4.2   < > 3.9      < > 101   CO2 21*   < > 21*   BUN 21*   < > 23*   CREATININE 0.8   < > 0.8   *   < > 112*   CALCIUM 9.4   < > 9.1   AST 77*   < > 70*   *   < > 138*   ALKPHOS 109   < > 102   BILITOT 0.8   < > 0.8   PROT 7.1   < > 6.8   ALBUMIN 3.2*   < > 3.0*   CRP 11.1*  --   --     < > = values in this interval not displayed.   No results for input(s): PH, PCO2, PO2, HCO3 in the last 24 hours.  Microbiology Results (last 7 days)     ** No results found for the last 168 hours. **          Impression:  Active Hospital Problems    Diagnosis  POA    *Pneumonia due to COVID-19 virus [U07.1, J12.82]  Yes    Acute respiratory failure with hypoxia [J96.01]  Yes    Seizures [R56.9]  Yes    Hyperlipidemia [E78.5]  Yes      Resolved Hospital Problems   No resolved problems to display.               Plan:     - continue supplemental O2 to keep sats >92%. cpap if needed   - precedex drip titrate as needed  - continue inhaled bronchodilators  - incentive spirometry   - completed RDV  - continue decadron 6mg daily  - received tocilizumab  - monitor inflammatory markers  - continue empiric therapeutic lovenox  - needs to prone  - up in chair        Soila Choi MD  Pulmonary & Critical Care Medicine                              .

## 2022-01-11 NOTE — SUBJECTIVE & OBJECTIVE
Interval History: Patient seen and examined.  Appears comfortable with Vapotherm use.  Overnight patient has used CPAP therapy.  Wife at [facility]. Completed IV Remdesivir + Tocilizumab. On PO dexamethsone.     Review of Systems   Constitutional: Negative for chills and fever.   HENT: Negative for congestion and sore throat.    Respiratory: Positive for cough. Negative for shortness of breath.    Cardiovascular: Negative for chest pain and palpitations.   Gastrointestinal: Negative for abdominal pain, diarrhea, nausea and vomiting.   Genitourinary: Negative for flank pain and hematuria.   Musculoskeletal: Negative for neck pain and neck stiffness.   Skin: Negative for pallor and rash.   Neurological: Positive for headaches. Negative for weakness.   Psychiatric/Behavioral: Negative for agitation and confusion.   All other systems reviewed and are negative.    Objective:     Vital Signs (Most Recent):  Temp: 97.7 °F (36.5 °C) (01/11/22 0430)  Pulse: 75 (01/11/22 0900)  Resp: (!) 0 (01/11/22 0900)  BP: 106/73 (01/11/22 0900)  SpO2: (!) 88 % (01/11/22 0900) Vital Signs (24h Range):  Temp:  [97.7 °F (36.5 °C)-97.8 °F (36.6 °C)] 97.7 °F (36.5 °C)  Pulse:  [57-82] 75  Resp:  [0-24] 0  SpO2:  [88 %-100 %] 88 %  BP: ()/(59-81) 106/73     Weight: 82.8 kg (182 lb 8.7 oz)  Body mass index is 26.96 kg/m².    Intake/Output Summary (Last 24 hours) at 1/11/2022 1010  Last data filed at 1/11/2022 0700  Gross per 24 hour   Intake 111.73 ml   Output 425 ml   Net -313.27 ml      Physical Exam  Constitutional:       General: He is not in acute distress.     Appearance: He is well-developed.   HENT:      Head: Normocephalic and atraumatic.   Eyes:      Conjunctiva/sclera: Conjunctivae normal.      Pupils: Pupils are equal, round, and reactive to light.   Cardiovascular:      Rate and Rhythm: Normal rate and regular rhythm.      Heart sounds: Normal heart sounds.   Pulmonary:      Effort: No tachypnea.      Breath sounds: Decreased  breath sounds and rales (bibasilar, L>R) present.   Abdominal:      General: Bowel sounds are normal. There is no distension.      Palpations: Abdomen is soft.      Tenderness: There is no abdominal tenderness.   Musculoskeletal:         General: Normal range of motion.      Cervical back: Normal range of motion and neck supple.   Skin:     General: Skin is warm and dry.   Neurological:      Mental Status: He is alert and oriented to person, place, and time.   Psychiatric:         Behavior: Behavior normal.         Significant Labs:   Lab Results   Component Value Date    WBC 6.95 01/11/2022    HGB 15.8 01/11/2022    HCT 46.0 01/11/2022    MCV 86 01/11/2022     01/11/2022     CMP  Sodium   Date Value Ref Range Status   01/11/2022 135 (L) 136 - 145 mmol/L Final     Potassium   Date Value Ref Range Status   01/11/2022 3.9 3.5 - 5.1 mmol/L Final     Chloride   Date Value Ref Range Status   01/11/2022 101 95 - 110 mmol/L Final     CO2   Date Value Ref Range Status   01/11/2022 21 (L) 23 - 29 mmol/L Final     Glucose   Date Value Ref Range Status   01/11/2022 112 (H) 70 - 110 mg/dL Final     BUN   Date Value Ref Range Status   01/11/2022 23 (H) 6 - 20 mg/dL Final     Creatinine   Date Value Ref Range Status   01/11/2022 0.8 0.5 - 1.4 mg/dL Final     Calcium   Date Value Ref Range Status   01/11/2022 9.1 8.7 - 10.5 mg/dL Final     Total Protein   Date Value Ref Range Status   01/11/2022 6.8 6.0 - 8.4 g/dL Final     Albumin   Date Value Ref Range Status   01/11/2022 3.0 (L) 3.5 - 5.2 g/dL Final     Total Bilirubin   Date Value Ref Range Status   01/11/2022 0.8 0.1 - 1.0 mg/dL Final     Comment:     For infants and newborns, interpretation of results should be based  on gestational age, weight and in agreement with clinical  observations.    Premature Infant recommended reference ranges:  Up to 24 hours.............<8.0 mg/dL  Up to 48 hours............<12.0 mg/dL  3-5 days..................<15.0 mg/dL  6-29  days.................<15.0 mg/dL       Alkaline Phosphatase   Date Value Ref Range Status   01/11/2022 102 55 - 135 U/L Final     AST   Date Value Ref Range Status   01/11/2022 70 (H) 10 - 40 U/L Final     ALT   Date Value Ref Range Status   01/11/2022 138 (H) 10 - 44 U/L Final     Anion Gap   Date Value Ref Range Status   01/11/2022 13 8 - 16 mmol/L Final     eGFR if    Date Value Ref Range Status   01/11/2022 >60 >60 mL/min/1.73 m^2 Final     eGFR if non    Date Value Ref Range Status   01/11/2022 >60 >60 mL/min/1.73 m^2 Final     Comment:     Calculation used to obtain the estimated glomerular filtration  rate (eGFR) is the CKD-EPI equation.        Microbiology Results (last 7 days)     ** No results found for the last 168 hours. **        Significant Imaging:  CXR:   Patchy interstitial infiltrates in the mid and lower lung fields likely due to a viral pneumonitis.    CXR: Negative chest.  No appreciable infiltrate on this exam.    CXR: Mild bilateral airspace disease with distribution suspicious for edema, COVID or other atypical infection.

## 2022-01-11 NOTE — PLAN OF CARE
01/11/22 0740   Patient Assessment/Suction   Respiratory Effort Unlabored   Expansion/Accessory Muscles/Retractions no retractions;expansion symmetric   All Lung Fields Breath Sounds Anterior:;Lateral:;diminished   Rhythm/Pattern, Respiratory unlabored   Cough Frequency infrequent   Cough Type good;productive   PRE-TX-O2   O2 Device (Oxygen Therapy) Vapotherm   $ Is the patient on High Flow Oxygen? Yes   $ Vapotherm Daily Charge Vapotherm Daily   Humidification temp set 33   Humidification temp actual 33   Flow (L/min) 35   Oxygen Concentration (%) 85   SpO2 (!) 91 %   Pulse Oximetry Type Continuous   $ Pulse Oximetry - Multiple Charge Pulse Oximetry - Multiple   Pulse 74   Resp (!) 24   Inhaler   $ Inhaler Charges MDI (Metered Dose Inahler) Treatment   Respiratory Treatment Status (Inhaler) given   Treatment Route (Inhaler) spacer/holding chamber   Patient Position (Inhaler) HOB elevated   Post Treatment Assessment (Inhaler) breath sounds unchanged   Signs of Intolerance (Inhaler) none   Breath Sounds Post-Respiratory Treatment   Throughout All Fields Post-Treatment All Fields   Throughout All Fields Post-Treatment no change   Post-treatment Heart Rate (beats/min) 78   Post-treatment Resp Rate (breaths/min) 24   Incentive Spirometer   $ Incentive Spirometer Charges done independently per patient   Administration (IS) self-administered   Number of Repetitions (IS) 10   Level Incentive Spirometer (mL) 2000   Patient Tolerance (IS) good   Vibratory PEP Therapy   $ Vibratory PEP Charges Aerobika Therapy   $ Vibratory PEP Tech Time Charges 15 min   Type (PEP Therapy) vibratory/oscillatory   Device (PEP Therapy) flutter   Route (PEP Therapy) mouthpiece   Breaths per Cycle (PEP Therapy) 10   Cycles (PEP Therapy) 1   Settings (PEP Therapy) PEP 5   Patient Position (PEP Therapy) HOB elevated   Post Treatment Assessment (PEP) breath sounds unchanged   Signs of Intolerance (PEP Therapy) none   Ready to Wean/Extubation  Screen   FIO2<=50 (chart decimal) (!) 0.85

## 2022-01-11 NOTE — PLAN OF CARE
01/11/22 1258   Patient Assessment/Suction   Respiratory Effort Unlabored   Expansion/Accessory Muscles/Retractions expansion symmetric;no retractions   All Lung Fields Breath Sounds Anterior:;Lateral:;diminished   Rhythm/Pattern, Respiratory pattern regular;unlabored   PRE-TX-O2   O2 Device (Oxygen Therapy) Vapotherm   $ Is the patient on High Flow Oxygen? Yes   $ Vapotherm Daily Charge Vapotherm Daily   Humidification temp set 33   Humidification temp actual 33   Flow (L/min) 35   Oxygen Concentration (%) 90   SpO2 (!) 92 %   Pulse Oximetry Type Continuous   $ Pulse Oximetry - Multiple Charge Pulse Oximetry - Multiple   Pulse 75   Resp (!) 26   Aerosol Therapy   $ Aerosol Therapy Charges Aerosol Treatment   Respiratory Treatment Status (SVN) given   Treatment Route (SVN) mask   Patient Position (SVN) sitting in chair   Post Treatment Assessment (SVN) breath sounds unchanged   Signs of Intolerance (SVN) none   Breath Sounds Post-Respiratory Treatment   Throughout All Fields Post-Treatment All Fields   Throughout All Fields Post-Treatment no change   Post-treatment Heart Rate (beats/min) 72   Post-treatment Resp Rate (breaths/min) 24   Incentive Spirometer   $ Incentive Spirometer Charges done independently per patient   Administration (IS) self-administered   Vibratory PEP Therapy   $ Vibratory PEP Charges Aerobika Therapy   $ Vibratory PEP Tech Time Charges 15 min   Type (PEP Therapy) vibratory/oscillatory   Device (PEP Therapy) flutter   Route (PEP Therapy) mouthpiece   Breaths per Cycle (PEP Therapy) 10   Cycles (PEP Therapy) 1   Settings (PEP Therapy) PEP 5   Patient Position (PEP Therapy) HOB elevated   Post Treatment Assessment (PEP) breath sounds unchanged   Signs of Intolerance (PEP Therapy) none   Equipment Change   $ RT Equipment sterile water   Ready to Wean/Extubation Screen   FIO2<=50 (chart decimal) (!) 0.9   Preset CPAP/BiPAP Settings   Mode Of Delivery Standby

## 2022-01-12 LAB
GAMMA INTERFERON BACKGROUND BLD IA-ACNC: 0.06 IU/ML
M TB IFN-G CD4+ BCKGRND COR BLD-ACNC: 0 IU/ML
MITOGEN IGNF BCKGRD COR BLD-ACNC: 1.3 IU/ML
TB GOLD PLUS: NEGATIVE
TB2 - NIL: 0 IU/ML

## 2022-01-12 PROCEDURE — 27000221 HC OXYGEN, UP TO 24 HOURS

## 2022-01-12 PROCEDURE — 63600175 PHARM REV CODE 636 W HCPCS: Performed by: NURSE PRACTITIONER

## 2022-01-12 PROCEDURE — 25000003 PHARM REV CODE 250: Performed by: NURSE PRACTITIONER

## 2022-01-12 PROCEDURE — 97161 PT EVAL LOW COMPLEX 20 MIN: CPT

## 2022-01-12 PROCEDURE — 99900035 HC TECH TIME PER 15 MIN (STAT)

## 2022-01-12 PROCEDURE — 27100171 HC OXYGEN HIGH FLOW UP TO 24 HOURS

## 2022-01-12 PROCEDURE — 25000242 PHARM REV CODE 250 ALT 637 W/ HCPCS: Performed by: INTERNAL MEDICINE

## 2022-01-12 PROCEDURE — 99233 PR SUBSEQUENT HOSPITAL CARE,LEVL III: ICD-10-PCS | Mod: ,,, | Performed by: INTERNAL MEDICINE

## 2022-01-12 PROCEDURE — 94640 AIRWAY INHALATION TREATMENT: CPT

## 2022-01-12 PROCEDURE — 94660 CPAP INITIATION&MGMT: CPT

## 2022-01-12 PROCEDURE — 94799 UNLISTED PULMONARY SVC/PX: CPT

## 2022-01-12 PROCEDURE — 94761 N-INVAS EAR/PLS OXIMETRY MLT: CPT

## 2022-01-12 PROCEDURE — 25000242 PHARM REV CODE 250 ALT 637 W/ HCPCS: Performed by: NURSE PRACTITIONER

## 2022-01-12 PROCEDURE — 99233 SBSQ HOSP IP/OBS HIGH 50: CPT | Mod: ,,, | Performed by: INTERNAL MEDICINE

## 2022-01-12 PROCEDURE — C9399 UNCLASSIFIED DRUGS OR BIOLOG: HCPCS | Performed by: NURSE PRACTITIONER

## 2022-01-12 PROCEDURE — 27000207 HC ISOLATION

## 2022-01-12 PROCEDURE — 97530 THERAPEUTIC ACTIVITIES: CPT

## 2022-01-12 PROCEDURE — 20000000 HC ICU ROOM

## 2022-01-12 PROCEDURE — 25000003 PHARM REV CODE 250: Performed by: INTERNAL MEDICINE

## 2022-01-12 PROCEDURE — 94664 DEMO&/EVAL PT USE INHALER: CPT

## 2022-01-12 PROCEDURE — 25000003 PHARM REV CODE 250: Performed by: HOSPITALIST

## 2022-01-12 RX ADMIN — LEVETIRACETAM 500 MG: 500 TABLET, FILM COATED ORAL at 08:01

## 2022-01-12 RX ADMIN — IPRATROPIUM BROMIDE AND ALBUTEROL SULFATE 3 ML: 2.5; .5 SOLUTION RESPIRATORY (INHALATION) at 12:01

## 2022-01-12 RX ADMIN — IPRATROPIUM BROMIDE AND ALBUTEROL SULFATE 3 ML: 2.5; .5 SOLUTION RESPIRATORY (INHALATION) at 07:01

## 2022-01-12 RX ADMIN — FLUTICASONE PROPIONATE 100 MCG: 50 SPRAY, METERED NASAL at 08:01

## 2022-01-12 RX ADMIN — OXYCODONE HYDROCHLORIDE AND ACETAMINOPHEN 500 MG: 500 TABLET ORAL at 08:01

## 2022-01-12 RX ADMIN — MUPIROCIN: 20 OINTMENT TOPICAL at 08:01

## 2022-01-12 RX ADMIN — CETIRIZINE HYDROCHLORIDE 10 MG: 10 TABLET, FILM COATED ORAL at 08:01

## 2022-01-12 RX ADMIN — GUAIFENESIN 600 MG: 600 TABLET, EXTENDED RELEASE ORAL at 08:01

## 2022-01-12 RX ADMIN — IPRATROPIUM BROMIDE AND ALBUTEROL SULFATE 3 ML: 2.5; .5 SOLUTION RESPIRATORY (INHALATION) at 08:01

## 2022-01-12 RX ADMIN — ENOXAPARIN SODIUM 80 MG: 80 INJECTION SUBCUTANEOUS at 08:01

## 2022-01-12 RX ADMIN — DEXMEDETOMIDINE HYDROCHLORIDE 0.2 MCG/KG/HR: 4 INJECTION INTRAVENOUS at 09:01

## 2022-01-12 RX ADMIN — THERA TABS 1 TABLET: TAB at 08:01

## 2022-01-12 RX ADMIN — ATORVASTATIN CALCIUM 20 MG: 20 TABLET, FILM COATED ORAL at 08:01

## 2022-01-12 RX ADMIN — SODIUM CHLORIDE 500 ML: 0.9 INJECTION, SOLUTION INTRAVENOUS at 01:01

## 2022-01-12 RX ADMIN — IPRATROPIUM BROMIDE AND ALBUTEROL SULFATE 3 ML: 2.5; .5 SOLUTION RESPIRATORY (INHALATION) at 01:01

## 2022-01-12 RX ADMIN — DEXAMETHASONE 6 MG: 4 TABLET ORAL at 08:01

## 2022-01-12 NOTE — PLAN OF CARE
Problem: Fatigue  Goal: Improved Activity Tolerance  Outcome: Ongoing, Progressing   Out of bed to chair

## 2022-01-12 NOTE — PROGRESS NOTES
Progress Note  PULMONARY    Admit Date: 1/5/2022 01/12/2022      SUBJECTIVE:     1/8- worsening oxygenation, req 12L HFNC  1/9- on HFNC 10L this am, no fevers, VS stable  1/10- continuous cpap, sats improved, transferred to ICU for closer monitoring. Pt still denies any symptoms. Does not feel sob, cough, congestion or chest pain. Does not notice when his sats drop  1/11- on vapotherm, no new complaints  1/12- continues on vapotherm, no new issues      OBJECTIVE:     Vitals (Most recent):  Vitals:    01/12/22 0823   BP:    Pulse: 71   Resp: 18   Temp:        Vitals (24 hour range):  Temp:  [97 °F (36.1 °C)-97.7 °F (36.5 °C)]   Pulse:  []   Resp:  [0-35]   BP: ()/(50-86)   SpO2:  [78 %-100 %]       Intake/Output Summary (Last 24 hours) at 1/12/2022 0843  Last data filed at 1/12/2022 0500  Gross per 24 hour   Intake 329.44 ml   Output 950 ml   Net -620.56 ml          Physical Exam:  The patient's neuro status (alertness,orientation,cognitive function,motor skills,), pharyngeal exam (oral lesions, hygiene, abn dentition,), Neck (jvd,mass,thyroid,nodes in neck and above/below clavicle),RESPIRATORY(symmetry,effort,fremitus,percussion,auscultation),  Cor(rhythm,heart tones including gallops,perfusion,edema)ABD(distention,hepatic&splenomegaly,tenderness,masses), Skin(rash,cyanosis),Psyc(affect,judgement,).  Exam negative except for these pertinent findings:    No distress, awake and alert  Scattered rales bilaterally  No edema     Radiographs reviewed: view by direct vision   CXR 1/10- mild progression bilateral infiltrates  CXR 1/4/22- very mild pnuemonitis bilat bases    Labs     Results for ERIN MARTINEZ JR. (MRN [id number]) as of 1/10/2022 13:21   Ref. Range 1/5/2022 07:24 1/7/2022 05:13 1/10/2022 11:15   Ferritin Latest Ref Range: 20.0 - 300.0 ng/mL 2,439 (H) 1,500 (H) 1,525 (H)     No results for input(s): WBC, HGB, HCT, PLT, BAND, METAMYELOCYT, MYELOPCT, HGBA1C in the last 24 hours.  No results for  input(s): NA, K, CL, CO2, BUN, CREATININE, GLU, CALCIUM, CAION, MG, PHOS, AST, ALT, ALKPHOS, BILITOT, BILIDIR, PROT, ALBUMIN, PREALBUMIN, AMYLASE, LIPASE, CRP, HSCRP, SEDRATE, PROCAL, INR, PTT, LABHEPA, LACTATE, TROPONINI, CPK, CPKMB, MB, BNP in the last 24 hours.No results for input(s): PH, PCO2, PO2, HCO3 in the last 24 hours.  Microbiology Results (last 7 days)     ** No results found for the last 168 hours. **          Impression:  Active Hospital Problems    Diagnosis  POA    *Pneumonia due to COVID-19 virus [U07.1, J12.82]  Yes    Acute respiratory failure with hypoxia [J96.01]  Yes    Seizures [R56.9]  Yes    Hyperlipidemia [E78.5]  Yes      Resolved Hospital Problems   No resolved problems to display.               Plan:     - continue supplemental O2 to keep sats >92%. cpap if needed   - precedex drip titrate as needed  - continue inhaled bronchodilators  - incentive spirometry   - completed RDV  - continue decadron 6mg daily  - received tocilizumab  - monitor inflammatory markers  - continue empiric therapeutic lovenox  - needs to prone  - up in chair  - PT consult        Soila Choi MD  Pulmonary & Critical Care Medicine                              .

## 2022-01-12 NOTE — ASSESSMENT & PLAN NOTE
- COVID-19 testing   - Infection Control notified     - Isolation:   - Airborne, Contact and Droplet Precautions  - Cohort patients into COVID units  - N95 mask, wear eye protection  - 20 second hand hygiene              - Limit visitors per hospital policy              - Consolidating lab draws, nursing care, provider visits, and interventions    - Diagnostics: (leukopenia, hyponatremia, hyperferritinemia, elevated troponin, elevated d-dimer, age, and comorbidities are significant predictors of poor clinical outcome)  CBC, CMP, Procalcitonin, Ferritin, CRP, LDH, BNP, Troponin, ECG, Blood Culture x2, Portable CXR and UA and culture    - Management:  Supplemental O2 to maintain SpO2 >92%  Telemetry  Continuous/intermittent Pulse Ox  Albuterol treatment PRN  Completed Remdesivir + Tocilizumab.  Inflammation markers trending down.  Dexamethasone  FD lovenox per [facility] policy - patient counseled on risks and benefits and is agreeable.  Give intravenous fluid bolus today for low blood pressure.  Patient is asymptomatic.  Advance Care Planning   code status:  FULL code

## 2022-01-12 NOTE — PLAN OF CARE
Patient is alert and oriented x4, vital signs stable, afebrile, no desaturation episodes, oxygen saturation where he had an episode o denies any pain or distress, educated on breathing exercise for anxiety, needs assessed, safety sweep done, swallowed pills whole with sips of water, will continue to monitor.

## 2022-01-12 NOTE — SUBJECTIVE & OBJECTIVE
Interval History: Patient seen and examined.  Appears comfortable with Vapotherm use. FiO2 80%. Overnight patient has used CPAP therapy.  Wife at [facility]. Completed IV Remdesivir + Tocilizumab. On PO dexamethsone.  Blood pressure is on lower side today.  Patient is asymptomatic.    Review of Systems   Constitutional: Negative for chills and fever.   HENT: Negative for congestion and sore throat.    Respiratory: Positive for cough. Negative for shortness of breath.    Cardiovascular: Negative for chest pain and palpitations.   Gastrointestinal: Negative for abdominal pain, diarrhea, nausea and vomiting.   Genitourinary: Negative for flank pain and hematuria.   Musculoskeletal: Negative for neck pain and neck stiffness.   Skin: Negative for pallor and rash.   Neurological: Positive for headaches. Negative for weakness.   Psychiatric/Behavioral: Negative for agitation and confusion.   All other systems reviewed and are negative.    Objective:     Vital Signs (Most Recent):  Temp: 97.6 °F (36.4 °C) (01/12/22 0415)  Pulse: 71 (01/12/22 0823)  Resp: 18 (01/12/22 0823)  BP: (!) 88/62 (01/12/22 0800)  SpO2: (!) 92 % (01/12/22 0823) Vital Signs (24h Range):  Temp:  [97 °F (36.1 °C)-97.7 °F (36.5 °C)] 97.6 °F (36.4 °C)  Pulse:  [] 71  Resp:  [16-35] 18  SpO2:  [78 %-100 %] 92 %  BP: ()/(50-86) 88/62     Weight: 84.5 kg (186 lb 4.6 oz)  Body mass index is 27.51 kg/m².    Intake/Output Summary (Last 24 hours) at 1/12/2022 1024  Last data filed at 1/12/2022 0800  Gross per 24 hour   Intake 335.54 ml   Output 950 ml   Net -614.46 ml      Physical Exam  Constitutional:       General: He is not in acute distress.     Appearance: He is well-developed.   HENT:      Head: Normocephalic and atraumatic.   Eyes:      Conjunctiva/sclera: Conjunctivae normal.      Pupils: Pupils are equal, round, and reactive to light.   Cardiovascular:      Rate and Rhythm: Normal rate and regular rhythm.      Heart sounds: Normal heart  sounds.   Pulmonary:      Effort: No tachypnea.      Breath sounds: Decreased breath sounds and rales (bibasilar, L>R) present.   Abdominal:      General: Bowel sounds are normal. There is no distension.      Palpations: Abdomen is soft.      Tenderness: There is no abdominal tenderness.   Musculoskeletal:         General: Normal range of motion.      Cervical back: Normal range of motion and neck supple.   Skin:     General: Skin is warm and dry.   Neurological:      Mental Status: He is alert and oriented to person, place, and time.   Psychiatric:         Behavior: Behavior normal.         Significant Labs:   Lab Results   Component Value Date    WBC 6.95 01/11/2022    HGB 15.8 01/11/2022    HCT 46.0 01/11/2022    MCV 86 01/11/2022     01/11/2022     CMP  Sodium   Date Value Ref Range Status   01/11/2022 135 (L) 136 - 145 mmol/L Final     Potassium   Date Value Ref Range Status   01/11/2022 3.9 3.5 - 5.1 mmol/L Final     Chloride   Date Value Ref Range Status   01/11/2022 101 95 - 110 mmol/L Final     CO2   Date Value Ref Range Status   01/11/2022 21 (L) 23 - 29 mmol/L Final     Glucose   Date Value Ref Range Status   01/11/2022 112 (H) 70 - 110 mg/dL Final     BUN   Date Value Ref Range Status   01/11/2022 23 (H) 6 - 20 mg/dL Final     Creatinine   Date Value Ref Range Status   01/11/2022 0.8 0.5 - 1.4 mg/dL Final     Calcium   Date Value Ref Range Status   01/11/2022 9.1 8.7 - 10.5 mg/dL Final     Total Protein   Date Value Ref Range Status   01/11/2022 6.8 6.0 - 8.4 g/dL Final     Albumin   Date Value Ref Range Status   01/11/2022 3.0 (L) 3.5 - 5.2 g/dL Final     Total Bilirubin   Date Value Ref Range Status   01/11/2022 0.8 0.1 - 1.0 mg/dL Final     Comment:     For infants and newborns, interpretation of results should be based  on gestational age, weight and in agreement with clinical  observations.    Premature Infant recommended reference ranges:  Up to 24 hours.............<8.0 mg/dL  Up to 48  hours............<12.0 mg/dL  3-5 days..................<15.0 mg/dL  6-29 days.................<15.0 mg/dL       Alkaline Phosphatase   Date Value Ref Range Status   01/11/2022 102 55 - 135 U/L Final     AST   Date Value Ref Range Status   01/11/2022 70 (H) 10 - 40 U/L Final     ALT   Date Value Ref Range Status   01/11/2022 138 (H) 10 - 44 U/L Final     Anion Gap   Date Value Ref Range Status   01/11/2022 13 8 - 16 mmol/L Final     eGFR if    Date Value Ref Range Status   01/11/2022 >60 >60 mL/min/1.73 m^2 Final     eGFR if non    Date Value Ref Range Status   01/11/2022 >60 >60 mL/min/1.73 m^2 Final     Comment:     Calculation used to obtain the estimated glomerular filtration  rate (eGFR) is the CKD-EPI equation.        Microbiology Results (last 7 days)     ** No results found for the last 168 hours. **        Significant Imaging:  CXR:   Patchy interstitial infiltrates in the mid and lower lung fields likely due to a viral pneumonitis.    CXR: Negative chest.  No appreciable infiltrate on this exam.    CXR: Mild bilateral airspace disease with distribution suspicious for edema, COVID or other atypical infection.

## 2022-01-12 NOTE — PLAN OF CARE
Problem: Physical Therapy Goal  Goal: Physical Therapy Goal  Description: Goals to be met by: 22    Patient will increase functional independence with mobility by performin. Supine to sit with [place]  2. Sit to supine with [place]  3. Sit to stand transfer with [place]  4. Bed to chair transfer with [place] using No Assistive Device  5. Gait  x 150 feet with Pre[place] using No Assistive Device.     Outcome: Ongoing, Progressing

## 2022-01-12 NOTE — PROGRESS NOTES
[facility] Hospital Medicine  Progress Note    Patient Name: Stefano Donahue Jr.  MRN: [id number]  Patient Class: IP- Inpatient   Admission Date: 1/5/2022  Length of Stay: 7 days  Attending Physician: Estrella Lama MD  Primary Care Provider: Dario Ayon MD        Subjective:     Principal Problem:Pneumonia due to COVID-19 virus        HPI:  Stefano Donahue Jr. Is a 57 y/o male with a past medical history significant for HLD and seizures who presented to the ED for further evaluation of hypoxia.  He was diagnosed with COVID-19 one week ago.  His wife purchased a pulse ox in order to check the patient's oxygen saturation and today was noted to have a sat of 88%.  He was evaluated in the urgent care and thought to have pneumonia so he was sent to the ED.  During my interview, the patient's oxygen saturation was noted at 88-89% on room air and he was placed on supplemental oxygen.  Denies feeling SOB or chest pain, no nausea or vomiting.  He is placed in observation under the service of hospital medicine for continued medical management.         Overview/Hospital Course:  56-year-old male with history of hyperlipidemia and seizures admitted to the hospital medicine service with hypoxic respiratory failure secondary to COVID He was placed on supplemental oxygen, p.o. dexamethasone, Remdesivir and full-dose Lovenox.  Progressively patient required higher quantities of supplemental oxygen.  Patient received intravenous tocilizumab.  Pulmonary service was consulted.  Later on patient required transfer to intensive care unit for closer monitoring and use of noninvasive ventilatory therapy.      Interval History: Patient seen and examined.  Appears comfortable with Vapotherm use. FiO2 80%. Overnight patient has used CPAP therapy.  Wife at [facility]. Completed IV Remdesivir + Tocilizumab. On PO dexamethsone.  Blood pressure is on lower side today.  Patient is asymptomatic.    Review of Systems    Constitutional: Negative for chills and fever.   HENT: Negative for congestion and sore throat.    Respiratory: Positive for cough. Negative for shortness of breath.    Cardiovascular: Negative for chest pain and palpitations.   Gastrointestinal: Negative for abdominal pain, diarrhea, nausea and vomiting.   Genitourinary: Negative for flank pain and hematuria.   Musculoskeletal: Negative for neck pain and neck stiffness.   Skin: Negative for pallor and rash.   Neurological: Positive for headaches. Negative for weakness.   Psychiatric/Behavioral: Negative for agitation and confusion.   All other systems reviewed and are negative.    Objective:     Vital Signs (Most Recent):  Temp: 97.6 °F (36.4 °C) (01/12/22 0415)  Pulse: 71 (01/12/22 0823)  Resp: 18 (01/12/22 0823)  BP: (!) 88/62 (01/12/22 0800)  SpO2: (!) 92 % (01/12/22 0823) Vital Signs (24h Range):  Temp:  [97 °F (36.1 °C)-97.7 °F (36.5 °C)] 97.6 °F (36.4 °C)  Pulse:  [] 71  Resp:  [16-35] 18  SpO2:  [78 %-100 %] 92 %  BP: ()/(50-86) 88/62     Weight: 84.5 kg (186 lb 4.6 oz)  Body mass index is 27.51 kg/m².    Intake/Output Summary (Last 24 hours) at 1/12/2022 1024  Last data filed at 1/12/2022 0800  Gross per 24 hour   Intake 335.54 ml   Output 950 ml   Net -614.46 ml      Physical Exam  Constitutional:       General: He is not in acute distress.     Appearance: He is well-developed.   HENT:      Head: Normocephalic and atraumatic.   Eyes:      Conjunctiva/sclera: Conjunctivae normal.      Pupils: Pupils are equal, round, and reactive to light.   Cardiovascular:      Rate and Rhythm: Normal rate and regular rhythm.      Heart sounds: Normal heart sounds.   Pulmonary:      Effort: No tachypnea.      Breath sounds: Decreased breath sounds and rales (bibasilar, L>R) present.   Abdominal:      General: Bowel sounds are normal. There is no distension.      Palpations: Abdomen is soft.      Tenderness: There is no abdominal tenderness.   Musculoskeletal:          General: Normal range of motion.      Cervical back: Normal range of motion and neck supple.   Skin:     General: Skin is warm and dry.   Neurological:      Mental Status: He is alert and oriented to person, place, and time.   Psychiatric:         Behavior: Behavior normal.         Significant Labs:   Lab Results   Component Value Date    WBC 6.95 01/11/2022    HGB 15.8 01/11/2022    HCT 46.0 01/11/2022    MCV 86 01/11/2022     01/11/2022     CMP  Sodium   Date Value Ref Range Status   01/11/2022 135 (L) 136 - 145 mmol/L Final     Potassium   Date Value Ref Range Status   01/11/2022 3.9 3.5 - 5.1 mmol/L Final     Chloride   Date Value Ref Range Status   01/11/2022 101 95 - 110 mmol/L Final     CO2   Date Value Ref Range Status   01/11/2022 21 (L) 23 - 29 mmol/L Final     Glucose   Date Value Ref Range Status   01/11/2022 112 (H) 70 - 110 mg/dL Final     BUN   Date Value Ref Range Status   01/11/2022 23 (H) 6 - 20 mg/dL Final     Creatinine   Date Value Ref Range Status   01/11/2022 0.8 0.5 - 1.4 mg/dL Final     Calcium   Date Value Ref Range Status   01/11/2022 9.1 8.7 - 10.5 mg/dL Final     Total Protein   Date Value Ref Range Status   01/11/2022 6.8 6.0 - 8.4 g/dL Final     Albumin   Date Value Ref Range Status   01/11/2022 3.0 (L) 3.5 - 5.2 g/dL Final     Total Bilirubin   Date Value Ref Range Status   01/11/2022 0.8 0.1 - 1.0 mg/dL Final     Comment:     For infants and newborns, interpretation of results should be based  on gestational age, weight and in agreement with clinical  observations.    Premature Infant recommended reference ranges:  Up to 24 hours.............<8.0 mg/dL  Up to 48 hours............<12.0 mg/dL  3-5 days..................<15.0 mg/dL  6-29 days.................<15.0 mg/dL       Alkaline Phosphatase   Date Value Ref Range Status   01/11/2022 102 55 - 135 U/L Final     AST   Date Value Ref Range Status   01/11/2022 70 (H) 10 - 40 U/L Final     ALT   Date Value Ref Range  Status   01/11/2022 138 (H) 10 - 44 U/L Final     Anion Gap   Date Value Ref Range Status   01/11/2022 13 8 - 16 mmol/L Final     eGFR if    Date Value Ref Range Status   01/11/2022 >60 >60 mL/min/1.73 m^2 Final     eGFR if non    Date Value Ref Range Status   01/11/2022 >60 >60 mL/min/1.73 m^2 Final     Comment:     Calculation used to obtain the estimated glomerular filtration  rate (eGFR) is the CKD-EPI equation.        Microbiology Results (last 7 days)     ** No results found for the last 168 hours. **        Significant Imaging:  CXR:   Patchy interstitial infiltrates in the mid and lower lung fields likely due to a viral pneumonitis.    CXR: Negative chest.  No appreciable infiltrate on this exam.    CXR: Mild bilateral airspace disease with distribution suspicious for edema, COVID or other atypical infection.         Assessment/Plan:      * Pneumonia due to COVID-19 virus  - COVID-19 testing   - Infection Control notified     - Isolation:   - Airborne, Contact and Droplet Precautions  - Cohort patients into COVID units  - N95 mask, wear eye protection  - 20 second hand hygiene              - Limit visitors per hospital policy              - Consolidating lab draws, nursing care, provider visits, and interventions    - Diagnostics: (leukopenia, hyponatremia, hyperferritinemia, elevated troponin, elevated d-dimer, age, and comorbidities are significant predictors of poor clinical outcome)  CBC, CMP, Procalcitonin, Ferritin, CRP, LDH, BNP, Troponin, ECG, Blood Culture x2, Portable CXR and UA and culture    - Management:  Supplemental O2 to maintain SpO2 >92%  Telemetry  Continuous/intermittent Pulse Ox  Albuterol treatment PRN  Completed Remdesivir + Tocilizumab.  Inflammation markers trending down.  Dexamethasone  FD lovenox per [facility] policy - patient counseled on risks and benefits and is agreeable.  Give intravenous fluid bolus today for low blood pressure.  Patient is  asymptomatic.  Advance Care Planning   code status:  FULL code               Seizures  Chronic condition.  Maintain seizure/fall/aspiration precautions.  Continue chronic keppra.      Acute respiratory failure with hypoxia  Patient with Hypoxic Respiratory failure which is Acute.  he is not on home oxygen. Supplemental oxygen was provided and noted-  .   Signs/symptoms of respiratory failure include- tachypnea. Contributing diagnoses includes - COVID 19 Labs and images were reviewed. Patient Has not had a recent ABG. Will treat underlying causes and adjust management of respiratory failure as follows- continue supplemental oxygen as needed.    Hyperlipidemia   Patient is chronically on statin.will continue for now. Monitor clinically. Last LDL was No results found for: LDLCALC         Discussed with patient's wife at bedside, answered all questions.  VTE Risk Mitigation (From admission, onward)         Ordered     enoxaparin injection 80 mg  2 times daily         01/05/22 0626                Discharge Planning   NEL:      Code Status: Full Code   Is the patient medically ready for discharge?:     Reason for patient still in hospital (select all that apply): Patient trending condition  Discharge Plan A: Long-term acute care facility (LTAC)            Critical care time spent on the evaluation and treatment of severe organ dysfunction, review of pertinent labs and imaging studies, discussions with consulting providers and discussions with patient/family: 33 minutes.      Estrella Lama MD  Department of Hospital Medicine   [facility]

## 2022-01-12 NOTE — PLAN OF CARE
Problem: Adult Inpatient Plan of Care  Goal: Plan of Care Review  Outcome: Ongoing, Progressing  Goal: Patient-Specific Goal (Individualized)  Outcome: Ongoing, Progressing  Goal: Absence of Hospital-Acquired Illness or Injury  Outcome: Ongoing, Progressing  Goal: Optimal Comfort and Wellbeing  Outcome: Ongoing, Progressing  Goal: Readiness for Transition of Care  Outcome: Ongoing, Progressing     Problem: Fall Injury Risk  Goal: Absence of Fall and Fall-Related Injury  Outcome: Ongoing, Progressing     Problem: Oral Intake Inadequate  Goal: Improved Oral Intake  Outcome: Ongoing, Progressing     Problem: Fatigue  Goal: Improved Activity Tolerance  Outcome: Ongoing, Progressing     Problem: Skin Injury Risk Increased  Goal: Skin Health and Integrity  Outcome: Ongoing, Progressing   Encouraged getting out of bed chair, compliant with breathing exercises,

## 2022-01-12 NOTE — PT/OT/SLP EVAL
Physical Therapy Evaluation    Patient Name:  Stefano Donahue Jr.   MRN:  [id number]    Recommendations:     Discharge Recommendations:  home   Discharge Equipment Recommendations: none   Barriers to discharge: None    Assessment:     Stefano Donahue Jr. is a 56 y.o. male admitted with a medical diagnosis of Pneumonia due to COVID-19 virus.  He presents with the following impairments/functional limitations:  weakness,impaired endurance,impaired functional mobilty,gait instability,impaired balance,impaired cardiopulmonary response to activity .  Patient agreeable to PT evaluation but did report SOB with minimal activity.  Patient presented sitting in chair at bedside and was able to stand 3 times with SBA and no AD.  O2 sats monitored in standing each time and would go down < 88% after a minutes or so of standing so patient would sit and sats would recover to > 92%.    Rehab Prognosis: Good; patient would benefit from acute skilled PT services to address these deficits and reach maximum level of function.    Recent Surgery: * No surgery found *      Plan:     During this hospitalization, patient to be seen 5 x/week to address the identified rehab impairments via gait training,therapeutic activities,therapeutic exercises and progress toward the following goals:    · Plan of Care Expires:  02/16/22    Subjective     Chief Complaint: SOB  Patient/Family Comments/goals: go home  Pain/Comfort:  ·      Patients cultural, spiritual, Buddhist conflicts given the current situation:      Living Environment:  Currently lives with family in 1 story home.  Prior to admission, patients level of function was independent.  Equipment used at home: none.  DME owned (not currently used): none.  Upon discharge, patient will have assistance from family.    Objective:     Communicated with nurse prior to session.  Patient found up in chair with oxygen,peripheral IV,pulse ox (continuous),telemetry  upon PT entry to room.    General  Precautions: Standard, fall,droplet,contact,airborne   Orthopedic Precautions:N/A   Braces:    Respiratory Status: vapotherm    Exams:  · RLE ROM: WFL  · RLE Strength: WNL  · LLE ROM: WFL  · LLE Strength: WNL    Functional Mobility:  · Transfers:     · Sit to Stand:  stand by assistance with no AD    Therapeutic Activities and Exercises:   standing tolerance 3 x  seconds no AD with SBA and O2 sats monitored during standing    AM-PAC 6 CLICK MOBILITY  Total Score:18     Patient left up in chair with call button in reach, nurse notified and family present.    GOALS:   Multidisciplinary Problems     Physical Therapy Goals        Problem: Physical Therapy Goal    Goal Priority Disciplines Outcome Goal Variances Interventions   Physical Therapy Goal     PT, PT/OT Ongoing, Progressing     Description: Goals to be met by: 22    Patient will increase functional independence with mobility by performin. Supine to sit with [place]  2. Sit to supine with [place]  3. Sit to stand transfer with [place]  4. Bed to chair transfer with [place] using No Assistive Device  5. Gait  x 150 feet with [place] using No Assistive Device.                      History:     Past Medical History:   Diagnosis Date    Anxiety     flying    Claustrophobia     Dizziness     Hyperlipidemia        Past Surgical History:   Procedure Laterality Date    APPENDECTOMY      COLONOSCOPY  2013    Dr Cheo lucero 5 years    VASECTOMY         Time Tracking:     PT Received On: 22  PT Start Time: 1343     PT Stop Time: 1415  PT Total Time (min): 32 min     Billable Minutes: Evaluation 20 and Therapeutic Activity 12      2022

## 2022-01-12 NOTE — PLAN OF CARE
01/12/22 0823   Patient Assessment/Suction   Respiratory Effort Unlabored   Expansion/Accessory Muscles/Retractions expansion symmetric;no retractions   All Lung Fields Breath Sounds Anterior:;Lateral:;diminished   Rhythm/Pattern, Respiratory unlabored   PRE-TX-O2   O2 Device (Oxygen Therapy) Vapotherm   $ Is the patient on High Flow Oxygen? Yes   $ Vapotherm Daily Charge Vapotherm Daily   Oxygen Concentration (%) 80   SpO2 (!) 92 %   Pulse Oximetry Type Continuous   $ Pulse Oximetry - Multiple Charge Pulse Oximetry - Multiple   Pulse 71   Resp 18   Aerosol Therapy   $ Aerosol Therapy Charges Aerosol Treatment   Respiratory Treatment Status (SVN) given   Treatment Route (SVN) in-line   Patient Position (SVN) HOB elevated   Post Treatment Assessment (SVN) breath sounds unchanged   Signs of Intolerance (SVN) none   Breath Sounds Post-Respiratory Treatment   Throughout All Fields Post-Treatment All Fields   Throughout All Fields Post-Treatment no change   Post-treatment Heart Rate (beats/min) 70   Post-treatment Resp Rate (breaths/min) 22   Incentive Spirometer   $ Incentive Spirometer Charges done with encouragement   Administration (IS) self-administered   Vibratory PEP Therapy   Route (PEP Therapy) independent;mouthpiece;self-administered   Skin Integrity   $ Wound Care Tech Time 15 min   Area Observed Bridge of nose   Skin Appearance without discoloration   Barrier used? Liquid Filled Cushion   Ready to Wean/Extubation Screen   FIO2<=50 (chart decimal) (!) 0.8   Preset CPAP/BiPAP Settings   Mode Of Delivery Standby

## 2022-01-13 LAB
CRP SERPL-MCNC: 1.8 MG/L (ref 0–8.2)
D DIMER PPP IA.FEU-MCNC: 4.05 MG/L FEU

## 2022-01-13 PROCEDURE — 25000003 PHARM REV CODE 250: Performed by: INTERNAL MEDICINE

## 2022-01-13 PROCEDURE — 97530 THERAPEUTIC ACTIVITIES: CPT

## 2022-01-13 PROCEDURE — 25000003 PHARM REV CODE 250: Performed by: NURSE PRACTITIONER

## 2022-01-13 PROCEDURE — 12000002 HC ACUTE/MED SURGE SEMI-PRIVATE ROOM

## 2022-01-13 PROCEDURE — 25000242 PHARM REV CODE 250 ALT 637 W/ HCPCS: Performed by: INTERNAL MEDICINE

## 2022-01-13 PROCEDURE — 25000242 PHARM REV CODE 250 ALT 637 W/ HCPCS: Performed by: NURSE PRACTITIONER

## 2022-01-13 PROCEDURE — 63600175 PHARM REV CODE 636 W HCPCS: Performed by: INTERNAL MEDICINE

## 2022-01-13 PROCEDURE — 25000003 PHARM REV CODE 250: Performed by: HOSPITALIST

## 2022-01-13 PROCEDURE — 85379 FIBRIN DEGRADATION QUANT: CPT | Performed by: HOSPITALIST

## 2022-01-13 PROCEDURE — 99900035 HC TECH TIME PER 15 MIN (STAT)

## 2022-01-13 PROCEDURE — 94660 CPAP INITIATION&MGMT: CPT

## 2022-01-13 PROCEDURE — 36415 COLL VENOUS BLD VENIPUNCTURE: CPT | Performed by: HOSPITALIST

## 2022-01-13 PROCEDURE — 99233 SBSQ HOSP IP/OBS HIGH 50: CPT | Mod: ,,, | Performed by: INTERNAL MEDICINE

## 2022-01-13 PROCEDURE — 27000221 HC OXYGEN, UP TO 24 HOURS

## 2022-01-13 PROCEDURE — 27000207 HC ISOLATION

## 2022-01-13 PROCEDURE — 94799 UNLISTED PULMONARY SVC/PX: CPT

## 2022-01-13 PROCEDURE — 86140 C-REACTIVE PROTEIN: CPT | Performed by: HOSPITALIST

## 2022-01-13 PROCEDURE — 94640 AIRWAY INHALATION TREATMENT: CPT

## 2022-01-13 PROCEDURE — 97110 THERAPEUTIC EXERCISES: CPT

## 2022-01-13 PROCEDURE — 27100171 HC OXYGEN HIGH FLOW UP TO 24 HOURS

## 2022-01-13 PROCEDURE — 94761 N-INVAS EAR/PLS OXIMETRY MLT: CPT

## 2022-01-13 PROCEDURE — 63600175 PHARM REV CODE 636 W HCPCS: Performed by: NURSE PRACTITIONER

## 2022-01-13 PROCEDURE — 94664 DEMO&/EVAL PT USE INHALER: CPT

## 2022-01-13 PROCEDURE — 99233 PR SUBSEQUENT HOSPITAL CARE,LEVL III: ICD-10-PCS | Mod: ,,, | Performed by: INTERNAL MEDICINE

## 2022-01-13 RX ORDER — HYDROXYZINE HYDROCHLORIDE 25 MG/1
25 TABLET, FILM COATED ORAL 3 TIMES DAILY PRN
Status: DISCONTINUED | OUTPATIENT
Start: 2022-01-13 | End: 2022-01-17 | Stop reason: HOSPADM

## 2022-01-13 RX ADMIN — MUPIROCIN: 20 OINTMENT TOPICAL at 08:01

## 2022-01-13 RX ADMIN — FLUTICASONE PROPIONATE 100 MCG: 50 SPRAY, METERED NASAL at 08:01

## 2022-01-13 RX ADMIN — IPRATROPIUM BROMIDE AND ALBUTEROL SULFATE 3 ML: 2.5; .5 SOLUTION RESPIRATORY (INHALATION) at 12:01

## 2022-01-13 RX ADMIN — OXYCODONE HYDROCHLORIDE AND ACETAMINOPHEN 500 MG: 500 TABLET ORAL at 08:01

## 2022-01-13 RX ADMIN — LEVETIRACETAM 500 MG: 500 TABLET, FILM COATED ORAL at 08:01

## 2022-01-13 RX ADMIN — DEXAMETHASONE 6 MG: 4 TABLET ORAL at 08:01

## 2022-01-13 RX ADMIN — ENOXAPARIN SODIUM 80 MG: 80 INJECTION SUBCUTANEOUS at 08:01

## 2022-01-13 RX ADMIN — GUAIFENESIN 600 MG: 600 TABLET, EXTENDED RELEASE ORAL at 08:01

## 2022-01-13 RX ADMIN — IPRATROPIUM BROMIDE AND ALBUTEROL SULFATE 3 ML: 2.5; .5 SOLUTION RESPIRATORY (INHALATION) at 07:01

## 2022-01-13 RX ADMIN — CETIRIZINE HYDROCHLORIDE 10 MG: 10 TABLET, FILM COATED ORAL at 08:01

## 2022-01-13 RX ADMIN — IPRATROPIUM BROMIDE AND ALBUTEROL SULFATE 3 ML: 2.5; .5 SOLUTION RESPIRATORY (INHALATION) at 08:01

## 2022-01-13 RX ADMIN — ATORVASTATIN CALCIUM 20 MG: 20 TABLET, FILM COATED ORAL at 08:01

## 2022-01-13 RX ADMIN — THERA TABS 1 TABLET: TAB at 08:01

## 2022-01-13 NOTE — PT/OT/SLP PROGRESS
Physical Therapy Treatment    Patient Name:  Stefano Donahue Jr.   MRN:  [id number]    Recommendations:     Discharge Recommendations:  home   Discharge Equipment Recommendations: none   Barriers to discharge: None    Assessment:     Stefano Donahue Jr. is a 57 y.o. male admitted with a medical diagnosis of Pneumonia due to COVID-19 virus.  He presents with the following impairments/functional limitations:  weakness,impaired endurance,impaired functional mobilty,gait instability,impaired balance,decreased lower extremity function,decreased ROM,impaired cardiopulmonary response to activity .  Patient agreeable to PT treatment this morning.  Patient instructed in HEP for LE strengthening in supine with written/verbal instructions.  Patient then transferred supine to sit with SBA and then sit to stand x 3 times with SBA.  Patient stood each time with SBA and no AD but was only able to stand 60-75 seconds because O2 sats would go down below 88% so patient would sit back down.  Patient then transferred over to chair with no AD with SBA.    Rehab Prognosis: Good; patient would benefit from acute skilled PT services to address these deficits and reach maximum level of function.    Recent Surgery: * No surgery found *      Plan:     During this hospitalization, patient to be seen 5 x/week to address the identified rehab impairments via gait training,therapeutic activities,therapeutic exercises and progress toward the following goals:    · Plan of Care Expires:  02/16/22    Subjective     Chief Complaint: SOB and fatigue  Patient/Family Comments/goals: go home  Pain/Comfort:  ·        Objective:     Communicated with nurse prior to session.  Patient found supine with bed alarm,blood pressure cuff,oxygen,peripheral IV,pulse ox (continuous),telemetry upon PT entry to room.     General Precautions: Standard, airborne,contact,droplet,fall   Orthopedic Precautions:N/A   Braces:    Respiratory Status: High flow, flow 35 L/min,  concentration 75%     Functional Mobility:  · Bed Mobility:     · Supine to Sit: stand by assistance  · Transfers:     · Sit to Stand:  stand by assistance with no AD  · Bed to Chair: stand by assistance with  no AD  using  Step Transfer      AM-PAC 6 CLICK MOBILITY          Therapeutic Activities and Exercises:   instructed in LE strengthening exercise in supine with written/verbal instructions and gave 3 reps return demo  Transfer training supine to sit SBA, sit to stand sBA, EOB to chair SBA  Standing tolerance 3 x 60-75 seconds SBA    Patient left up in chair with call button in reach, nurse notified and wife present..    GOALS:   Multidisciplinary Problems     Physical Therapy Goals        Problem: Physical Therapy Goal    Goal Priority Disciplines Outcome Goal Variances Interventions   Physical Therapy Goal     PT, PT/OT Ongoing, Progressing     Description: Goals to be met by: 22    Patient will increase functional independence with mobility by performin. Supine to sit with [place]  2. Sit to supine with [place]  3. Sit to stand transfer with [place]  4. Bed to chair transfer with [place] using No Assistive Device  5. Gait  x 150 feet with [place] using No Assistive Device.                      Time Tracking:     PT Received On: 22  PT Start Time: 928     PT Stop Time:   PT Total Time (min): 34 min     Billable Minutes: Therapeutic Activity 20 and Therapeutic Exercise 14    Treatment Type: Treatment  PT/PTA: PT     PTA Visit Number: 0     2022

## 2022-01-13 NOTE — PROGRESS NOTES
[facility] Hospital Medicine  Progress Note    Patient Name: Stefano Donahue Jr.  MRN: [id number]  Patient Class: IP- Inpatient   Admission Date: 1/5/2022  Length of Stay: 8 days  Attending Physician: Estrella Lama MD  Primary Care Provider: Dario Ayon MD        Subjective:     Principal Problem:Pneumonia due to COVID-19 virus        HPI:  Stefano Donahue Jr. Is a 55 y/o male with a past medical history significant for HLD and seizures who presented to the ED for further evaluation of hypoxia.  He was diagnosed with COVID-19 one week ago.  His wife purchased a pulse ox in order to check the patient's oxygen saturation and today was noted to have a sat of 88%.  He was evaluated in the urgent care and thought to have pneumonia so he was sent to the ED.  During my interview, the patient's oxygen saturation was noted at 88-89% on room air and he was placed on supplemental oxygen.  Denies feeling SOB or chest pain, no nausea or vomiting.  He is placed in observation under the service of hospital medicine for continued medical management.         Overview/Hospital Course:  56-year-old male with history of hyperlipidemia and seizures admitted to the hospital medicine service with hypoxic respiratory failure secondary to COVID He was placed on supplemental oxygen, p.o. dexamethasone, Remdesivir and full-dose Lovenox.  Progressively patient required higher quantities of supplemental oxygen.  Patient received intravenous tocilizumab.  Pulmonary service was consulted.  Later on patient required transfer to intensive care unit for closer monitoring and use of noninvasive ventilatory therapy.      Interval History: Patient seen and examined.  Appears comfortable with Vapotherm use. FiO2 75%, improving compared to last couple of days. Overnight patient has used CPAP therapy.  Wife at [facility]. Completed IV Remdesivir + Tocilizumab. On PO dexamethsone.  Blood pressure is on lower side today.  Patient is  asymptomatic.    Review of Systems   Constitutional: Negative for chills and fever.   HENT: Negative for congestion and sore throat.    Respiratory: Positive for cough. Negative for shortness of breath.    Cardiovascular: Negative for chest pain and palpitations.   Gastrointestinal: Negative for abdominal pain, diarrhea, nausea and vomiting.   Genitourinary: Negative for flank pain and hematuria.   Musculoskeletal: Negative for neck pain and neck stiffness.   Skin: Negative for pallor and rash.   Neurological: Positive for headaches. Negative for weakness.   Psychiatric/Behavioral: Negative for agitation and confusion.   All other systems reviewed and are negative.    Objective:     Vital Signs (Most Recent):  Temp: 97.7 °F (36.5 °C) (01/13/22 0400)  Pulse: 64 (01/13/22 0830)  Resp: 20 (01/13/22 0830)  BP: 115/69 (01/13/22 0700)  SpO2: 96 % (01/13/22 0830) Vital Signs (24h Range):  Temp:  [97.6 °F (36.4 °C)-97.9 °F (36.6 °C)] 97.7 °F (36.5 °C)  Pulse:  [49-96] 64  Resp:  [16-36] 20  SpO2:  [83 %-100 %] 96 %  BP: ()/(56-84) 115/69     Weight: 80.6 kg (177 lb 11.1 oz)  Body mass index is 26.24 kg/m².    Intake/Output Summary (Last 24 hours) at 1/13/2022 0939  Last data filed at 1/13/2022 0619  Gross per 24 hour   Intake 551.72 ml   Output 1325 ml   Net -773.28 ml      Physical Exam  Constitutional:       General: He is not in acute distress.     Appearance: He is well-developed.   HENT:      Head: Normocephalic and atraumatic.   Eyes:      Conjunctiva/sclera: Conjunctivae normal.      Pupils: Pupils are equal, round, and reactive to light.   Cardiovascular:      Rate and Rhythm: Normal rate and regular rhythm.      Heart sounds: Normal heart sounds.   Pulmonary:      Effort: No tachypnea.      Breath sounds: Decreased breath sounds and rales (bibasilar, L>R) present.   Abdominal:      General: Bowel sounds are normal. There is no distension.      Palpations: Abdomen is soft.      Tenderness: There is no  abdominal tenderness.   Musculoskeletal:         General: Normal range of motion.      Cervical back: Normal range of motion and neck supple.   Skin:     General: Skin is warm and dry.   Neurological:      Mental Status: He is alert and oriented to person, place, and time.   Psychiatric:         Behavior: Behavior normal.         Significant Labs:   Lab Results   Component Value Date    WBC 6.95 01/11/2022    HGB 15.8 01/11/2022    HCT 46.0 01/11/2022    MCV 86 01/11/2022     01/11/2022     CMP  Sodium   Date Value Ref Range Status   01/11/2022 135 (L) 136 - 145 mmol/L Final     Potassium   Date Value Ref Range Status   01/11/2022 3.9 3.5 - 5.1 mmol/L Final     Chloride   Date Value Ref Range Status   01/11/2022 101 95 - 110 mmol/L Final     CO2   Date Value Ref Range Status   01/11/2022 21 (L) 23 - 29 mmol/L Final     Glucose   Date Value Ref Range Status   01/11/2022 112 (H) 70 - 110 mg/dL Final     BUN   Date Value Ref Range Status   01/11/2022 23 (H) 6 - 20 mg/dL Final     Creatinine   Date Value Ref Range Status   01/11/2022 0.8 0.5 - 1.4 mg/dL Final     Calcium   Date Value Ref Range Status   01/11/2022 9.1 8.7 - 10.5 mg/dL Final     Total Protein   Date Value Ref Range Status   01/11/2022 6.8 6.0 - 8.4 g/dL Final     Albumin   Date Value Ref Range Status   01/11/2022 3.0 (L) 3.5 - 5.2 g/dL Final     Total Bilirubin   Date Value Ref Range Status   01/11/2022 0.8 0.1 - 1.0 mg/dL Final     Comment:     For infants and newborns, interpretation of results should be based  on gestational age, weight and in agreement with clinical  observations.    Premature Infant recommended reference ranges:  Up to 24 hours.............<8.0 mg/dL  Up to 48 hours............<12.0 mg/dL  3-5 days..................<15.0 mg/dL  6-29 days.................<15.0 mg/dL       Alkaline Phosphatase   Date Value Ref Range Status   01/11/2022 102 55 - 135 U/L Final     AST   Date Value Ref Range Status   01/11/2022 70 (H) 10 - 40 U/L  Final     ALT   Date Value Ref Range Status   01/11/2022 138 (H) 10 - 44 U/L Final     Anion Gap   Date Value Ref Range Status   01/11/2022 13 8 - 16 mmol/L Final     eGFR if    Date Value Ref Range Status   01/11/2022 >60 >60 mL/min/1.73 m^2 Final     eGFR if non    Date Value Ref Range Status   01/11/2022 >60 >60 mL/min/1.73 m^2 Final     Comment:     Calculation used to obtain the estimated glomerular filtration  rate (eGFR) is the CKD-EPI equation.        Microbiology Results (last 7 days)     ** No results found for the last 168 hours. **        Significant Imaging:  CXR:   Patchy interstitial infiltrates in the mid and lower lung fields likely due to a viral pneumonitis.    CXR: Negative chest.  No appreciable infiltrate on this exam.    CXR: Mild bilateral airspace disease with distribution suspicious for edema, COVID or other atypical infection.         Assessment/Plan:      * Pneumonia due to COVID-19 virus  - COVID-19 testing   - Infection Control notified     - Isolation:   - Airborne, Contact and Droplet Precautions  - Cohort patients into COVID units  - N95 mask, wear eye protection  - 20 second hand hygiene              - Limit visitors per hospital policy              - Consolidating lab draws, nursing care, provider visits, and interventions    - Diagnostics: (leukopenia, hyponatremia, hyperferritinemia, elevated troponin, elevated d-dimer, age, and comorbidities are significant predictors of poor clinical outcome)  CBC, CMP, Procalcitonin, Ferritin, CRP, LDH, BNP, Troponin, ECG, Blood Culture x2, Portable CXR and UA and culture    - Management:  Supplemental O2 to maintain SpO2 >92%  Telemetry  Continuous/intermittent Pulse Ox  Albuterol treatment PRN  Completed Remdesivir + Tocilizumab.  Inflammation markers trending down.  Dexamethasone  FD lovenox per [facility] policy - patient counseled on risks and benefits and is agreeable.  Give intravenous fluid bolus today for  low blood pressure.  Patient is asymptomatic.  Advance Care Planning   code status:  FULL code               Seizures  Chronic condition.  Maintain seizure/fall/aspiration precautions.  Continue chronic keppra.      Acute respiratory failure with hypoxia  Patient with Hypoxic Respiratory failure which is Acute.  he is not on home oxygen. Supplemental oxygen was provided and noted-  .   Signs/symptoms of respiratory failure include- tachypnea. Contributing diagnoses includes - COVID 19 Labs and images were reviewed. Patient Has not had a recent ABG. Will treat underlying causes and adjust management of respiratory failure as follows- continue supplemental oxygen as needed.    Hyperlipidemia   Patient is chronically on statin.will continue for now. Monitor clinically. Last LDL was No results found for: LDLCALC         Transferred to medicine telemetry floor.  Discussed with patient's wife, answered all questions.  VTE Risk Mitigation (From admission, onward)         Ordered     enoxaparin injection 80 mg  2 times daily         01/05/22 0626                Discharge Planning   NEL: 1/15/22     Code Status: Full Code   Is the patient medically ready for discharge?:     Reason for patient still in hospital (select all that apply): Patient trending condition  Discharge Plan A: Long-term acute care facility (LTAC)            Critical care time spent on the evaluation and treatment of severe organ dysfunction, review of pertinent labs and imaging studies, discussions with consulting providers and discussions with patient/family: 31 minutes.      Estrella Lama MD  Department of Hospital Medicine   [facility]

## 2022-01-13 NOTE — PROGRESS NOTES
"[facility]  Adult Nutrition  Progress Note    SUMMARY    Intervention: general healthful diet    Recommendation:   1) Continue cardiac diet to promote PO intakes for now   2) weigh weekly    Goals: 1) PO Intakes > 75% of meals at f/u  Nutrition Goal Status: new  Communication of RD Recs:  (POC, sticky note)    Assessment and Plan    No nutrition diagnosis at this time     Malnutrition Assessment     Skin (Micronutrient):  (Brade = 19)   Micronutrient Evaluation: no deficiencies               Edema (Fluid Accumulation): 0-->no edema present   Subcutaneous Fat Loss (Final Summary): well nourished  Muscle Loss Evaluation (Final Summary): well nourished         Reason for Assessment    Reason For Assessment: consult  Diagnosis:  (COVID-19)  Relevant Medical History: HLD, seizures  Interdisciplinary Rounds: did not attend    General Information Comments: 56 y/o male admitted with COVID-19. Pt admitted x 8 days, tolerating regular diet, noted with good appetite. NFPE not done as pt on COVID-19 isolation precautions, visually appears well-nourished. No significant wt loss per chart review.    Nutrition Discharge Planning: To be determined- cardiac diet    Nutrition Risk Screen    Nutrition Risk Screen: no indicators present    Nutrition/Diet History    Spiritual, Cultural Beliefs, Cheondoism Practices, Values that Affect Care: no  Food Allergies: NKFA  Factors Affecting Nutritional Intake: None identified at this time    Anthropometrics    Temp: 97.7 °F (36.5 °C)  Height Method: Measured (office 21)  Height: 5' 9"  Height (inches): 69 in  Weight Method: Bed Scale  Weight: 80.6 kg (177 lb 11.1 oz)  Weight (lb): 177.69 lb  Ideal Body Weight (IBW), Male: 160 lb  % Ideal Body Weight, Male (lb): 111.06 %  BMI (Calculated): 26.2  BMI Grade: 25 - 29.9 - overweight  Usual Body Weight (UBW), k kg (21)  % Usual Body Weight: 97.31  % Weight Change From Usual Weight: -2.89 %   "     Lab/Procedures/Meds    Pertinent Labs Reviewed: reviewed  BMP  Lab Results   Component Value Date     (L) 01/11/2022    K 3.9 01/11/2022     01/11/2022    CO2 21 (L) 01/11/2022    BUN 23 (H) 01/11/2022    CREATININE 0.8 01/11/2022    CALCIUM 9.1 01/11/2022    ANIONGAP 13 01/11/2022    ESTGFRAFRICA >60 01/11/2022    EGFRNONAA >60 01/11/2022     No results for input(s): POCTGLUCOSE in the last 24 hours.  Lab Results   Component Value Date    CRP 1.8 01/13/2022       Pertinent Medications Reviewed: reviewed  Pertinent Medications Comments: dexamethasone, vitamin C, statin, MVI, zofran, Kbicarb    Estimated/Assessed Needs    Weight Used For Calorie Calculations: 80.6 kg (177 lb 11.1 oz)  Energy Calorie Requirements (kcal): MSJ ( x 1.25)= 2015 kcal  Energy Need Method: [place]  Protein Requirements: 1.2 g protein/kg ( critical care) = 96 g  Weight Used For Protein Calculations: 80.6 kg (177 lb 11.1 oz)  Fluid Requirements (mL): 2000 ml or per MD  Estimated Fluid Requirement Method: RDA Method  CHO Requirement: N/A      Nutrition Prescription Ordered    Current Diet Order: regular    Evaluation of Received Nutrient/Fluid Intake    Energy Calories Required: meeting needs  Protein Required: meeting needs  Tolerance: tolerating  % Intake of Estimated Energy Needs: %  % Meal Intake: 100%    Nutrition Risk    Level of Risk/Frequency of Follow-up: low - moderate 1 x weekly    Monitor and Evaluation    Food and Nutrient Intake: energy intake,food and beverage intake  Food and Nutrient Adminstration: diet order  Anthropometric Measurements: weight  Biochemical Data, Medical Tests and Procedures: electrolyte and renal panel, glucose, CRP  Nutrition-Focused Physical Findings: extremities, muscles and bones,overall appearance     Nutrition Follow-Up    RD Follow-up?: Yes

## 2022-01-13 NOTE — SUBJECTIVE & OBJECTIVE
Interval History: Patient seen and examined.  Appears comfortable with Vapotherm use. FiO2 75%, improving compared to last couple of days. Overnight patient has used CPAP therapy.  Wife at bedisde. Completed IV Remdesivir + Tocilizumab. On PO dexamethsone.  Blood pressure is on lower side today.  Patient is asymptomatic.    Review of Systems   Constitutional: Negative for chills and fever.   HENT: Negative for congestion and sore throat.    Respiratory: Positive for cough. Negative for shortness of breath.    Cardiovascular: Negative for chest pain and palpitations.   Gastrointestinal: Negative for abdominal pain, diarrhea, nausea and vomiting.   Genitourinary: Negative for flank pain and hematuria.   Musculoskeletal: Negative for neck pain and neck stiffness.   Skin: Negative for pallor and rash.   Neurological: Positive for headaches. Negative for weakness.   Psychiatric/Behavioral: Negative for agitation and confusion.   All other systems reviewed and are negative.    Objective:     Vital Signs (Most Recent):  Temp: 97.7 °F (36.5 °C) (01/13/22 0400)  Pulse: 64 (01/13/22 0830)  Resp: 20 (01/13/22 0830)  BP: 115/69 (01/13/22 0700)  SpO2: 96 % (01/13/22 0830) Vital Signs (24h Range):  Temp:  [97.6 °F (36.4 °C)-97.9 °F (36.6 °C)] 97.7 °F (36.5 °C)  Pulse:  [49-96] 64  Resp:  [16-36] 20  SpO2:  [83 %-100 %] 96 %  BP: ()/(56-84) 115/69     Weight: 80.6 kg (177 lb 11.1 oz)  Body mass index is 26.24 kg/m².    Intake/Output Summary (Last 24 hours) at 1/13/2022 0904  Last data filed at 1/13/2022 0619  Gross per 24 hour   Intake 551.72 ml   Output 1325 ml   Net -773.28 ml      Physical Exam  Constitutional:       General: He is not in acute distress.     Appearance: He is well-developed.   HENT:      Head: Normocephalic and atraumatic.   Eyes:      Conjunctiva/sclera: Conjunctivae normal.      Pupils: Pupils are equal, round, and reactive to light.   Cardiovascular:      Rate and Rhythm: Normal rate and regular  rhythm.      Heart sounds: Normal heart sounds.   Pulmonary:      Effort: No tachypnea.      Breath sounds: Decreased breath sounds and rales (bibasilar, L>R) present.   Abdominal:      General: Bowel sounds are normal. There is no distension.      Palpations: Abdomen is soft.      Tenderness: There is no abdominal tenderness.   Musculoskeletal:         General: Normal range of motion.      Cervical back: Normal range of motion and neck supple.   Skin:     General: Skin is warm and dry.   Neurological:      Mental Status: He is alert and oriented to person, place, and time.   Psychiatric:         Behavior: Behavior normal.         Significant Labs:   Lab Results   Component Value Date    WBC 6.95 01/11/2022    HGB 15.8 01/11/2022    HCT 46.0 01/11/2022    MCV 86 01/11/2022     01/11/2022     CMP  Sodium   Date Value Ref Range Status   01/11/2022 135 (L) 136 - 145 mmol/L Final     Potassium   Date Value Ref Range Status   01/11/2022 3.9 3.5 - 5.1 mmol/L Final     Chloride   Date Value Ref Range Status   01/11/2022 101 95 - 110 mmol/L Final     CO2   Date Value Ref Range Status   01/11/2022 21 (L) 23 - 29 mmol/L Final     Glucose   Date Value Ref Range Status   01/11/2022 112 (H) 70 - 110 mg/dL Final     BUN   Date Value Ref Range Status   01/11/2022 23 (H) 6 - 20 mg/dL Final     Creatinine   Date Value Ref Range Status   01/11/2022 0.8 0.5 - 1.4 mg/dL Final     Calcium   Date Value Ref Range Status   01/11/2022 9.1 8.7 - 10.5 mg/dL Final     Total Protein   Date Value Ref Range Status   01/11/2022 6.8 6.0 - 8.4 g/dL Final     Albumin   Date Value Ref Range Status   01/11/2022 3.0 (L) 3.5 - 5.2 g/dL Final     Total Bilirubin   Date Value Ref Range Status   01/11/2022 0.8 0.1 - 1.0 mg/dL Final     Comment:     For infants and newborns, interpretation of results should be based  on gestational age, weight and in agreement with clinical  observations.    Premature Infant recommended reference ranges:  Up to 24  hours.............<8.0 mg/dL  Up to 48 hours............<12.0 mg/dL  3-5 days..................<15.0 mg/dL  6-29 days.................<15.0 mg/dL       Alkaline Phosphatase   Date Value Ref Range Status   01/11/2022 102 55 - 135 U/L Final     AST   Date Value Ref Range Status   01/11/2022 70 (H) 10 - 40 U/L Final     ALT   Date Value Ref Range Status   01/11/2022 138 (H) 10 - 44 U/L Final     Anion Gap   Date Value Ref Range Status   01/11/2022 13 8 - 16 mmol/L Final     eGFR if    Date Value Ref Range Status   01/11/2022 >60 >60 mL/min/1.73 m^2 Final     eGFR if non    Date Value Ref Range Status   01/11/2022 >60 >60 mL/min/1.73 m^2 Final     Comment:     Calculation used to obtain the estimated glomerular filtration  rate (eGFR) is the CKD-EPI equation.        Microbiology Results (last 7 days)     ** No results found for the last 168 hours. **        Significant Imaging:  CXR:   Patchy interstitial infiltrates in the mid and lower lung fields likely due to a viral pneumonitis.    CXR: Negative chest.  No appreciable infiltrate on this exam.    CXR: Mild bilateral airspace disease with distribution suspicious for edema, COVID or other atypical infection.

## 2022-01-13 NOTE — PLAN OF CARE
01/13/22 0830   Patient Assessment/Suction   Respiratory Effort Unlabored   Expansion/Accessory Muscles/Retractions expansion symmetric;no retractions   All Lung Fields Breath Sounds Anterior:;Lateral:;diminished   Rhythm/Pattern, Respiratory depth regular;pattern regular   Cough Frequency infrequent   PRE-TX-O2   O2 Device (Oxygen Therapy) Vapotherm   $ Is the patient on High Flow Oxygen? Yes   $ Vapotherm Daily Charge Vapotherm Daily   Humidification temp set 33   Humidification temp actual 33   Flow (L/min) 35   Oxygen Concentration (%) 75   SpO2 96 %   Pulse Oximetry Type Continuous   $ Pulse Oximetry - Multiple Charge Pulse Oximetry - Multiple   Pulse 64   Resp 20   Aerosol Therapy   $ Aerosol Therapy Charges Aerosol Treatment   Respiratory Treatment Status (SVN) given   Treatment Route (SVN) in-line   Patient Position (SVN) HOB elevated   Post Treatment Assessment (SVN) breath sounds unchanged   Signs of Intolerance (SVN) none   Breath Sounds Post-Respiratory Treatment   Throughout All Fields Post-Treatment All Fields   Throughout All Fields Post-Treatment no change   Post-treatment Heart Rate (beats/min) 70   Post-treatment Resp Rate (breaths/min) 20   Incentive Spirometer   $ Incentive Spirometer Charges done with encouragement;done independently per patient   Administration (IS) self-administered   Vibratory PEP Therapy   Route (PEP Therapy) self-administered   Skin Integrity   $ Wound Care Tech Time 15 min   Area Observed Bridge of nose   Skin Appearance without discoloration   Barrier used? Liquid Filled Cushion   Ready to Wean/Extubation Screen   FIO2<=50 (chart decimal) (!) 0.75   Preset CPAP/BiPAP Settings   Mode Of Delivery Standby

## 2022-01-13 NOTE — PROGRESS NOTES
Progress Note  PULMONARY    Admit Date: 1/5/2022 01/13/2022      SUBJECTIVE:     1/8- worsening oxygenation, req 12L HFNC  1/9- on HFNC 10L this am, no fevers, VS stable  1/10- continuous cpap, sats improved, transferred to ICU for closer monitoring. Pt still denies any symptoms. Does not feel sob, cough, congestion or chest pain. Does not notice when his sats drop  1/11- on vapotherm, no new complaints  1/12- continues on vapotherm, no new issues  1/13- resp status stable with vapotherm, cpap at night      OBJECTIVE:     Vitals (Most recent):  Vitals:    01/13/22 0700   BP: 115/69   Pulse: 67   Resp: 20   Temp:        Vitals (24 hour range):  Temp:  [97.6 °F (36.4 °C)-97.9 °F (36.6 °C)]   Pulse:  [49-96]   Resp:  [16-36]   BP: ()/(56-84)   SpO2:  [83 %-100 %]       Intake/Output Summary (Last 24 hours) at 1/13/2022 0834  Last data filed at 1/13/2022 0619  Gross per 24 hour   Intake 555.86 ml   Output 1325 ml   Net -769.14 ml          Physical Exam:  The patient's neuro status (alertness,orientation,cognitive function,motor skills,), pharyngeal exam (oral lesions, hygiene, abn dentition,), Neck (jvd,mass,thyroid,nodes in neck and above/below clavicle),RESPIRATORY(symmetry,effort,fremitus,percussion,auscultation),  Cor(rhythm,heart tones including gallops,perfusion,edema)ABD(distention,hepatic&splenomegaly,tenderness,masses), Skin(rash,cyanosis),Psyc(affect,judgement,).  Exam negative except for these pertinent findings:    No distress, awake and alert  Scattered rales bilaterally  No edema     Radiographs reviewed: view by direct vision   CXR 1/10- mild progression bilateral infiltrates  CXR 1/4/22- very mild pnuemonitis bilat bases    Labs     Results for ERIN MARTINEZ JR. (MRN [id number]) as of 1/10/2022 13:21   Ref. Range 1/5/2022 07:24 1/7/2022 05:13 1/10/2022 11:15   Ferritin Latest Ref Range: 20.0 - 300.0 ng/mL 2,439 (H) 1,500 (H) 1,525 (H)     No results for input(s): WBC, HGB, HCT, PLT, BAND,  METAMYELOCYT, MYELOPCT, HGBA1C in the last 24 hours.  No results for input(s): NA, K, CL, CO2, BUN, CREATININE, GLU, CALCIUM, CAION, MG, PHOS, AST, ALT, ALKPHOS, BILITOT, BILIDIR, PROT, ALBUMIN, PREALBUMIN, AMYLASE, LIPASE, CRP, HSCRP, SEDRATE, PROCAL, INR, PTT, LABHEPA, LACTATE, TROPONINI, CPK, CPKMB, MB, BNP in the last 24 hours.No results for input(s): PH, PCO2, PO2, HCO3 in the last 24 hours.  Microbiology Results (last 7 days)     ** No results found for the last 168 hours. **          Impression:  Active Hospital Problems    Diagnosis  POA    *Pneumonia due to COVID-19 virus [U07.1, J12.82]  Yes    Acute respiratory failure with hypoxia [J96.01]  Yes    Seizures [R56.9]  Yes    Hyperlipidemia [E78.5]  Yes      Resolved Hospital Problems   No resolved problems to display.               Plan:     - continue supplemental O2 to keep sats >92%. cpap as needed  - continue inhaled bronchodilators  - incentive spirometry   - completed RDV  - continue decadron 6mg daily  - received tocilizumab  - monitor inflammatory markers  - continue empiric therapeutic lovenox  - needs to prone  - up in chair  - continue PT  - transfer out of ICU  - stop precedex  - use atarax prn anxiety        Soila Choi MD  Pulmonary & Critical Care Medicine

## 2022-01-13 NOTE — PLAN OF CARE
Patient is alert and oriented x4, vital signs stable, afebrile, CPAP overnight, no desaturation episodes, on precedex drip, right forearm PIV in place, uses urinal at bedside, denies any pain or distress, educated on breathing exercise for anxiety, needs assessed, safety sweep done, swallowed pills whole with sips of water, will continue to monitor.

## 2022-01-13 NOTE — PLAN OF CARE
Intervention: general healthful diet    Recommendation:   1) Continue cardiac diet to promote PO intakes for now   2) weigh weekly    Goals: 1) PO Intakes > 75% of meals at f/u  Nutrition Goal Status: new  Communication of RD Recs:  (POC, sticky note)

## 2022-01-14 LAB
ANION GAP SERPL CALC-SCNC: 13 MMOL/L (ref 8–16)
BASOPHILS # BLD AUTO: 0.01 K/UL (ref 0–0.2)
BASOPHILS NFR BLD: 0.1 % (ref 0–1.9)
BUN SERPL-MCNC: 14 MG/DL (ref 6–20)
CALCIUM SERPL-MCNC: 8.5 MG/DL (ref 8.7–10.5)
CHLORIDE SERPL-SCNC: 104 MMOL/L (ref 95–110)
CO2 SERPL-SCNC: 21 MMOL/L (ref 23–29)
CREAT SERPL-MCNC: 0.8 MG/DL (ref 0.5–1.4)
DIFFERENTIAL METHOD: ABNORMAL
EOSINOPHIL # BLD AUTO: 0.1 K/UL (ref 0–0.5)
EOSINOPHIL NFR BLD: 0.8 % (ref 0–8)
ERYTHROCYTE [DISTWIDTH] IN BLOOD BY AUTOMATED COUNT: 12 % (ref 11.5–14.5)
EST. GFR  (AFRICAN AMERICAN): >60 ML/MIN/1.73 M^2
EST. GFR  (NON AFRICAN AMERICAN): >60 ML/MIN/1.73 M^2
GLUCOSE SERPL-MCNC: 107 MG/DL (ref 70–110)
HCT VFR BLD AUTO: 41.1 % (ref 40–54)
HGB BLD-MCNC: 14.4 G/DL (ref 14–18)
IMM GRANULOCYTES # BLD AUTO: 0.15 K/UL (ref 0–0.04)
IMM GRANULOCYTES NFR BLD AUTO: 1.2 % (ref 0–0.5)
LYMPHOCYTES # BLD AUTO: 1.1 K/UL (ref 1–4.8)
LYMPHOCYTES NFR BLD: 9.1 % (ref 18–48)
MCH RBC QN AUTO: 29.4 PG (ref 27–31)
MCHC RBC AUTO-ENTMCNC: 35 G/DL (ref 32–36)
MCV RBC AUTO: 84 FL (ref 82–98)
MONOCYTES # BLD AUTO: 0.6 K/UL (ref 0.3–1)
MONOCYTES NFR BLD: 5.2 % (ref 4–15)
NEUTROPHILS # BLD AUTO: 10.1 K/UL (ref 1.8–7.7)
NEUTROPHILS NFR BLD: 83.6 % (ref 38–73)
NRBC BLD-RTO: 0 /100 WBC
PLATELET # BLD AUTO: 432 K/UL (ref 150–450)
PMV BLD AUTO: 8.7 FL (ref 9.2–12.9)
POTASSIUM SERPL-SCNC: 3.5 MMOL/L (ref 3.5–5.1)
RBC # BLD AUTO: 4.9 M/UL (ref 4.6–6.2)
SODIUM SERPL-SCNC: 138 MMOL/L (ref 136–145)
WBC # BLD AUTO: 12.06 K/UL (ref 3.9–12.7)

## 2022-01-14 PROCEDURE — 94640 AIRWAY INHALATION TREATMENT: CPT

## 2022-01-14 PROCEDURE — 94761 N-INVAS EAR/PLS OXIMETRY MLT: CPT

## 2022-01-14 PROCEDURE — 94664 DEMO&/EVAL PT USE INHALER: CPT

## 2022-01-14 PROCEDURE — 27100171 HC OXYGEN HIGH FLOW UP TO 24 HOURS

## 2022-01-14 PROCEDURE — 99233 PR SUBSEQUENT HOSPITAL CARE,LEVL III: ICD-10-PCS | Mod: ,,, | Performed by: INTERNAL MEDICINE

## 2022-01-14 PROCEDURE — 25500020 PHARM REV CODE 255

## 2022-01-14 PROCEDURE — 25000242 PHARM REV CODE 250 ALT 637 W/ HCPCS: Performed by: INTERNAL MEDICINE

## 2022-01-14 PROCEDURE — 27000207 HC ISOLATION

## 2022-01-14 PROCEDURE — 85025 COMPLETE CBC W/AUTO DIFF WBC: CPT | Performed by: INTERNAL MEDICINE

## 2022-01-14 PROCEDURE — 80048 BASIC METABOLIC PNL TOTAL CA: CPT | Performed by: INTERNAL MEDICINE

## 2022-01-14 PROCEDURE — 99233 SBSQ HOSP IP/OBS HIGH 50: CPT | Mod: ,,, | Performed by: INTERNAL MEDICINE

## 2022-01-14 PROCEDURE — 25000003 PHARM REV CODE 250: Performed by: INTERNAL MEDICINE

## 2022-01-14 PROCEDURE — 94799 UNLISTED PULMONARY SVC/PX: CPT

## 2022-01-14 PROCEDURE — 99900035 HC TECH TIME PER 15 MIN (STAT)

## 2022-01-14 PROCEDURE — 63600175 PHARM REV CODE 636 W HCPCS: Performed by: INTERNAL MEDICINE

## 2022-01-14 PROCEDURE — 97530 THERAPEUTIC ACTIVITIES: CPT

## 2022-01-14 PROCEDURE — 94660 CPAP INITIATION&MGMT: CPT

## 2022-01-14 PROCEDURE — 12000002 HC ACUTE/MED SURGE SEMI-PRIVATE ROOM

## 2022-01-14 PROCEDURE — 36415 COLL VENOUS BLD VENIPUNCTURE: CPT | Performed by: INTERNAL MEDICINE

## 2022-01-14 RX ADMIN — DEXAMETHASONE 6 MG: 4 TABLET ORAL at 10:01

## 2022-01-14 RX ADMIN — FLUTICASONE PROPIONATE 100 MCG: 50 SPRAY, METERED NASAL at 10:01

## 2022-01-14 RX ADMIN — MUPIROCIN: 20 OINTMENT TOPICAL at 10:01

## 2022-01-14 RX ADMIN — OXYCODONE HYDROCHLORIDE AND ACETAMINOPHEN 500 MG: 500 TABLET ORAL at 10:01

## 2022-01-14 RX ADMIN — IPRATROPIUM BROMIDE AND ALBUTEROL SULFATE 3 ML: 2.5; .5 SOLUTION RESPIRATORY (INHALATION) at 12:01

## 2022-01-14 RX ADMIN — IPRATROPIUM BROMIDE AND ALBUTEROL SULFATE 3 ML: 2.5; .5 SOLUTION RESPIRATORY (INHALATION) at 07:01

## 2022-01-14 RX ADMIN — ENOXAPARIN SODIUM 80 MG: 80 INJECTION SUBCUTANEOUS at 10:01

## 2022-01-14 RX ADMIN — HYDROXYZINE HYDROCHLORIDE 25 MG: 25 TABLET, FILM COATED ORAL at 12:01

## 2022-01-14 RX ADMIN — THERA TABS 1 TABLET: TAB at 10:01

## 2022-01-14 RX ADMIN — LEVETIRACETAM 500 MG: 500 TABLET, FILM COATED ORAL at 10:01

## 2022-01-14 RX ADMIN — GUAIFENESIN 600 MG: 600 TABLET, EXTENDED RELEASE ORAL at 10:01

## 2022-01-14 RX ADMIN — CETIRIZINE HYDROCHLORIDE 10 MG: 10 TABLET, FILM COATED ORAL at 10:01

## 2022-01-14 RX ADMIN — IOHEXOL 75 ML: 350 INJECTION, SOLUTION INTRAVENOUS at 05:01

## 2022-01-14 RX ADMIN — ATORVASTATIN CALCIUM 20 MG: 20 TABLET, FILM COATED ORAL at 10:01

## 2022-01-14 NOTE — PLAN OF CARE
[facility]  Discharge Reassessment    Primary Care Provider: Dario Ayon MD    Expected Discharge Date:      Per MDRs, Vapotherm use. FiO2 65%, improving compared to last couple of days. Overnight patient has used CPAP therapy.  CM following for discharge planning needs.    Reassessment (most recent)     Discharge Reassessment - 01/14/22 1459        Discharge Reassessment    Assessment Type Discharge Planning Reassessment     Did the patient's condition or plan change since previous assessment? Yes     Discharge Plan discussed with: Patient     Communicated NEL with patient/caregiver Date not available/Unable to determine     Discharge Plan A Home     Discharge Plan B Home with family     DME Needed Upon Discharge  oxygen     Discharge Barriers Identified None     Why the patient remains in the hospital Requires continued medical care

## 2022-01-14 NOTE — PROGRESS NOTES
Progress Note  PULMONARY    Admit Date: 1/5/2022 01/14/2022      From Dr Choi's consult :  History of Present Illness:  Pt is a 57 yo male with COVID-19 pneumonia. He denies any pmh or hx lung disease. States he came into ED because sats were low but doesn't have any complaints other than some chest tightness when he takes a deep breath. Today his O2 requirement went up to 8L. Wife at bedside assists w/ history.       SUBJECTIVE:     1/8- worsening oxygenation, req 12L HFNC  1/9- on HFNC 10L this am, no fevers, VS stable  1/10- continuous cpap, sats improved, transferred to ICU for closer monitoring. Pt still denies any symptoms. Does not feel sob, cough, congestion or chest pain. Does not notice when his sats drop  1/11- on vapotherm, no new complaints  1/12- continues on vapotherm, no new issues  1/13- resp status stable with vapotherm, cpap at night  1/14 Above from Dr Choi and below from Dr Schwab:  No new c/o      OBJECTIVE:     Vitals (Most recent):  Vitals:    01/14/22 1222   BP:    Pulse: 89   Resp: (!) 22   Temp:        Vitals (24 hour range):  Temp:  [97.6 °F (36.4 °C)-98.8 °F (37.1 °C)]   Pulse:  [61-99]   Resp:  [18-37]   BP: (119-158)/(69-86)   SpO2:  [89 %-100 %]     No intake or output data in the 24 hours ending 01/14/22 1417       Physical Exam:  The patient's neuro status (alertness,orientation,cognitive function,motor skills,), pharyngeal exam (oral lesions, hygiene, abn dentition,), Neck (jvd,mass,thyroid,nodes in neck and above/below clavicle),RESPIRATORY(symmetry,effort,fremitus,percussion,auscultation),  Cor(rhythm,heart tones including gallops,perfusion,edema)ABD(distention,hepatic&splenomegaly,tenderness,masses), Skin(rash,cyanosis),Psyc(affect,judgement,).  Exam negative except for these pertinent findings:    No distress, awake and alert  Scattered rales bilaterally  No edema     Radiographs reviewed: view by direct vision   CXR 1/10- mild progression bilateral infiltrates  CXR 1/4/22-  very mild pnuemonitis bilat bases    Labs     Results for ERIN MARTINEZ JR. (MRN [id number]) as of 1/10/2022 13:21   Ref. Range 1/5/2022 07:24 1/7/2022 05:13 1/10/2022 11:15   Ferritin Latest Ref Range: 20.0 - 300.0 ng/mL 2,439 (H) 1,500 (H) 1,525 (H)     Recent Labs   Lab 01/14/22  0437   WBC 12.06   HGB 14.4   HCT 41.1        Recent Labs   Lab 01/14/22  0437      K 3.5      CO2 21*   BUN 14   CREATININE 0.8      CALCIUM 8.5*   No results for input(s): PH, PCO2, PO2, HCO3 in the last 24 hours.  Microbiology Results (last 7 days)     ** No results found for the last 168 hours. **          Impression:  Active Hospital Problems    Diagnosis  POA    *Pneumonia due to COVID-19 virus [U07.1, J12.82]  Yes    Acute respiratory failure with hypoxia [J96.01]  Yes    Seizures [R56.9]  Yes    Hyperlipidemia [E78.5]  Yes      Resolved Hospital Problems   No resolved problems to display.               Plan:     - continue supplemental O2 to keep sats >92%. cpap as needed  - continue inhaled bronchodilators  - incentive spirometry   - completed RDV  - continue decadron 6mg daily  - received tocilizumab  - monitor inflammatory markers  - continue empiric therapeutic lovenox  - needs to prone  - up in chair  - continue PT  - transfer out of ICU  - stop precedex  - use atarax prn anxiety    Above from Dr Choi and below from Dr Schwab:  1/14 no fever,vss, decadron 6/d day 11,    65% ox needs now from 80% on 11th, crp 1.8 on 13th,  cxr fairly clear on 10th- only mild dz , earlier cxr also near clear, on therapeutic Lovenox.    Might consider cta given clear cxrs?  Discussed with pt-- would rec 3 months anticoagg if pe seen- otherwise stop anticoagg at NC.  Will check cta today.  Discussed with wife also  cta viewed directly, no large central clots but may have small peripheral clots to my view best seen in coronal views-- report says no pe to segmental level.  There is densely opacified lung in the  periphery with more nl central areas likely accounting for normal looking cxr.  Stopping anticoagg at dc reasonable give tiny size.

## 2022-01-14 NOTE — PT/OT/SLP PROGRESS
Physical Therapy Treatment    Patient Name:  Stefano Donahue Jr.   MRN:  [id number]    Recommendations:     Discharge Recommendations:  home   Discharge Equipment Recommendations: none   Barriers to discharge: None    Assessment:     Stefano Donahue Jr. is a 57 y.o. male admitted with a medical diagnosis of Pneumonia due to COVID-19 virus.  He presents with the following impairments/functional limitations:  weakness,impaired endurance,impaired cardiopulmonary response to activity .  Patient agreeable to PT but was on VAPOTHERM with a short extension so could not walk far.  Patient presented sitting in chair at bedside and was able to stand with independence and transfer to EOB to allow chair to be replace with recliner chair and the patient transferred to recliner with independence. Patient's O2 sats went below 88% with each transfer.  Patient needs to continue with therapy QD5 to work on gait when able.    Rehab Prognosis: Good; patient would benefit from acute skilled PT services to address these deficits and reach maximum level of function.    Recent Surgery: * No surgery found *      Plan:     During this hospitalization, patient to be seen 5 x/week to address the identified rehab impairments via gait training,therapeutic activities,therapeutic exercises and progress toward the following goals:    · Plan of Care Expires:  02/16/22    Subjective     Chief Complaint: fatigue  Patient/Family Comments/goals: go home  Pain/Comfort:  ·        Objective:     Communicated with nurse prior to session.  Patient found supine with oxygen,telemetry upon PT entry to room.     General Precautions: Standard, airborne,contact,droplet,fall   Orthopedic Precautions:N/A   Braces:    Respiratory Status: High flow, flow 35 L/min, concentration 65%     Functional Mobility:  · Transfers:     · Sit to Stand:  independence with no AD  · Bed to Chair: independence with  no AD  using  Step Transfer      AM-PAC 6 CLICK MOBILITY           Therapeutic Activities and Exercises:   transfer training sit to stand ind, chair to EOB with no AD with SBA/ind,   Standing tolerance    Patient left up in chair with call button in reach, nurse notified and spouse present..    GOALS:   Multidisciplinary Problems     Physical Therapy Goals        Problem: Physical Therapy Goal    Goal Priority Disciplines Outcome Goal Variances Interventions   Physical Therapy Goal     PT, PT/OT Ongoing, Progressing     Description: Goals to be met by: 22    Patient will increase functional independence with mobility by performin. Supine to sit with Slater  2. Sit to supine with Slater  3. Sit to stand transfer with Slater  4. Bed to chair transfer with Slater using No Assistive Device  5. Gait  x 150 feet with Slater using No Assistive Device.                      Time Tracking:     PT Received On: 22  PT Start Time: 946     PT Stop Time: 958  PT Total Time (min): 12 min     Billable Minutes: Therapeutic Activity 12    Treatment Type: Treatment  PT/PTA: PT     PTA Visit Number: 0     2022

## 2022-01-14 NOTE — SUBJECTIVE & OBJECTIVE
Interval History: Patient seen and examined.  Appears comfortable with Vapotherm use. FiO2 65%, improving compared to last couple of days. Overnight patient has used CPAP therapy.  Wife at bedisde. Completed IV Remdesivir + Tocilizumab. On PO dexamethsone.  In good spirits.    Review of Systems   Constitutional: Negative for chills and fever.   HENT: Negative for congestion and sore throat.    Respiratory: Positive for cough. Negative for shortness of breath.    Cardiovascular: Negative for chest pain and palpitations.   Gastrointestinal: Negative for abdominal pain, diarrhea, nausea and vomiting.   Genitourinary: Negative for flank pain and hematuria.   Musculoskeletal: Negative for neck pain and neck stiffness.   Skin: Negative for pallor and rash.   Neurological: Positive for headaches. Negative for weakness.   Psychiatric/Behavioral: Negative for agitation and confusion.   All other systems reviewed and are negative.    Objective:     Vital Signs (Most Recent):  Temp: 98.4 °F (36.9 °C) (01/14/22 0831)  Pulse: 86 (01/14/22 0831)  Resp: (!) 24 (01/14/22 0831)  BP: 119/69 (01/14/22 0831)  SpO2: (!) 90 % (01/14/22 0831) Vital Signs (24h Range):  Temp:  [97.6 °F (36.4 °C)-98.8 °F (37.1 °C)] 98.4 °F (36.9 °C)  Pulse:  [] 86  Resp:  [18-37] 24  SpO2:  [89 %-100 %] 90 %  BP: (117-158)/(69-86) 119/69     Weight: 80.6 kg (177 lb 11.1 oz)  Body mass index is 26.24 kg/m².  No intake or output data in the 24 hours ending 01/14/22 0922   Physical Exam  Constitutional:       General: He is not in acute distress.     Appearance: He is well-developed.   HENT:      Head: Normocephalic and atraumatic.   Eyes:      Conjunctiva/sclera: Conjunctivae normal.      Pupils: Pupils are equal, round, and reactive to light.   Cardiovascular:      Rate and Rhythm: Normal rate and regular rhythm.      Heart sounds: Normal heart sounds.   Pulmonary:      Effort: No tachypnea.      Breath sounds: Decreased breath sounds and rales  (bibasilar, L>R) present.   Abdominal:      General: Bowel sounds are normal. There is no distension.      Palpations: Abdomen is soft.      Tenderness: There is no abdominal tenderness.   Musculoskeletal:         General: Normal range of motion.      Cervical back: Normal range of motion and neck supple.   Skin:     General: Skin is warm and dry.   Neurological:      Mental Status: He is alert and oriented to person, place, and time.   Psychiatric:         Behavior: Behavior normal.         Significant Labs:   Lab Results   Component Value Date    WBC 12.06 01/14/2022    HGB 14.4 01/14/2022    HCT 41.1 01/14/2022    MCV 84 01/14/2022     01/14/2022     CMP  Sodium   Date Value Ref Range Status   01/14/2022 138 136 - 145 mmol/L Final     Potassium   Date Value Ref Range Status   01/14/2022 3.5 3.5 - 5.1 mmol/L Final     Chloride   Date Value Ref Range Status   01/14/2022 104 95 - 110 mmol/L Final     CO2   Date Value Ref Range Status   01/14/2022 21 (L) 23 - 29 mmol/L Final     Glucose   Date Value Ref Range Status   01/14/2022 107 70 - 110 mg/dL Final     BUN   Date Value Ref Range Status   01/14/2022 14 6 - 20 mg/dL Final     Creatinine   Date Value Ref Range Status   01/14/2022 0.8 0.5 - 1.4 mg/dL Final     Calcium   Date Value Ref Range Status   01/14/2022 8.5 (L) 8.7 - 10.5 mg/dL Final     Total Protein   Date Value Ref Range Status   01/11/2022 6.8 6.0 - 8.4 g/dL Final     Albumin   Date Value Ref Range Status   01/11/2022 3.0 (L) 3.5 - 5.2 g/dL Final     Total Bilirubin   Date Value Ref Range Status   01/11/2022 0.8 0.1 - 1.0 mg/dL Final     Comment:     For infants and newborns, interpretation of results should be based  on gestational age, weight and in agreement with clinical  observations.    Premature Infant recommended reference ranges:  Up to 24 hours.............<8.0 mg/dL  Up to 48 hours............<12.0 mg/dL  3-5 days..................<15.0 mg/dL  6-29 days.................<15.0 mg/dL        Alkaline Phosphatase   Date Value Ref Range Status   01/11/2022 102 55 - 135 U/L Final     AST   Date Value Ref Range Status   01/11/2022 70 (H) 10 - 40 U/L Final     ALT   Date Value Ref Range Status   01/11/2022 138 (H) 10 - 44 U/L Final     Anion Gap   Date Value Ref Range Status   01/14/2022 13 8 - 16 mmol/L Final     eGFR if    Date Value Ref Range Status   01/14/2022 >60 >60 mL/min/1.73 m^2 Final     eGFR if non    Date Value Ref Range Status   01/14/2022 >60 >60 mL/min/1.73 m^2 Final     Comment:     Calculation used to obtain the estimated glomerular filtration  rate (eGFR) is the CKD-EPI equation.        Microbiology Results (last 7 days)     ** No results found for the last 168 hours. **        Significant Imaging:  CXR:   Patchy interstitial infiltrates in the mid and lower lung fields likely due to a viral pneumonitis.    CXR: Negative chest.  No appreciable infiltrate on this exam.    CXR: Mild bilateral airspace disease with distribution suspicious for edema, COVID or other atypical infection.

## 2022-01-14 NOTE — PLAN OF CARE
Patient AAO, VSS. Telemetry 8688 monitored; SR throughout shift. Vapotherm 35L/ 60% FiO2 and CPAP for sleep utilized. PRN sleep aid given. Pt verbalized understanding of POC. Purposeful hourly/q2hr rounding done during shift to promote patient safety. Patient free from falls and injury during shift.  Bed in lowest position, brakes locked, and call light within reach.  Will continue to monitor.

## 2022-01-14 NOTE — PLAN OF CARE
Patient still on vapotherm, discharge date TBD.       01/14/22 1152   Discharge Reassessment   Assessment Type Discharge Planning Reassessment   Discharge Plan A Home Health   Discharge Plan B Home with family

## 2022-01-14 NOTE — NURSING
Pt received from ICU, NAD noted, on .65 vapotherm at 30 lpm, wife at bedside, pt AA&O, bed low & locked, call bell in reach, no resp distress noted.

## 2022-01-14 NOTE — PROGRESS NOTES
[facility] Hospital Medicine  Progress Note    Patient Name: Stefano Donahue Jr.  MRN: [id number]  Patient Class: IP- Inpatient   Admission Date: 1/5/2022  Length of Stay: 9 days  Attending Physician: Estrella Lama MD  Primary Care Provider: Dario Ayon MD        Subjective:     Principal Problem:Pneumonia due to COVID-19 virus        HPI:  Stefano Donahue Jr. Is a 55 y/o male with a past medical history significant for HLD and seizures who presented to the ED for further evaluation of hypoxia.  He was diagnosed with COVID-19 one week ago.  His wife purchased a pulse ox in order to check the patient's oxygen saturation and today was noted to have a sat of 88%.  He was evaluated in the urgent care and thought to have pneumonia so he was sent to the ED.  During my interview, the patient's oxygen saturation was noted at 88-89% on room air and he was placed on supplemental oxygen.  Denies feeling SOB or chest pain, no nausea or vomiting.  He is placed in observation under the service of hospital medicine for continued medical management.         Overview/Hospital Course:  56-year-old male with history of hyperlipidemia and seizures admitted to the hospital medicine service with hypoxic respiratory failure secondary to COVID He was placed on supplemental oxygen, p.o. dexamethasone, Remdesivir and full-dose Lovenox.  Progressively patient required higher quantities of supplemental oxygen.  Patient received intravenous tocilizumab.  Pulmonary service was consulted.  Later on patient required transfer to intensive care unit for closer monitoring and use of noninvasive ventilatory therapy.  In intensive care unit, patient is maintained on Vapotherm oxygen which is slowly being weaned.  Patient is transferred back to medicine telemetry floor for further oxygen weaning.      Interval History: Patient seen and examined.  Appears comfortable with Vapotherm use. FiO2 65%, improving compared to last couple of  days. Overnight patient has used CPAP therapy.  [facility]. Completed IV Remdesivir + Tocilizumab. On PO dexamethsone.  In good spirits.    Review of Systems   Constitutional: Negative for chills and fever.   HENT: Negative for congestion and sore throat.    Respiratory: Positive for cough. Negative for shortness of breath.    Cardiovascular: Negative for chest pain and palpitations.   Gastrointestinal: Negative for abdominal pain, diarrhea, nausea and vomiting.   Genitourinary: Negative for flank pain and hematuria.   Musculoskeletal: Negative for neck pain and neck stiffness.   Skin: Negative for pallor and rash.   Neurological: Positive for headaches. Negative for weakness.   Psychiatric/Behavioral: Negative for agitation and confusion.   All other systems reviewed and are negative.    Objective:     Vital Signs (Most Recent):  Temp: 98.4 °F (36.9 °C) (01/14/22 0831)  Pulse: 86 (01/14/22 0831)  Resp: (!) 24 (01/14/22 0831)  BP: 119/69 (01/14/22 0831)  SpO2: (!) 90 % (01/14/22 0831) Vital Signs (24h Range):  Temp:  [97.6 °F (36.4 °C)-98.8 °F (37.1 °C)] 98.4 °F (36.9 °C)  Pulse:  [] 86  Resp:  [18-37] 24  SpO2:  [89 %-100 %] 90 %  BP: (117-158)/(69-86) 119/69     Weight: 80.6 kg (177 lb 11.1 oz)  Body mass index is 26.24 kg/m².  No intake or output data in the 24 hours ending 01/14/22 0922   Physical Exam  Constitutional:       General: He is not in acute distress.     Appearance: He is well-developed.   HENT:      Head: Normocephalic and atraumatic.   Eyes:      Conjunctiva/sclera: Conjunctivae normal.      Pupils: Pupils are equal, round, and reactive to light.   Cardiovascular:      Rate and Rhythm: Normal rate and regular rhythm.      Heart sounds: Normal heart sounds.   Pulmonary:      Effort: No tachypnea.      Breath sounds: Decreased breath sounds and rales (bibasilar, L>R) present.   Abdominal:      General: Bowel sounds are normal. There is no distension.      Palpations: Abdomen is soft.       Tenderness: There is no abdominal tenderness.   Musculoskeletal:         General: Normal range of motion.      Cervical back: Normal range of motion and neck supple.   Skin:     General: Skin is warm and dry.   Neurological:      Mental Status: He is alert and oriented to person, place, and time.   Psychiatric:         Behavior: Behavior normal.         Significant Labs:   Lab Results   Component Value Date    WBC 12.06 01/14/2022    HGB 14.4 01/14/2022    HCT 41.1 01/14/2022    MCV 84 01/14/2022     01/14/2022     CMP  Sodium   Date Value Ref Range Status   01/14/2022 138 136 - 145 mmol/L Final     Potassium   Date Value Ref Range Status   01/14/2022 3.5 3.5 - 5.1 mmol/L Final     Chloride   Date Value Ref Range Status   01/14/2022 104 95 - 110 mmol/L Final     CO2   Date Value Ref Range Status   01/14/2022 21 (L) 23 - 29 mmol/L Final     Glucose   Date Value Ref Range Status   01/14/2022 107 70 - 110 mg/dL Final     BUN   Date Value Ref Range Status   01/14/2022 14 6 - 20 mg/dL Final     Creatinine   Date Value Ref Range Status   01/14/2022 0.8 0.5 - 1.4 mg/dL Final     Calcium   Date Value Ref Range Status   01/14/2022 8.5 (L) 8.7 - 10.5 mg/dL Final     Total Protein   Date Value Ref Range Status   01/11/2022 6.8 6.0 - 8.4 g/dL Final     Albumin   Date Value Ref Range Status   01/11/2022 3.0 (L) 3.5 - 5.2 g/dL Final     Total Bilirubin   Date Value Ref Range Status   01/11/2022 0.8 0.1 - 1.0 mg/dL Final     Comment:     For infants and newborns, interpretation of results should be based  on gestational age, weight and in agreement with clinical  observations.    Premature Infant recommended reference ranges:  Up to 24 hours.............<8.0 mg/dL  Up to 48 hours............<12.0 mg/dL  3-5 days..................<15.0 mg/dL  6-29 days.................<15.0 mg/dL       Alkaline Phosphatase   Date Value Ref Range Status   01/11/2022 102 55 - 135 U/L Final     AST   Date Value Ref Range Status   01/11/2022 70  (H) 10 - 40 U/L Final     ALT   Date Value Ref Range Status   01/11/2022 138 (H) 10 - 44 U/L Final     Anion Gap   Date Value Ref Range Status   01/14/2022 13 8 - 16 mmol/L Final     eGFR if    Date Value Ref Range Status   01/14/2022 >60 >60 mL/min/1.73 m^2 Final     eGFR if non    Date Value Ref Range Status   01/14/2022 >60 >60 mL/min/1.73 m^2 Final     Comment:     Calculation used to obtain the estimated glomerular filtration  rate (eGFR) is the CKD-EPI equation.        Microbiology Results (last 7 days)     ** No results found for the last 168 hours. **        Significant Imaging:  CXR:   Patchy interstitial infiltrates in the mid and lower lung fields likely due to a viral pneumonitis.    CXR: Negative chest.  No appreciable infiltrate on this exam.    CXR: Mild bilateral airspace disease with distribution suspicious for edema, COVID or other atypical infection.         Assessment/Plan:      * Pneumonia due to COVID-19 virus  - COVID-19 testing   - Infection Control notified     - Isolation:   - Airborne, Contact and Droplet Precautions  - Cohort patients into COVID units  - N95 mask, wear eye protection  - 20 second hand hygiene              - Limit visitors per hospital policy              - Consolidating lab draws, nursing care, provider visits, and interventions    - Diagnostics: (leukopenia, hyponatremia, hyperferritinemia, elevated troponin, elevated d-dimer, age, and comorbidities are significant predictors of poor clinical outcome)  CBC, CMP, Procalcitonin, Ferritin, CRP, LDH, BNP, Troponin, ECG, Blood Culture x2, Portable CXR and UA and culture    - Management:  Supplemental O2 to maintain SpO2 >92%  Telemetry  Continuous/intermittent Pulse Ox  Albuterol treatment PRN  Completed Remdesivir + Tocilizumab.  Inflammation markers trending down.  Dexamethasone  FD lovenox per [facility] policy - patient counseled on risks and benefits and is agreeable.  Give intravenous fluid  bolus today for low blood pressure.  Patient is asymptomatic.  Advance Care Planning   code status:  FULL code               Seizures  Chronic condition.  Maintain seizure/fall/aspiration precautions.  Continue chronic keppra.      Acute respiratory failure with hypoxia  Patient with Hypoxic Respiratory failure which is Acute.  he is not on home oxygen. Supplemental oxygen was provided and noted-  .   Signs/symptoms of respiratory failure include- tachypnea. Contributing diagnoses includes - COVID 19 Labs and images were reviewed. Patient Has not had a recent ABG. Will treat underlying causes and adjust management of respiratory failure as follows- continue supplemental oxygen as needed.    Hyperlipidemia   Patient is chronically on statin.will continue for now. Monitor clinically. Last LDL was No results found for: LDLCALC         Discussed with patient's wife, encourage patient to increase ambulation and continue aggressive incentive spirometry.  VTE Risk Mitigation (From admission, onward)         Ordered     enoxaparin injection 80 mg  2 times daily         01/05/22 0626                Discharge Planning   NEL: 1/16/22     Code Status: Full Code   Is the patient medically ready for discharge?:     Reason for patient still in hospital (select all that apply): Patient trending condition and Consult recommendations  Discharge Plan A: Home Health                  Estrella Lama MD  Department of Hospital Medicine   [facility]

## 2022-01-14 NOTE — PLAN OF CARE
01/14/22 0745   Patient Assessment/Suction   Level of Consciousness (AVPU) alert   Respiratory Effort Unlabored;Normal   Expansion/Accessory Muscles/Retractions no use of accessory muscles;no retractions   All Lung Fields Breath Sounds diminished;crackles, fine   Rhythm/Pattern, Respiratory unlabored;pattern regular;depth regular;no shortness of breath reported   PRE-TX-O2   O2 Device (Oxygen Therapy) Vapotherm   $ Is the patient on High Flow Oxygen? Yes   Humidification temp set 33   Humidification temp actual 33   Flow (L/min) 35   Oxygen Concentration (%) 65   SpO2 96 %   Pulse Oximetry Type Continuous   $ Pulse Oximetry - Multiple Charge Pulse Oximetry - Multiple   Pulse 66   Resp 20   Aerosol Therapy   $ Aerosol Therapy Charges Aerosol Treatment   Daily Review of Necessity (SVN) completed   Respiratory Treatment Status (SVN) given   Treatment Route (SVN) in-line   Patient Position (SVN) HOB elevated   Post Treatment Assessment (SVN) breath sounds unchanged   Signs of Intolerance (SVN) none   Ready to Wean/Extubation Screen   FIO2<=50 (chart decimal) (!) 0.65

## 2022-01-15 ENCOUNTER — OUTSIDE PLACE OF SERVICE (OUTPATIENT)
Dept: PULMONOLOGY | Facility: CLINIC | Age: 57
End: 2022-01-15

## 2022-01-15 DIAGNOSIS — J12.82 PNEUMONIA DUE TO COVID-19 VIRUS: ICD-10-CM

## 2022-01-15 DIAGNOSIS — U07.1 PNEUMONIA DUE TO COVID-19 VIRUS: ICD-10-CM

## 2022-01-15 DIAGNOSIS — J96.01 ACUTE RESPIRATORY FAILURE WITH HYPOXIA: ICD-10-CM

## 2022-01-15 LAB
CRP SERPL-MCNC: 0.8 MG/L (ref 0–8.2)
D DIMER PPP IA.FEU-MCNC: 2.53 MG/L FEU

## 2022-01-15 PROCEDURE — 94640 AIRWAY INHALATION TREATMENT: CPT

## 2022-01-15 PROCEDURE — 86140 C-REACTIVE PROTEIN: CPT | Performed by: INTERNAL MEDICINE

## 2022-01-15 PROCEDURE — 36415 COLL VENOUS BLD VENIPUNCTURE: CPT | Performed by: INTERNAL MEDICINE

## 2022-01-15 PROCEDURE — 27000207 HC ISOLATION

## 2022-01-15 PROCEDURE — 25000003 PHARM REV CODE 250: Performed by: INTERNAL MEDICINE

## 2022-01-15 PROCEDURE — 99900035 HC TECH TIME PER 15 MIN (STAT)

## 2022-01-15 PROCEDURE — 85379 FIBRIN DEGRADATION QUANT: CPT | Performed by: INTERNAL MEDICINE

## 2022-01-15 PROCEDURE — 99232 PR SUBSEQUENT HOSPITAL CARE,LEVL II: ICD-10-PCS | Mod: S$GLB,,, | Performed by: INTERNAL MEDICINE

## 2022-01-15 PROCEDURE — 12000002 HC ACUTE/MED SURGE SEMI-PRIVATE ROOM

## 2022-01-15 PROCEDURE — 27000221 HC OXYGEN, UP TO 24 HOURS

## 2022-01-15 PROCEDURE — 94799 UNLISTED PULMONARY SVC/PX: CPT

## 2022-01-15 PROCEDURE — 99232 SBSQ HOSP IP/OBS MODERATE 35: CPT | Mod: S$GLB,,, | Performed by: INTERNAL MEDICINE

## 2022-01-15 PROCEDURE — 25000242 PHARM REV CODE 250 ALT 637 W/ HCPCS: Performed by: INTERNAL MEDICINE

## 2022-01-15 PROCEDURE — 94664 DEMO&/EVAL PT USE INHALER: CPT

## 2022-01-15 PROCEDURE — 94761 N-INVAS EAR/PLS OXIMETRY MLT: CPT

## 2022-01-15 PROCEDURE — 63600175 PHARM REV CODE 636 W HCPCS: Performed by: INTERNAL MEDICINE

## 2022-01-15 PROCEDURE — 27100171 HC OXYGEN HIGH FLOW UP TO 24 HOURS

## 2022-01-15 RX ORDER — MAG HYDROX/ALUMINUM HYD/SIMETH 200-200-20
30 SUSPENSION, ORAL (FINAL DOSE FORM) ORAL
Status: DISCONTINUED | OUTPATIENT
Start: 2022-01-15 | End: 2022-01-17 | Stop reason: HOSPADM

## 2022-01-15 RX ADMIN — ENOXAPARIN SODIUM 80 MG: 80 INJECTION SUBCUTANEOUS at 08:01

## 2022-01-15 RX ADMIN — LEVETIRACETAM 500 MG: 500 TABLET, FILM COATED ORAL at 09:01

## 2022-01-15 RX ADMIN — ATORVASTATIN CALCIUM 20 MG: 20 TABLET, FILM COATED ORAL at 09:01

## 2022-01-15 RX ADMIN — THERA TABS 1 TABLET: TAB at 09:01

## 2022-01-15 RX ADMIN — OXYCODONE HYDROCHLORIDE AND ACETAMINOPHEN 500 MG: 500 TABLET ORAL at 09:01

## 2022-01-15 RX ADMIN — IPRATROPIUM BROMIDE AND ALBUTEROL SULFATE 3 ML: 2.5; .5 SOLUTION RESPIRATORY (INHALATION) at 01:01

## 2022-01-15 RX ADMIN — FLUTICASONE PROPIONATE 100 MCG: 50 SPRAY, METERED NASAL at 09:01

## 2022-01-15 RX ADMIN — ALUMINUM HYDROXIDE, MAGNESIUM HYDROXIDE, AND SIMETHICONE 30 ML: 200; 200; 20 SUSPENSION ORAL at 05:01

## 2022-01-15 RX ADMIN — LEVETIRACETAM 500 MG: 500 TABLET, FILM COATED ORAL at 08:01

## 2022-01-15 RX ADMIN — GUAIFENESIN 600 MG: 600 TABLET, EXTENDED RELEASE ORAL at 08:01

## 2022-01-15 RX ADMIN — ENOXAPARIN SODIUM 80 MG: 80 INJECTION SUBCUTANEOUS at 09:01

## 2022-01-15 RX ADMIN — CETIRIZINE HYDROCHLORIDE 10 MG: 10 TABLET, FILM COATED ORAL at 09:01

## 2022-01-15 RX ADMIN — ALUMINUM HYDROXIDE, MAGNESIUM HYDROXIDE, AND SIMETHICONE 30 ML: 200; 200; 20 SUSPENSION ORAL at 08:01

## 2022-01-15 RX ADMIN — IPRATROPIUM BROMIDE AND ALBUTEROL SULFATE 3 ML: 2.5; .5 SOLUTION RESPIRATORY (INHALATION) at 07:01

## 2022-01-15 RX ADMIN — GUAIFENESIN 600 MG: 600 TABLET, EXTENDED RELEASE ORAL at 09:01

## 2022-01-15 RX ADMIN — OXYCODONE HYDROCHLORIDE AND ACETAMINOPHEN 500 MG: 500 TABLET ORAL at 08:01

## 2022-01-15 NOTE — RESPIRATORY THERAPY
Patient remains on Vapotherm at 32lpm and FI02 .65.  Hr 72 and 02 saturation 97% .  Decreased rate to 28bpm and 02 saturation is 94%.  Patient receiving duoneb tx follow by IS and Jesenia.  Patient averaging 1750ml on IS. Nurse notified.

## 2022-01-15 NOTE — SUBJECTIVE & OBJECTIVE
Interval History:  Patient appears comfortable.  Currently on Vapotherm at 32lpm and FI02 .65.  White count stable D-dimer trending down CRP trending down    Review of Systems   Constitutional: Negative for chills and fever.   HENT: Negative for congestion and sore throat.    Respiratory: Positive for cough. Negative for shortness of breath.    Cardiovascular: Negative for chest pain and palpitations.   Gastrointestinal: Negative for abdominal pain, diarrhea, nausea and vomiting.   Genitourinary: Negative for flank pain and hematuria.   Musculoskeletal: Negative for neck pain and neck stiffness.   Skin: Negative for pallor and rash.   Neurological: Positive for headaches. Negative for weakness.   Psychiatric/Behavioral: Negative for agitation and confusion.   All other systems reviewed and are negative.    Objective:     Vital Signs (Most Recent):  Temp: 96.9 °F (36.1 °C) (01/15/22 0734)  Pulse: 72 (01/15/22 0734)  Resp: 18 (01/15/22 0734)  BP: 139/77 (01/15/22 0734)  SpO2: 95 % (01/15/22 0734) Vital Signs (24h Range):  Temp:  [96.9 °F (36.1 °C)-98.9 °F (37.2 °C)] 96.9 °F (36.1 °C)  Pulse:  [71-92] 72  Resp:  [18-22] 18  SpO2:  [93 %-99 %] 95 %  BP: (123-145)/(69-84) 139/77     Weight: 80.6 kg (177 lb 11.1 oz)  Body mass index is 26.24 kg/m².    Intake/Output Summary (Last 24 hours) at 1/15/2022 1027  Last data filed at 1/14/2022 1700  Gross per 24 hour   Intake 480 ml   Output --   Net 480 ml      Physical Exam  Vitals and nursing note reviewed.   Constitutional:       General: He is not in acute distress.     Appearance: He is well-developed.   HENT:      Head: Normocephalic and atraumatic.   Eyes:      Conjunctiva/sclera: Conjunctivae normal.      Pupils: Pupils are equal, round, and reactive to light.   Cardiovascular:      Rate and Rhythm: Normal rate and regular rhythm.      Heart sounds: Normal heart sounds.   Pulmonary:      Effort: No tachypnea.      Breath sounds: Decreased breath sounds and rales  (bibasilar, L>R) present.   Abdominal:      General: Bowel sounds are normal. There is no distension.      Palpations: Abdomen is soft.      Tenderness: There is no abdominal tenderness.   Musculoskeletal:         General: Normal range of motion.      Cervical back: Normal range of motion and neck supple.   Skin:     General: Skin is warm and dry.   Neurological:      Mental Status: He is alert and oriented to person, place, and time.   Psychiatric:         Behavior: Behavior normal.         Significant Labs:   All pertinent labs within the past 24 hours have been reviewed.  BMP:   Recent Labs   Lab 01/14/22 0437         K 3.5      CO2 21*   BUN 14   CREATININE 0.8   CALCIUM 8.5*     CBC:   Recent Labs   Lab 01/14/22 0437   WBC 12.06   HGB 14.4   HCT 41.1          Significant Imaging: I have reviewed all pertinent imaging results/findings within the past 24 hours.

## 2022-01-15 NOTE — ASSESSMENT & PLAN NOTE
Patient with Hypoxic Respiratory failure which is Acute.  he is not on home oxygen. Supplemental oxygen was provided and noted- Oxygen Concentration (%):  [65] 65.   Signs/symptoms of respiratory failure include- tachypnea. Contributing diagnoses includes - COVID 19 Labs and images were reviewed. Patient Has not had a recent ABG. Will treat underlying causes and adjust management of respiratory failure as follows- continue supplemental oxygen as needed.

## 2022-01-15 NOTE — PROGRESS NOTES
[facility] Hospital Medicine  Progress Note    Patient Name: Stefano Donahue Jr.  MRN: [id number]  Patient Class: IP- Inpatient   Admission Date: 1/5/2022  Length of Stay: 10 days  Attending Physician: Hina Lopez MD  Primary Care Provider: Dario Ayon MD        Subjective:     Principal Problem:Pneumonia due to COVID-19 virus        HPI:  Stefano Donahue Jr. Is a 57 y/o male with a past medical history significant for HLD and seizures who presented to the ED for further evaluation of hypoxia.  He was diagnosed with COVID-19 one week ago.  His wife purchased a pulse ox in order to check the patient's oxygen saturation and today was noted to have a sat of 88%.  He was evaluated in the urgent care and thought to have pneumonia so he was sent to the ED.  During my interview, the patient's oxygen saturation was noted at 88-89% on room air and he was placed on supplemental oxygen.  Denies feeling SOB or chest pain, no nausea or vomiting.  He is placed in observation under the service of hospital medicine for continued medical management.         Overview/Hospital Course:  56-year-old male with history of hyperlipidemia and seizures admitted to the hospital medicine service with hypoxic respiratory failure secondary to COVID He was placed on supplemental oxygen, p.o. dexamethasone, Remdesivir and full-dose Lovenox.  Progressively patient required higher quantities of supplemental oxygen.  Patient received intravenous tocilizumab.  Pulmonary service was consulted.  Later on patient required transfer to intensive care unit for closer monitoring and use of noninvasive ventilatory therapy.  In intensive care unit, patient is maintained on Vapotherm oxygen which is slowly being weaned.  Patient is transferred back to medicine telemetry floor for further oxygen weaning.      Interval History:  Patient appears comfortable.  Currently on Vapotherm at 32lpm and FI02 .65.  White count stable D-dimer  trending down CRP trending down    Review of Systems   Constitutional: Negative for chills and fever.   HENT: Negative for congestion and sore throat.    Respiratory: Positive for cough. Negative for shortness of breath.    Cardiovascular: Negative for chest pain and palpitations.   Gastrointestinal: Negative for abdominal pain, diarrhea, nausea and vomiting.   Genitourinary: Negative for flank pain and hematuria.   Musculoskeletal: Negative for neck pain and neck stiffness.   Skin: Negative for pallor and rash.   Neurological: Positive for headaches. Negative for weakness.   Psychiatric/Behavioral: Negative for agitation and confusion.   All other systems reviewed and are negative.    Objective:     Vital Signs (Most Recent):  Temp: 96.9 °F (36.1 °C) (01/15/22 0734)  Pulse: 72 (01/15/22 0734)  Resp: 18 (01/15/22 0734)  BP: 139/77 (01/15/22 0734)  SpO2: 95 % (01/15/22 0734) Vital Signs (24h Range):  Temp:  [96.9 °F (36.1 °C)-98.9 °F (37.2 °C)] 96.9 °F (36.1 °C)  Pulse:  [71-92] 72  Resp:  [18-22] 18  SpO2:  [93 %-99 %] 95 %  BP: (123-145)/(69-84) 139/77     Weight: 80.6 kg (177 lb 11.1 oz)  Body mass index is 26.24 kg/m².    Intake/Output Summary (Last 24 hours) at 1/15/2022 1027  Last data filed at 1/14/2022 1700  Gross per 24 hour   Intake 480 ml   Output --   Net 480 ml      Physical Exam  Vitals and nursing note reviewed.   Constitutional:       General: He is not in acute distress.     Appearance: He is well-developed.   HENT:      Head: Normocephalic and atraumatic.   Eyes:      Conjunctiva/sclera: Conjunctivae normal.      Pupils: Pupils are equal, round, and reactive to light.   Cardiovascular:      Rate and Rhythm: Normal rate and regular rhythm.      Heart sounds: Normal heart sounds.   Pulmonary:      Effort: No tachypnea.      Breath sounds: Decreased breath sounds and rales (bibasilar, L>R) present.   Abdominal:      General: Bowel sounds are normal. There is no distension.      Palpations: Abdomen is  soft.      Tenderness: There is no abdominal tenderness.   Musculoskeletal:         General: Normal range of motion.      Cervical back: Normal range of motion and neck supple.   Skin:     General: Skin is warm and dry.   Neurological:      Mental Status: He is alert and oriented to person, place, and time.   Psychiatric:         Behavior: Behavior normal.         Significant Labs:   All pertinent labs within the past 24 hours have been reviewed.  BMP:   Recent Labs   Lab 01/14/22 0437         K 3.5      CO2 21*   BUN 14   CREATININE 0.8   CALCIUM 8.5*     CBC:   Recent Labs   Lab 01/14/22 0437   WBC 12.06   HGB 14.4   HCT 41.1          Significant Imaging: I have reviewed all pertinent imaging results/findings within the past 24 hours.      Assessment/Plan:      * Pneumonia due to COVID-19 virus  - COVID-19 testing   - Infection Control notified     - Isolation:   - Airborne, Contact and Droplet Precautions  - Cohort patients into COVID units  - N95 mask, wear eye protection  - 20 second hand hygiene              - Limit visitors per hospital policy              - Consolidating lab draws, nursing care, provider visits, and interventions    - Diagnostics: (leukopenia, hyponatremia, hyperferritinemia, elevated troponin, elevated d-dimer, age, and comorbidities are significant predictors of poor clinical outcome)  CBC, CMP, Procalcitonin, Ferritin, CRP, LDH, BNP, Troponin, ECG, Blood Culture x2, Portable CXR and UA and culture    - Management:  Supplemental O2 to maintain SpO2 >92%  Telemetry  Continuous/intermittent Pulse Ox  Albuterol treatment PRN  Completed Remdesivir + Tocilizumab.  Inflammation markers trending down.  Dexamethasone  FD lovenox per [facility] policy - patient counseled on risks and benefits and is agreeable.  Give intravenous fluid bolus today for low blood pressure.  Patient is asymptomatic.  Advance Care Planning   code status:  FULL code               Seizures  Chronic  condition.  Maintain seizure/fall/aspiration precautions.  Continue chronic keppra.      Acute respiratory failure with hypoxia  Patient with Hypoxic Respiratory failure which is Acute.  he is not on home oxygen. Supplemental oxygen was provided and noted- Oxygen Concentration (%):  [65] 65.   Signs/symptoms of respiratory failure include- tachypnea. Contributing diagnoses includes - COVID 19 Labs and images were reviewed. Patient Has not had a recent ABG. Will treat underlying causes and adjust management of respiratory failure as follows- continue supplemental oxygen as needed.    Hyperlipidemia   Patient is chronically on statin.will continue for now. Monitor clinically. Last LDL was No results found for: LDLCALC           VTE Risk Mitigation (From admission, onward)         Ordered     enoxaparin injection 80 mg  2 times daily         01/05/22 0626                Discharge Planning   NEL:      Code Status: Full Code   Is the patient medically ready for discharge?:     Reason for patient still in hospital (select all that apply): Patient trending condition and Treatment  Discharge Plan A: Home                  Hina Lopez MD  Department of Hospital Medicine   [facility]

## 2022-01-15 NOTE — PLAN OF CARE
Pt rested quietly this shift, no respiratory issues, skin intact, no observed/reported seizure activity, wife at bedside, no c/o pain, Isolation precautions maintained.

## 2022-01-16 PROCEDURE — 25000242 PHARM REV CODE 250 ALT 637 W/ HCPCS: Performed by: INTERNAL MEDICINE

## 2022-01-16 PROCEDURE — 94761 N-INVAS EAR/PLS OXIMETRY MLT: CPT

## 2022-01-16 PROCEDURE — 27000207 HC ISOLATION

## 2022-01-16 PROCEDURE — 12000002 HC ACUTE/MED SURGE SEMI-PRIVATE ROOM

## 2022-01-16 PROCEDURE — 94799 UNLISTED PULMONARY SVC/PX: CPT

## 2022-01-16 PROCEDURE — 99232 PR SUBSEQUENT HOSPITAL CARE,LEVL II: ICD-10-PCS | Mod: S$GLB,,, | Performed by: INTERNAL MEDICINE

## 2022-01-16 PROCEDURE — 94664 DEMO&/EVAL PT USE INHALER: CPT

## 2022-01-16 PROCEDURE — 99232 SBSQ HOSP IP/OBS MODERATE 35: CPT | Mod: S$GLB,,, | Performed by: INTERNAL MEDICINE

## 2022-01-16 PROCEDURE — 27000221 HC OXYGEN, UP TO 24 HOURS

## 2022-01-16 PROCEDURE — 25000003 PHARM REV CODE 250: Performed by: INTERNAL MEDICINE

## 2022-01-16 PROCEDURE — 94640 AIRWAY INHALATION TREATMENT: CPT

## 2022-01-16 PROCEDURE — 99900035 HC TECH TIME PER 15 MIN (STAT)

## 2022-01-16 PROCEDURE — 25000003 PHARM REV CODE 250: Performed by: NURSE PRACTITIONER

## 2022-01-16 PROCEDURE — 63600175 PHARM REV CODE 636 W HCPCS: Performed by: INTERNAL MEDICINE

## 2022-01-16 RX ORDER — AMOXICILLIN 250 MG
1 CAPSULE ORAL DAILY PRN
Status: DISCONTINUED | OUTPATIENT
Start: 2022-01-16 | End: 2022-01-17 | Stop reason: HOSPADM

## 2022-01-16 RX ORDER — POLYETHYLENE GLYCOL 3350 17 G/17G
17 POWDER, FOR SOLUTION ORAL DAILY
Status: DISCONTINUED | OUTPATIENT
Start: 2022-01-16 | End: 2022-01-17 | Stop reason: HOSPADM

## 2022-01-16 RX ADMIN — ENOXAPARIN SODIUM 80 MG: 80 INJECTION SUBCUTANEOUS at 08:01

## 2022-01-16 RX ADMIN — IPRATROPIUM BROMIDE AND ALBUTEROL SULFATE 3 ML: 2.5; .5 SOLUTION RESPIRATORY (INHALATION) at 12:01

## 2022-01-16 RX ADMIN — GUAIFENESIN 600 MG: 600 TABLET, EXTENDED RELEASE ORAL at 08:01

## 2022-01-16 RX ADMIN — CETIRIZINE HYDROCHLORIDE 10 MG: 10 TABLET, FILM COATED ORAL at 08:01

## 2022-01-16 RX ADMIN — THERA TABS 1 TABLET: TAB at 08:01

## 2022-01-16 RX ADMIN — OXYCODONE HYDROCHLORIDE AND ACETAMINOPHEN 500 MG: 500 TABLET ORAL at 08:01

## 2022-01-16 RX ADMIN — FLUTICASONE PROPIONATE 100 MCG: 50 SPRAY, METERED NASAL at 09:01

## 2022-01-16 RX ADMIN — LEVETIRACETAM 500 MG: 500 TABLET, FILM COATED ORAL at 08:01

## 2022-01-16 RX ADMIN — ALUMINUM HYDROXIDE, MAGNESIUM HYDROXIDE, AND SIMETHICONE 30 ML: 200; 200; 20 SUSPENSION ORAL at 05:01

## 2022-01-16 RX ADMIN — IPRATROPIUM BROMIDE AND ALBUTEROL SULFATE 3 ML: 2.5; .5 SOLUTION RESPIRATORY (INHALATION) at 07:01

## 2022-01-16 RX ADMIN — POLYETHYLENE GLYCOL (3350) 17 G: 17 POWDER, FOR SOLUTION ORAL at 08:01

## 2022-01-16 RX ADMIN — ALUMINUM HYDROXIDE, MAGNESIUM HYDROXIDE, AND SIMETHICONE 30 ML: 200; 200; 20 SUSPENSION ORAL at 08:01

## 2022-01-16 RX ADMIN — ATORVASTATIN CALCIUM 20 MG: 20 TABLET, FILM COATED ORAL at 08:01

## 2022-01-16 NOTE — PROGRESS NOTES
Patient continues to improve, no signs of distress, Pt remains free of falls, VS stable, RR even and unlabored, Abd non-distended, BS in all four quadrants, clear speech, makes needs known, bed in low position with wheels locked call light in reach.

## 2022-01-16 NOTE — CARE UPDATE
01/15/22 1919   Patient Assessment/Suction   Level of Consciousness (AVPU) alert   Respiratory Effort Normal;Unlabored   Expansion/Accessory Muscles/Retractions expansion symmetric;no retractions;no use of accessory muscles   All Lung Fields Breath Sounds diminished   Cough Frequency no cough   PRE-TX-O2   O2 Device (Oxygen Therapy) Vapotherm   Flow (L/min) 28   Oxygen Concentration (%) 62   SpO2 98 %   Pulse Oximetry Type Continuous   Pulse 82   Resp 20   Aerosol Therapy   $ Aerosol Therapy Charges Aerosol Treatment   Respiratory Treatment Status (SVN) given   Treatment Route (SVN) in-line   Patient Position (SVN) sitting in chair   Signs of Intolerance (SVN) none   Incentive Spirometer   $ Incentive Spirometer Charges done with encouragement   Administration (IS) proper technique demonstrated   Number of Repetitions (IS) 10   Level Incentive Spirometer (mL) 1750   Patient Tolerance (IS) good   Vibratory PEP Therapy   $ Vibratory PEP Charges Aerobika Therapy   $ Vibratory PEP Tech Time Charges 15 min   Type (PEP Therapy) vibratory/oscillatory   Device (PEP Therapy) flutter   Route (PEP Therapy) proper technique demonstrated   Breaths per Cycle (PEP Therapy) 10   Cycles (PEP Therapy) 1   Patient Position (PEP Therapy) sitting in chair   Signs of Intolerance (PEP Therapy) none   Ready to Wean/Extubation Screen   FIO2<=50 (chart decimal) (!) 0.62

## 2022-01-16 NOTE — PROGRESS NOTES
Continues with vapotherm, independent activity, wife at bedside, Pt remains free of falls, VS stable, RR even and unlabored, Abd non-distended, BS in all four quadrants, clear speech, makes needs known, bed in low position with wheels locked call light in reach.

## 2022-01-16 NOTE — PROGRESS NOTES
[facility] Hospital Medicine  Progress Note    Patient Name: Stefano Donahue Jr.  MRN: [id number]  Patient Class: IP- Inpatient   Admission Date: 1/5/2022  Length of Stay: 11 days  Attending Physician: Hina Lopez MD  Primary Care Provider: Dario Ayon MD        Subjective:     Principal Problem:Pneumonia due to COVID-19 virus        HPI:  Stefano Donahue Jr. Is a 55 y/o male with a past medical history significant for HLD and seizures who presented to the ED for further evaluation of hypoxia.  He was diagnosed with COVID-19 one week ago.  His wife purchased a pulse ox in order to check the patient's oxygen saturation and today was noted to have a sat of 88%.  He was evaluated in the urgent care and thought to have pneumonia so he was sent to the ED.  During my interview, the patient's oxygen saturation was noted at 88-89% on room air and he was placed on supplemental oxygen.  Denies feeling SOB or chest pain, no nausea or vomiting.  He is placed in observation under the service of hospital medicine for continued medical management.         Overview/Hospital Course:  56-year-old male with history of hyperlipidemia and seizures admitted to the hospital medicine service with hypoxic respiratory failure secondary to COVID He was placed on supplemental oxygen, p.o. dexamethasone, Remdesivir and full-dose Lovenox.  Progressively patient required higher quantities of supplemental oxygen.  Patient received intravenous tocilizumab.  Pulmonary service was consulted.  Later on patient required transfer to intensive care unit for closer monitoring and use of noninvasive ventilatory therapy.  In intensive care unit, patient is maintained on Vapotherm oxygen which is slowly being weaned.  Patient is transferred back to medicine telemetry floor for further oxygen weaning.      Interval History:  Patient denies any new symptoms oxygenation improved significantly currently on Vapotherm FiO2  50%    Review of Systems   Constitutional: Negative for chills and fever.   HENT: Negative for congestion and sore throat.    Respiratory: Positive for cough. Negative for shortness of breath.    Cardiovascular: Negative for chest pain and palpitations.   Gastrointestinal: Negative for abdominal pain, diarrhea, nausea and vomiting.   Genitourinary: Negative for flank pain and hematuria.   Musculoskeletal: Negative for neck pain and neck stiffness.   Skin: Negative for pallor and rash.   Neurological: Positive for headaches. Negative for weakness.   Psychiatric/Behavioral: Negative for agitation and confusion.   All other systems reviewed and are negative.    Objective:     Vital Signs (Most Recent):  Temp: 97.7 °F (36.5 °C) (01/16/22 1139)  Pulse: 82 (01/16/22 1139)  Resp: 18 (01/16/22 1139)  BP: 131/86 (01/16/22 1139)  SpO2: 96 % (01/16/22 1139) Vital Signs (24h Range):  Temp:  [97.4 °F (36.3 °C)-98.9 °F (37.2 °C)] 97.7 °F (36.5 °C)  Pulse:  [65-91] 82  Resp:  [12-20] 18  SpO2:  [91 %-99 %] 96 %  BP: (122-152)/(65-86) 131/86     Weight: 80.6 kg (177 lb 11.1 oz)  Body mass index is 26.24 kg/m².    Intake/Output Summary (Last 24 hours) at 1/16/2022 1226  Last data filed at 1/16/2022 0600  Gross per 24 hour   Intake 300 ml   Output --   Net 300 ml      Physical Exam  Vitals and nursing note reviewed.   Constitutional:       General: He is not in acute distress.     Appearance: He is well-developed.   HENT:      Head: Normocephalic and atraumatic.   Eyes:      Conjunctiva/sclera: Conjunctivae normal.      Pupils: Pupils are equal, round, and reactive to light.   Cardiovascular:      Rate and Rhythm: Normal rate and regular rhythm.      Heart sounds: Normal heart sounds.   Pulmonary:      Effort: No tachypnea.      Breath sounds: Decreased breath sounds and rales (bibasilar, L>R) present.   Abdominal:      General: Bowel sounds are normal. There is no distension.      Palpations: Abdomen is soft.      Tenderness: There  is no abdominal tenderness.   Musculoskeletal:         General: Normal range of motion.      Cervical back: Normal range of motion and neck supple.   Skin:     General: Skin is warm and dry.   Neurological:      Mental Status: He is alert and oriented to person, place, and time.   Psychiatric:         Behavior: Behavior normal.         Significant Labs: All pertinent labs within the past 24 hours have been reviewed.  BMP: No results for input(s): GLU, NA, K, CL, CO2, BUN, CREATININE, CALCIUM, MG in the last 48 hours.  CBC: No results for input(s): WBC, HGB, HCT, PLT in the last 48 hours.    Significant Imaging: I have reviewed all pertinent imaging results/findings within the past 24 hours.      Assessment/Plan:      * Pneumonia due to COVID-19 virus  - COVID-19 testing   - Infection Control notified     - Isolation:   - Airborne, Contact and Droplet Precautions  - Cohort patients into COVID units  - N95 mask, wear eye protection  - 20 second hand hygiene              - Limit visitors per hospital policy              - Consolidating lab draws, nursing care, provider visits, and interventions    - Diagnostics: (leukopenia, hyponatremia, hyperferritinemia, elevated troponin, elevated d-dimer, age, and comorbidities are significant predictors of poor clinical outcome)  CBC, CMP, Procalcitonin, Ferritin, CRP, LDH, BNP, Troponin, ECG, Blood Culture x2, Portable CXR and UA and culture    - Management:  Supplemental O2 to maintain SpO2 >92%  Telemetry  Continuous/intermittent Pulse Ox  Albuterol treatment PRN  Completed Remdesivir + Tocilizumab.  Inflammation markers trending down.  Dexamethasone  FD lovenox per [facility] policy - patient counseled on risks and benefits and is agreeable.  Give intravenous fluid bolus today for low blood pressure.  Patient is asymptomatic.  Advance Care Planning   code status:  FULL code               Seizures  Chronic condition.  Maintain seizure/fall/aspiration precautions.  Continue  chronic keppra.      Acute respiratory failure with hypoxia  Patient with Hypoxic Respiratory failure which is Acute.  he is not on home oxygen. Supplemental oxygen was provided and noted- Oxygen Concentration (%):  [50-65] 50.   Signs/symptoms of respiratory failure include- tachypnea. Contributing diagnoses includes - COVID 19 Labs and images were reviewed. Patient Has not had a recent ABG. Will treat underlying causes and adjust management of respiratory failure as follows- continue supplemental oxygen as needed.    Hyperlipidemia   Patient is chronically on statin.will continue for now. Monitor clinically. Last LDL was No results found for: LDLCALC           VTE Risk Mitigation (From admission, onward)         Ordered     enoxaparin injection 80 mg  2 times daily         01/05/22 0626                Discharge Planning   NEL:      Code Status: Full Code   Is the patient medically ready for discharge?:     Reason for patient still in hospital (select all that apply): Patient trending condition and Treatment  Discharge Plan A: Home                  Hina Lopez MD  Department of Hospital Medicine   [facility]

## 2022-01-16 NOTE — PLAN OF CARE
Plan of care reviewed with patient. Patient verbalized complete understanding. Pt remained afebrile. Antibiotics administered as ordered. Currently on 28 LPM @ 62% Vapotherm sp02 remained above 90% throughout shift. Up in chair at beginning of shift. Pt not complaining of any pain. Pt able to ambulate in the room.   All fall precautions maintained, bed in lowest position, locked, call light within reach. Side rails up times 2. Slip resistant socks maintained.

## 2022-01-16 NOTE — ASSESSMENT & PLAN NOTE
Patient with Hypoxic Respiratory failure which is Acute.  he is not on home oxygen. Supplemental oxygen was provided and noted- Oxygen Concentration (%):  [50-65] 50.   Signs/symptoms of respiratory failure include- tachypnea. Contributing diagnoses includes - COVID 19 Labs and images were reviewed. Patient Has not had a recent ABG. Will treat underlying causes and adjust management of respiratory failure as follows- continue supplemental oxygen as needed.

## 2022-01-16 NOTE — RESPIRATORY THERAPY
Decreased Vapotherm to 15L @ 35%  Will change to regular Nasal cannula as tolerated sats 97% BBS improved

## 2022-01-16 NOTE — PROGRESS NOTES
Progress Note  PULMONARY    Admit Date: 1/5/2022   01/15/2022      From Dr Choi's consult :  History of Present Illness:  Pt is a 55 yo male with COVID-19 pneumonia. He denies any pmh or hx lung disease. States he came into ED because sats were low but doesn't have any complaints other than some chest tightness when he takes a deep breath. Today his O2 requirement went up to 8L. Wife at bedside assists w/ history.       SUBJECTIVE:     1/8- worsening oxygenation, req 12L HFNC  1/9- on HFNC 10L this am, no fevers, VS stable  1/10- continuous cpap, sats improved, transferred to ICU for closer monitoring. Pt still denies any symptoms. Does not feel sob, cough, congestion or chest pain. Does not notice when his sats drop  1/11- on vapotherm, no new complaints  1/12- continues on vapotherm, no new issues  1/13- resp status stable with vapotherm, cpap at night  1/14 Above from Dr Choi and below from Dr Schwab:  No new c/o    1/15/2022 - Stable, no new issues reported, still on vapotherm    OBJECTIVE:     Vitals (Most recent):  Vitals:    01/15/22 1946   BP: (!) 143/82   Pulse: 87   Resp: 16   Temp: 97.5 °F (36.4 °C)       Vitals (24 hour range):  Temp:  [96.5 °F (35.8 °C)-98.9 °F (37.2 °C)]   Pulse:  [71-92]   Resp:  [16-20]   BP: (125-152)/(75-84)   SpO2:  [91 %-99 %]     No intake or output data in the 24 hours ending 01/15/22 2047       Physical Exam:  The patient's neuro status (alertness,orientation,cognitive function,motor skills,), pharyngeal exam (oral lesions, hygiene, abn dentition,), Neck (jvd,mass,thyroid,nodes in neck and above/below clavicle),RESPIRATORY(symmetry,effort,fremitus,percussion,auscultation),  Cor(rhythm,heart tones including gallops,perfusion,edema)ABD(distention,hepatic&splenomegaly,tenderness,masses), Skin(rash,cyanosis),Psyc(affect,judgement,).  Exam negative except for these pertinent findings:    No distress, awake and alert  Scattered rales bilaterally  No edema     Radiographs reviewed:  view by direct vision   CXR 1/10- mild progression bilateral infiltrates  CXR 1/4/22- very mild pnuemonitis bilat bases    Labs     Results for ERIN MARTINEZ JR. (MRN [id number]) as of 1/10/2022 13:21   Ref. Range 1/5/2022 07:24 1/7/2022 05:13 1/10/2022 11:15   Ferritin Latest Ref Range: 20.0 - 300.0 ng/mL 2,439 (H) 1,500 (H) 1,525 (H)     No results for input(s): WBC, HGB, HCT, PLT, BAND, METAMYELOCYT, MYELOPCT, HGBA1C in the last 24 hours.  Recent Labs   Lab 01/15/22  0833   CRP 0.8   No results for input(s): PH, PCO2, PO2, HCO3 in the last 24 hours.  Microbiology Results (last 7 days)     ** No results found for the last 168 hours. **          Impression:  Active Hospital Problems    Diagnosis  POA    *Pneumonia due to COVID-19 virus [U07.1, J12.82]  Yes    Acute respiratory failure with hypoxia [J96.01]  Yes    Seizures [R56.9]  Yes    Hyperlipidemia [E78.5]  Yes      Resolved Hospital Problems   No resolved problems to display.               Plan:     - continue supplemental O2 to keep sats >92%. cpap as needed  - continue inhaled bronchodilators  - incentive spirometry   - completed RDV  - continue decadron 6mg daily  - received tocilizumab  - monitor inflammatory markers  - continue empiric therapeutic lovenox  - needs to prone  - up in chair  - continue PT  - transfer out of ICU  - stop precedex  - use atarax prn anxiety    Above from Dr Choi and below from Dr Schwab:  1/14 no fever,vss, decadron 6/d day 11,    65% ox needs now from 80% on 11th, crp 1.8 on 13th,  cxr fairly clear on 10th- only mild dz , earlier cxr also near clear, on therapeutic Lovenox.    Might consider cta given clear cxrs?  Discussed with pt-- would rec 3 months anticoagg if pe seen- otherwise stop anticoagg at AL.  Will check cta today.  Discussed with wife also  cta viewed directly, no large central clots but may have small peripheral clots to my view best seen in coronal views-- report says no pe to segmental level.  There is  densely opacified lung in the periphery with more nl central areas likely accounting for normal looking cxr.  Stopping anticoagg at dc reasonable give tiny size.    1/15/2022 - Below by Dr Gates    · Continue present meds  · Wean O2 as able  · Seems to be improving slowly  · Inflammatory markers trending down      Dejon Gates MD  [facility] Pulmonary/Critical Care  01/15/2022

## 2022-01-16 NOTE — SUBJECTIVE & OBJECTIVE
Interval History:  Patient denies any new symptoms oxygenation improved significantly currently on Vapotherm FiO2 50%    Review of Systems   Constitutional: Negative for chills and fever.   HENT: Negative for congestion and sore throat.    Respiratory: Positive for cough. Negative for shortness of breath.    Cardiovascular: Negative for chest pain and palpitations.   Gastrointestinal: Negative for abdominal pain, diarrhea, nausea and vomiting.   Genitourinary: Negative for flank pain and hematuria.   Musculoskeletal: Negative for neck pain and neck stiffness.   Skin: Negative for pallor and rash.   Neurological: Positive for headaches. Negative for weakness.   Psychiatric/Behavioral: Negative for agitation and confusion.   All other systems reviewed and are negative.    Objective:     Vital Signs (Most Recent):  Temp: 97.7 °F (36.5 °C) (01/16/22 1139)  Pulse: 82 (01/16/22 1139)  Resp: 18 (01/16/22 1139)  BP: 131/86 (01/16/22 1139)  SpO2: 96 % (01/16/22 1139) Vital Signs (24h Range):  Temp:  [97.4 °F (36.3 °C)-98.9 °F (37.2 °C)] 97.7 °F (36.5 °C)  Pulse:  [65-91] 82  Resp:  [12-20] 18  SpO2:  [91 %-99 %] 96 %  BP: (122-152)/(65-86) 131/86     Weight: 80.6 kg (177 lb 11.1 oz)  Body mass index is 26.24 kg/m².    Intake/Output Summary (Last 24 hours) at 1/16/2022 1226  Last data filed at 1/16/2022 0600  Gross per 24 hour   Intake 300 ml   Output --   Net 300 ml      Physical Exam  Vitals and nursing note reviewed.   Constitutional:       General: He is not in acute distress.     Appearance: He is well-developed.   HENT:      Head: Normocephalic and atraumatic.   Eyes:      Conjunctiva/sclera: Conjunctivae normal.      Pupils: Pupils are equal, round, and reactive to light.   Cardiovascular:      Rate and Rhythm: Normal rate and regular rhythm.      Heart sounds: Normal heart sounds.   Pulmonary:      Effort: No tachypnea.      Breath sounds: Decreased breath sounds and rales (bibasilar, L>R) present.   Abdominal:       General: Bowel sounds are normal. There is no distension.      Palpations: Abdomen is soft.      Tenderness: There is no abdominal tenderness.   Musculoskeletal:         General: Normal range of motion.      Cervical back: Normal range of motion and neck supple.   Skin:     General: Skin is warm and dry.   Neurological:      Mental Status: He is alert and oriented to person, place, and time.   Psychiatric:         Behavior: Behavior normal.         Significant Labs: All pertinent labs within the past 24 hours have been reviewed.  BMP: No results for input(s): GLU, NA, K, CL, CO2, BUN, CREATININE, CALCIUM, MG in the last 48 hours.  CBC: No results for input(s): WBC, HGB, HCT, PLT in the last 48 hours.    Significant Imaging: I have reviewed all pertinent imaging results/findings within the past 24 hours.

## 2022-01-17 ENCOUNTER — PATIENT MESSAGE (OUTPATIENT)
Dept: ADMINISTRATIVE | Facility: CLINIC | Age: 57
End: 2022-01-17
Payer: COMMERCIAL

## 2022-01-17 VITALS
BODY MASS INDEX: 26.32 KG/M2 | WEIGHT: 177.69 LBS | RESPIRATION RATE: 12 BRPM | HEART RATE: 88 BPM | DIASTOLIC BLOOD PRESSURE: 83 MMHG | OXYGEN SATURATION: 96 % | HEIGHT: 69 IN | SYSTOLIC BLOOD PRESSURE: 125 MMHG | TEMPERATURE: 97 F

## 2022-01-17 LAB
CRP SERPL-MCNC: 0.8 MG/L (ref 0–8.2)
D DIMER PPP IA.FEU-MCNC: 2.76 MG/L FEU

## 2022-01-17 PROCEDURE — 25000003 PHARM REV CODE 250: Performed by: NURSE PRACTITIONER

## 2022-01-17 PROCEDURE — 36415 COLL VENOUS BLD VENIPUNCTURE: CPT | Performed by: INTERNAL MEDICINE

## 2022-01-17 PROCEDURE — 94618 PULMONARY STRESS TESTING: CPT

## 2022-01-17 PROCEDURE — 94664 DEMO&/EVAL PT USE INHALER: CPT

## 2022-01-17 PROCEDURE — 25000003 PHARM REV CODE 250: Performed by: INTERNAL MEDICINE

## 2022-01-17 PROCEDURE — 99900031 HC PATIENT EDUCATION (STAT)

## 2022-01-17 PROCEDURE — 94761 N-INVAS EAR/PLS OXIMETRY MLT: CPT

## 2022-01-17 PROCEDURE — 86140 C-REACTIVE PROTEIN: CPT | Performed by: INTERNAL MEDICINE

## 2022-01-17 PROCEDURE — 99231 PR SUBSEQUENT HOSPITAL CARE,LEVL I: ICD-10-PCS | Mod: ,,, | Performed by: INTERNAL MEDICINE

## 2022-01-17 PROCEDURE — 63600175 PHARM REV CODE 636 W HCPCS: Performed by: INTERNAL MEDICINE

## 2022-01-17 PROCEDURE — 85379 FIBRIN DEGRADATION QUANT: CPT | Performed by: INTERNAL MEDICINE

## 2022-01-17 PROCEDURE — 94640 AIRWAY INHALATION TREATMENT: CPT

## 2022-01-17 PROCEDURE — 27000221 HC OXYGEN, UP TO 24 HOURS

## 2022-01-17 PROCEDURE — 99231 SBSQ HOSP IP/OBS SF/LOW 25: CPT | Mod: ,,, | Performed by: INTERNAL MEDICINE

## 2022-01-17 PROCEDURE — 25000242 PHARM REV CODE 250 ALT 637 W/ HCPCS: Performed by: INTERNAL MEDICINE

## 2022-01-17 PROCEDURE — 99900035 HC TECH TIME PER 15 MIN (STAT)

## 2022-01-17 RX ORDER — GUAIFENESIN 600 MG/1
600 TABLET, EXTENDED RELEASE ORAL 2 TIMES DAILY
Qty: 20 TABLET | Refills: 0 | Status: SHIPPED | OUTPATIENT
Start: 2022-01-17 | End: 2022-01-27

## 2022-01-17 RX ORDER — ASCORBIC ACID 500 MG
500 TABLET ORAL 2 TIMES DAILY
Qty: 60 TABLET | Refills: 0 | Status: SHIPPED | OUTPATIENT
Start: 2022-01-17 | End: 2022-02-16

## 2022-01-17 RX ADMIN — ENOXAPARIN SODIUM 80 MG: 80 INJECTION SUBCUTANEOUS at 08:01

## 2022-01-17 RX ADMIN — LEVETIRACETAM 500 MG: 500 TABLET, FILM COATED ORAL at 08:01

## 2022-01-17 RX ADMIN — CETIRIZINE HYDROCHLORIDE 10 MG: 10 TABLET, FILM COATED ORAL at 08:01

## 2022-01-17 RX ADMIN — IPRATROPIUM BROMIDE AND ALBUTEROL SULFATE 3 ML: 2.5; .5 SOLUTION RESPIRATORY (INHALATION) at 01:01

## 2022-01-17 RX ADMIN — IPRATROPIUM BROMIDE AND ALBUTEROL SULFATE 3 ML: 2.5; .5 SOLUTION RESPIRATORY (INHALATION) at 07:01

## 2022-01-17 RX ADMIN — THERA TABS 1 TABLET: TAB at 08:01

## 2022-01-17 RX ADMIN — ATORVASTATIN CALCIUM 20 MG: 20 TABLET, FILM COATED ORAL at 08:01

## 2022-01-17 RX ADMIN — FLUTICASONE PROPIONATE 100 MCG: 50 SPRAY, METERED NASAL at 08:01

## 2022-01-17 RX ADMIN — SENNOSIDES AND DOCUSATE SODIUM 1 TABLET: 50; 8.6 TABLET ORAL at 08:01

## 2022-01-17 RX ADMIN — OXYCODONE HYDROCHLORIDE AND ACETAMINOPHEN 500 MG: 500 TABLET ORAL at 08:01

## 2022-01-17 RX ADMIN — GUAIFENESIN 600 MG: 600 TABLET, EXTENDED RELEASE ORAL at 08:01

## 2022-01-17 NOTE — PT/OT/SLP PROGRESS
Physical Therapy      Patient Name:  Stefano Donahue Jr.   MRN:  [id number]    Patient not seen today secondary to Patient unwilling to participate due to pending D/C.

## 2022-01-17 NOTE — CARE UPDATE
01/17/22 1000   Home Oxygen Qualification   $ Home O2 Qualification Pulmonary Stress Test/6 min walk   Room Air SpO2 At Rest 90 %   Room Air SpO2 During Ambulation (!) 84 %   SpO2 During Ambulation on O2 92 %   Heart Rate on O2 128 bpm   Ambulation O2 LPM 2 LPM   SpO2 Post Ambulation 93 %   Post Ambulation Heart Rate 122 bpm   Post Ambulation O2 LPM 2 LPM

## 2022-01-17 NOTE — PROGRESS NOTES
Progress Note  PULMONARY    Admit Date: 1/5/2022 01/17/2022      From Dr Choi's consult :  History of Present Illness:  Pt is a 57 yo male with COVID-19 pneumonia. He denies any pmh or hx lung disease. States he came into ED because sats were low but doesn't have any complaints other than some chest tightness when he takes a deep breath. Today his O2 requirement went up to 8L. Wife at bedside assists w/ history.       SUBJECTIVE:     1/8- worsening oxygenation, req 12L HFNC  1/9- on HFNC 10L this am, no fevers, VS stable  1/10- continuous cpap, sats improved, transferred to ICU for closer monitoring. Pt still denies any symptoms. Does not feel sob, cough, congestion or chest pain. Does not notice when his sats drop  1/11- on vapotherm, no new complaints  1/12- continues on vapotherm, no new issues  1/13- resp status stable with vapotherm, cpap at night  1/14 Above from Dr Choi and below from Dr Schwab:  No new c/o    1/15/2022 - Stable, no new issues reported, still on vapotherm    1/16/2022 - Stable overnight, decreased FiO2 to 45% when I saw him and he has been weaned further throughout the day.  1/17- feels good, plan for dc home    OBJECTIVE:     Vitals (Most recent):  Vitals:    01/17/22 0749   BP: 137/74   Pulse: 107   Resp: 16   Temp: 98.7 °F (37.1 °C)       Vitals (24 hour range):  Temp:  [97.3 °F (36.3 °C)-99 °F (37.2 °C)]   Pulse:  []   Resp:  [12-18]   BP: (129-140)/(74-86)   SpO2:  [92 %-98 %]     No intake or output data in the 24 hours ending 01/17/22 1048       Physical Exam:  The patient's neuro status (alertness,orientation,cognitive function,motor skills,), pharyngeal exam (oral lesions, hygiene, abn dentition,), Neck (jvd,mass,thyroid,nodes in neck and above/below clavicle),RESPIRATORY(symmetry,effort,fremitus,percussion,auscultation),  Cor(rhythm,heart tones including gallops,perfusion,edema)ABD(distention,hepatic&splenomegaly,tenderness,masses),  Skin(rash,cyanosis),Psyc(affect,judgement,).  Exam negative except for these pertinent findings:    No distress, awake and alert  Clear breath sounds bilat  No edema     Radiographs reviewed: view by direct vision   CXR 1/10- mild progression bilateral infiltrates  CXR 1/4/22- very mild pnuemonitis bilat bases    Labs     Results for ERIN MARTINEZ JR. (MRN [id number]) as of 1/10/2022 13:21   Ref. Range 1/5/2022 07:24 1/7/2022 05:13 1/10/2022 11:15   Ferritin Latest Ref Range: 20.0 - 300.0 ng/mL 2,439 (H) 1,500 (H) 1,525 (H)     No results for input(s): WBC, HGB, HCT, PLT, BAND, METAMYELOCYT, MYELOPCT, HGBA1C in the last 24 hours.  Recent Labs   Lab 01/17/22  0815   CRP 0.8   No results for input(s): PH, PCO2, PO2, HCO3 in the last 24 hours.  Microbiology Results (last 7 days)     ** No results found for the last 168 hours. **          Impression:  Active Hospital Problems    Diagnosis  POA    *Pneumonia due to COVID-19 virus [U07.1, J12.82]  Yes    Acute respiratory failure with hypoxia [J96.01]  Yes    Seizures [R56.9]  Yes    Hyperlipidemia [E78.5]  Yes      Resolved Hospital Problems   No resolved problems to display.               Plan:     - continue supplemental O2 to keep sats >92%  - continue inhaled bronchodilators  - incentive spirometry   - continue empiric therapeutic lovenox- ok to dc on discharge  - up in chair  - continue PT  - dc home ok from my standpoint; home O2 eval  - talked w/ him and wife; welcome to come to pulm clinic in future if needed but the plan for now is to f/u with PCP      Soila Choi MD  Pulmonary & Critical Care Medicine

## 2022-01-17 NOTE — PROGRESS NOTES
Progress Note  PULMONARY    Admit Date: 1/5/2022 01/16/2022      From Dr Choi's consult :  History of Present Illness:  Pt is a 57 yo male with COVID-19 pneumonia. He denies any pmh or hx lung disease. States he came into ED because sats were low but doesn't have any complaints other than some chest tightness when he takes a deep breath. Today his O2 requirement went up to 8L. Wife at bedside assists w/ history.       SUBJECTIVE:     1/8- worsening oxygenation, req 12L HFNC  1/9- on HFNC 10L this am, no fevers, VS stable  1/10- continuous cpap, sats improved, transferred to ICU for closer monitoring. Pt still denies any symptoms. Does not feel sob, cough, congestion or chest pain. Does not notice when his sats drop  1/11- on vapotherm, no new complaints  1/12- continues on vapotherm, no new issues  1/13- resp status stable with vapotherm, cpap at night  1/14 Above from Dr Choi and below from Dr Schwab:  No new c/o    1/15/2022 - Stable, no new issues reported, still on vapotherm    1/16/2022 - Stable overnight, decreased FiO2 to 45% when I saw him and he has been weaned further throughout the day.    OBJECTIVE:     Vitals (Most recent):  Vitals:    01/16/22 1949   BP:    Pulse: 85   Resp: 18   Temp:        Vitals (24 hour range):  Temp:  [97.3 °F (36.3 °C)-98.9 °F (37.2 °C)]   Pulse:  [65-95]   Resp:  [12-20]   BP: (122-140)/(65-86)   SpO2:  [91 %-99 %]       Intake/Output Summary (Last 24 hours) at 1/16/2022 2054  Last data filed at 1/16/2022 0600  Gross per 24 hour   Intake 300 ml   Output --   Net 300 ml          Physical Exam:  The patient's neuro status (alertness,orientation,cognitive function,motor skills,), pharyngeal exam (oral lesions, hygiene, abn dentition,), Neck (jvd,mass,thyroid,nodes in neck and above/below clavicle),RESPIRATORY(symmetry,effort,fremitus,percussion,auscultation),  Cor(rhythm,heart tones including gallops,perfusion,edema)ABD(distention,hepatic&splenomegaly,tenderness,masses),  Skin(rash,cyanosis),Psyc(affect,judgement,).  Exam negative except for these pertinent findings:    No distress, awake and alert  Scattered rales bilaterally  No edema     Radiographs reviewed: view by direct vision   CXR 1/10- mild progression bilateral infiltrates  CXR 1/4/22- very mild pnuemonitis bilat bases    Labs     Results for ERIN MARTINEZ JR. (MRN [id number]) as of 1/10/2022 13:21   Ref. Range 1/5/2022 07:24 1/7/2022 05:13 1/10/2022 11:15   Ferritin Latest Ref Range: 20.0 - 300.0 ng/mL 2,439 (H) 1,500 (H) 1,525 (H)     No results for input(s): WBC, HGB, HCT, PLT, BAND, METAMYELOCYT, MYELOPCT, HGBA1C in the last 24 hours.  No results for input(s): NA, K, CL, CO2, BUN, CREATININE, GLU, CALCIUM, CAION, MG, PHOS, AST, ALT, ALKPHOS, BILITOT, BILIDIR, PROT, ALBUMIN, PREALBUMIN, AMYLASE, LIPASE, CRP, HSCRP, SEDRATE, PROCAL, INR, PTT, LABHEPA, LACTATE, TROPONINI, CPK, CPKMB, MB, BNP in the last 24 hours.No results for input(s): PH, PCO2, PO2, HCO3 in the last 24 hours.  Microbiology Results (last 7 days)     ** No results found for the last 168 hours. **          Impression:  Active Hospital Problems    Diagnosis  POA    *Pneumonia due to COVID-19 virus [U07.1, J12.82]  Yes    Acute respiratory failure with hypoxia [J96.01]  Yes    Seizures [R56.9]  Yes    Hyperlipidemia [E78.5]  Yes      Resolved Hospital Problems   No resolved problems to display.               Plan:     - continue supplemental O2 to keep sats >92%. cpap as needed  - continue inhaled bronchodilators  - incentive spirometry   - completed RDV  - continue decadron 6mg daily  - received tocilizumab  - monitor inflammatory markers  - continue empiric therapeutic lovenox  - needs to prone  - up in chair  - continue PT  - transfer out of ICU  - stop precedex  - use atarax prn anxiety    Above from Dr Choi and below from Dr Schwab:  1/14 no fever,vss, decadron 6/d day 11,    65% ox needs now from 80% on 11th, crp 1.8 on 13th,  cxr fairly clear on  10th- only mild dz , earlier cxr also near clear, on therapeutic Lovenox.    Might consider cta given clear cxrs?  Discussed with pt-- would rec 3 months anticoagg if pe seen- otherwise stop anticoagg at dc.  Will check cta today.  Discussed with wife also  cta viewed directly, no large central clots but may have small peripheral clots to my view best seen in coronal views-- report says no pe to segmental level.  There is densely opacified lung in the periphery with more nl central areas likely accounting for normal looking cxr.  Stopping anticoagg at dc reasonable give tiny size.    1/16/2022 - Below by Dr Gates    · Continue present meds  · Wean O2 as able  · Seems to be improving slowly  · Inflammatory markers trending down  · May be ready to go home soon      Dejon Gates MD  [facility] Pulmonary/Critical Care  01/16/2022

## 2022-01-17 NOTE — PLAN OF CARE
Home o2 approved by Manuel Philip with [facility]. CM will deliver 1 etank set up to nurse's station due to +covid       01/17/22 1218   Post-Acute Status   Post-Acute Authorization HME   HME Status (!) Pending Delivery

## 2022-01-17 NOTE — PLAN OF CARE
Plan of care reviewed with patient. Patient verbalized complete understanding. Pt remained afebrile. Antibiotics administered as ordered. Currently on 1LPM  sp02 remained above 90% throughout shift. Up in chair at beginning of shift. Pt not complaining of any pain. Pt able to ambulate in the room. Pt complaining of constipation, miralax given.  All fall precautions maintained, bed in lowest position, locked, call light within reach. Side rails up times 2. Slip resistant socks maintained

## 2022-01-17 NOTE — NURSING
D/c education provided, pt verbalized understanding. PIV and tele removed, O2 delivered, and education received. Pt left floor safely via wheelchair.

## 2022-01-17 NOTE — PLAN OF CARE
Manuel Philip from [facility] approved 1 etank set up. CM delivered to pt's nurse due to +COVID. also delivered pulse ox. Returned completed delivery ticket to depot          01/17/22 1974   Post-Acute Status   Post-Acute Authorization E   E Status Set-up Complete/Auth obtained

## 2022-01-17 NOTE — PLAN OF CARE
Pt clear for DC from case management standpoint. Discharging to home with new home o2 set up from [facility]       01/17/22 2391   Final Note   Assessment Type Final Discharge Note   Anticipated Discharge Disposition Home

## 2022-01-18 ENCOUNTER — TELEPHONE (OUTPATIENT)
Dept: MEDSURG UNIT | Facility: HOSPITAL | Age: 57
End: 2022-01-18
Payer: COMMERCIAL

## 2022-01-18 ENCOUNTER — PATIENT MESSAGE (OUTPATIENT)
Dept: ADMINISTRATIVE | Facility: OTHER | Age: 57
End: 2022-01-18
Payer: COMMERCIAL

## 2022-01-18 ENCOUNTER — NURSE TRIAGE (OUTPATIENT)
Dept: ADMINISTRATIVE | Facility: CLINIC | Age: 57
End: 2022-01-18
Payer: COMMERCIAL

## 2022-01-18 NOTE — TELEPHONE ENCOUNTER
Pt escalated for SpO2 of 93. Attempted to contact pt at all numbers listed with no success. Left voicemail and sent [employer] message. Non-emergent escalation. Will continue to monitor.    Reason for Disposition   Message left on identified voicemail    Protocols used: NO CONTACT OR DUPLICATE CONTACT CALL-A-[place]

## 2022-01-19 ENCOUNTER — PATIENT MESSAGE (OUTPATIENT)
Dept: ADMINISTRATIVE | Facility: OTHER | Age: 57
End: 2022-01-19
Payer: COMMERCIAL

## 2022-01-19 ENCOUNTER — NURSE TRIAGE (OUTPATIENT)
Dept: ADMINISTRATIVE | Facility: CLINIC | Age: 57
End: 2022-01-19
Payer: COMMERCIAL

## 2022-01-19 NOTE — TELEPHONE ENCOUNTER
Pt called for escalation for having to increase O2 but he said that was an accident and he said that he is doing well and not having any trouble. Pt states he will reach out if he has any problems, He will continue to monitor as needed     Reason for Disposition   Information only question and nurse able to answer    Protocols used: INFORMATION ONLY CALL - NO TRIAGE-A-OH

## 2022-01-20 ENCOUNTER — PATIENT MESSAGE (OUTPATIENT)
Dept: ADMINISTRATIVE | Facility: OTHER | Age: 57
End: 2022-01-20
Payer: COMMERCIAL

## 2022-01-21 ENCOUNTER — PATIENT MESSAGE (OUTPATIENT)
Dept: ADMINISTRATIVE | Facility: OTHER | Age: 57
End: 2022-01-21
Payer: COMMERCIAL

## 2022-01-22 ENCOUNTER — PATIENT MESSAGE (OUTPATIENT)
Dept: ADMINISTRATIVE | Facility: OTHER | Age: 57
End: 2022-01-22
Payer: COMMERCIAL

## 2022-01-23 ENCOUNTER — PATIENT MESSAGE (OUTPATIENT)
Dept: ADMINISTRATIVE | Facility: OTHER | Age: 57
End: 2022-01-23
Payer: COMMERCIAL

## 2022-01-24 ENCOUNTER — TELEPHONE (OUTPATIENT)
Dept: PULMONOLOGY | Facility: CLINIC | Age: 57
End: 2022-01-24
Payer: COMMERCIAL

## 2022-01-24 ENCOUNTER — PATIENT MESSAGE (OUTPATIENT)
Dept: ADMINISTRATIVE | Facility: OTHER | Age: 57
End: 2022-01-24
Payer: COMMERCIAL

## 2022-01-24 DIAGNOSIS — U07.1 PNEUMONIA DUE TO COVID-19 VIRUS: Primary | ICD-10-CM

## 2022-01-24 DIAGNOSIS — J12.82 PNEUMONIA DUE TO COVID-19 VIRUS: Primary | ICD-10-CM

## 2022-01-24 RX ORDER — ALBUTEROL SULFATE 0.83 MG/ML
2.5 SOLUTION RESPIRATORY (INHALATION) EVERY 6 HOURS PRN
Qty: 120 ML | Refills: 1 | Status: SHIPPED | OUTPATIENT
Start: 2022-01-24 | End: 2022-02-15

## 2022-01-24 NOTE — TELEPHONE ENCOUNTER
Spoke to wife, didn't see meds on file. Sent message to dr. Choi for rx.     ----- Message from Zia Moulton sent at 1/24/2022 11:30 AM CST -----  Contact: Spouse/Syl  Type: Needs Medical Advice  Who Called:  Spouse/Syl  Best Call Back Number: [phone]  Additional Information: Called to speak with staff regarding getting a prescription for albuterol for pt's nebulizer

## 2022-01-24 NOTE — TELEPHONE ENCOUNTER
----- Message from Soila Choi MD sent at 1/24/2022  4:18 PM CST -----  Regarding: RE: hospital pt  Ordered.  ----- Message -----  From: Nany Hopkins MA  Sent: 1/24/2022  11:41 AM CST  To: Soila Choi MD  Subject: hospital pt                                      Patient calling needing  prescription for albuterol for nebulizer, I dont see it on his chart. Would like it to go to [facility] on file.     Thanks  Nany RIOS

## 2022-01-25 ENCOUNTER — PATIENT MESSAGE (OUTPATIENT)
Dept: ADMINISTRATIVE | Facility: CLINIC | Age: 57
End: 2022-01-25
Payer: COMMERCIAL

## 2022-01-25 ENCOUNTER — TELEPHONE (OUTPATIENT)
Dept: FAMILY MEDICINE | Facility: CLINIC | Age: 57
End: 2022-01-25
Payer: COMMERCIAL

## 2022-01-25 NOTE — TELEPHONE ENCOUNTER
That is usually caused by swelling and congestion in the back of the nose that blocks up the duct from the middle ear, the eustachian tube, that drains fluid out of the middle ear.  He can use some Flonase nasal steroid spray to reduce the swelling but it takes a little time.  If he can do it carefully and not get hooked on it he could use some Afrin that will open it up faster.  Afrin has a rebound when it wears off.  the nose swells closed worse so the more you use it the more you have to use it.  Most experts recommend not using Afrin more than three days, I think two days should be plenty.

## 2022-01-25 NOTE — TELEPHONE ENCOUNTER
----- Message from Tiffanie Saravia sent at 1/25/2022 12:10 PM CST -----  Type: Needs Medical Advice  Who Called: Pt  Symptoms (please be specific):  Pt DC from Hospital on 1/17/22 and had covid and pneumonia. Pt states there is fluid in his ears sloshing around. He is asking to speak to someone to discuss what he should do. He is still sick and doesn't want to come in the office and get anyone else sick.     Best Call Back Number: [phone]  Additional Information: Please call and and advise on how to proceed. He asked that I send the message to the department.

## 2022-01-27 ENCOUNTER — PATIENT MESSAGE (OUTPATIENT)
Dept: PULMONOLOGY | Facility: CLINIC | Age: 57
End: 2022-01-27
Payer: COMMERCIAL

## 2022-01-27 ENCOUNTER — TELEPHONE (OUTPATIENT)
Dept: PULMONOLOGY | Facility: CLINIC | Age: 57
End: 2022-01-27
Payer: COMMERCIAL

## 2022-01-27 NOTE — TELEPHONE ENCOUNTER
Sent patient portal message about scheduled appt for hospital follow up...    ----- Message from Rosanne Allen sent at 1/27/2022  4:39 PM CST -----  Regarding: hospital follow up  Contact: luis daniel rowe- spouse  Caller: luis daniel rowe- spouse  Phone: [phone]  Nature of call: caller requesting hospital follow up   Reason: covid and pneumonia follow up   Discharge date: 1/17/22  Epic shows availability: 3/3/22  Please call upon request.  Thank you!

## 2022-02-15 ENCOUNTER — OFFICE VISIT (OUTPATIENT)
Dept: PULMONOLOGY | Facility: CLINIC | Age: 57
End: 2022-02-15
Payer: COMMERCIAL

## 2022-02-15 VITALS
DIASTOLIC BLOOD PRESSURE: 77 MMHG | WEIGHT: 188.94 LBS | HEART RATE: 90 BPM | BODY MASS INDEX: 27.98 KG/M2 | RESPIRATION RATE: 18 BRPM | SYSTOLIC BLOOD PRESSURE: 125 MMHG | HEIGHT: 69 IN | OXYGEN SATURATION: 96 %

## 2022-02-15 DIAGNOSIS — J45.20 MILD INTERMITTENT REACTIVE AIRWAY DISEASE WITHOUT COMPLICATION: ICD-10-CM

## 2022-02-15 DIAGNOSIS — J12.82 PNEUMONIA DUE TO COVID-19 VIRUS: ICD-10-CM

## 2022-02-15 DIAGNOSIS — U07.1 PNEUMONIA DUE TO COVID-19 VIRUS: ICD-10-CM

## 2022-02-15 DIAGNOSIS — F41.9 ANXIETY: ICD-10-CM

## 2022-02-15 DIAGNOSIS — U09.9 COVID-19 LONG HAULER MANIFESTING CHRONIC DYSPNEA: ICD-10-CM

## 2022-02-15 DIAGNOSIS — J96.01 ACUTE RESPIRATORY FAILURE WITH HYPOXIA: ICD-10-CM

## 2022-02-15 DIAGNOSIS — R06.09 COVID-19 LONG HAULER MANIFESTING CHRONIC DYSPNEA: ICD-10-CM

## 2022-02-15 DIAGNOSIS — F43.10 PTSD (POST-TRAUMATIC STRESS DISORDER): Primary | ICD-10-CM

## 2022-02-15 PROBLEM — J45.909 REACTIVE AIRWAY DISEASE WITHOUT COMPLICATION: Status: ACTIVE | Noted: 2022-02-15

## 2022-02-15 PROCEDURE — 3008F PR BODY MASS INDEX (BMI) DOCUMENTED: ICD-10-PCS | Mod: CPTII,S$GLB,, | Performed by: INTERNAL MEDICINE

## 2022-02-15 PROCEDURE — 1159F MED LIST DOCD IN RCRD: CPT | Mod: CPTII,S$GLB,, | Performed by: INTERNAL MEDICINE

## 2022-02-15 PROCEDURE — 3078F DIAST BP <80 MM HG: CPT | Mod: CPTII,S$GLB,, | Performed by: INTERNAL MEDICINE

## 2022-02-15 PROCEDURE — 3078F PR MOST RECENT DIASTOLIC BLOOD PRESSURE < 80 MM HG: ICD-10-PCS | Mod: CPTII,S$GLB,, | Performed by: INTERNAL MEDICINE

## 2022-02-15 PROCEDURE — 3008F BODY MASS INDEX DOCD: CPT | Mod: CPTII,S$GLB,, | Performed by: INTERNAL MEDICINE

## 2022-02-15 PROCEDURE — 99214 PR OFFICE/OUTPT VISIT, EST, LEVL IV, 30-39 MIN: ICD-10-PCS | Mod: S$GLB,,, | Performed by: INTERNAL MEDICINE

## 2022-02-15 PROCEDURE — 3074F SYST BP LT 130 MM HG: CPT | Mod: CPTII,S$GLB,, | Performed by: INTERNAL MEDICINE

## 2022-02-15 PROCEDURE — 3074F PR MOST RECENT SYSTOLIC BLOOD PRESSURE < 130 MM HG: ICD-10-PCS | Mod: CPTII,S$GLB,, | Performed by: INTERNAL MEDICINE

## 2022-02-15 PROCEDURE — 99214 OFFICE O/P EST MOD 30 MIN: CPT | Mod: S$GLB,,, | Performed by: INTERNAL MEDICINE

## 2022-02-15 PROCEDURE — 99999 PR PBB SHADOW E&M-EST. PATIENT-LVL V: ICD-10-PCS | Mod: PBBFAC,,, | Performed by: INTERNAL MEDICINE

## 2022-02-15 PROCEDURE — 1159F PR MEDICATION LIST DOCUMENTED IN MEDICAL RECORD: ICD-10-PCS | Mod: CPTII,S$GLB,, | Performed by: INTERNAL MEDICINE

## 2022-02-15 PROCEDURE — 1111F DSCHRG MED/CURRENT MED MERGE: CPT | Mod: CPTII,S$GLB,, | Performed by: INTERNAL MEDICINE

## 2022-02-15 PROCEDURE — 99999 PR PBB SHADOW E&M-EST. PATIENT-LVL V: CPT | Mod: PBBFAC,,, | Performed by: INTERNAL MEDICINE

## 2022-02-15 PROCEDURE — 1111F PR DISCHARGE MEDS RECONCILED W/ CURRENT OUTPATIENT MED LIST: ICD-10-PCS | Mod: CPTII,S$GLB,, | Performed by: INTERNAL MEDICINE

## 2022-02-15 RX ORDER — LEVALBUTEROL TARTRATE 45 UG/1
1-2 AEROSOL, METERED ORAL EVERY 4 HOURS PRN
Qty: 15 G | Refills: 11 | Status: SHIPPED | OUTPATIENT
Start: 2022-02-15 | End: 2022-02-21

## 2022-02-15 RX ORDER — FLUTICASONE FUROATE AND VILANTEROL TRIFENATATE 200; 25 UG/1; UG/1
1 POWDER RESPIRATORY (INHALATION) DAILY
Qty: 1 EACH | Refills: 11 | Status: SHIPPED | OUTPATIENT
Start: 2022-02-15 | End: 2022-02-21

## 2022-02-15 RX ORDER — BUSPIRONE HYDROCHLORIDE 10 MG/1
10 TABLET ORAL 3 TIMES DAILY
Qty: 90 TABLET | Refills: 0 | Status: SHIPPED | OUTPATIENT
Start: 2022-02-15 | End: 2022-06-14

## 2022-02-15 NOTE — PROGRESS NOTES
2/15/2022    Stefano Donahue Jr.  Hospital Follow Up    Chief Complaint   Patient presents with    Follow-up     2 week hospital; patient states he is doing okay. He is still using oxygen prn.         HPI: Pt is a 58 yo male with recent acute resp failure, COVID-19 infection presenting for follow up s/p hospital stay. I took care of him in the hospital - he was admitted 12d, treated with decadron, remdesivir and baracitinib. Required CPAP and high flow oxygen for his respiratory failure and then gradually got better. He was discharged with home O2.  He feels more winded in am. Sometimes coughing spell and will have phlegm in the am.  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 He does not do much activity, not climbing stairs or walking outdoors. He does get winded sometimes with exertion and feels exhausted after trying to do too much.  Gets anxious at night, feels like not having enough air, has panic attacks. HR will go up to 120s- up at 4am today with sudden feeling like he couldn't breathe, gasping,  but pulse ox was 96%. He is not on any med for anxiety. His anxiety issues did predate the covid infection but have worsened since illness.  Feels slightly better w/ albuterol- 3x/day. Sometimes wheezes in the morning, notices when showering steam will make wheeze louder.       The chief complaint problem is new to me    PFSH:  Past Medical History:   Diagnosis Date    Anxiety     flying    Claustrophobia     Dizziness     Hyperlipidemia          Past Surgical History:   Procedure Laterality Date    APPENDECTOMY      COLONOSCOPY  2013    Dr Cheo lucero 5 years    VASECTOMY       Social History     Tobacco Use    Smoking status: Former Smoker     Packs/day: 0.25     Years: 19.00     Pack years: 4.75     Types: Cigarettes     Quit date: 2013     Years since quittin.7    Smokeless tobacco: Never Used    Tobacco comment: Quitting currently    Substance Use  "Topics    Alcohol use: No     Alcohol/week: 0.0 standard drinks    Drug use: No     Family History   Problem Relation Age of Onset    Cancer Mother         ovarian    Early death Mother     No Known Problems Father     No Known Problems Sister     No Known Problems Brother     No Known Problems Daughter     No Known Problems Son     No Known Problems Maternal Aunt     No Known Problems Maternal Uncle     No Known Problems Maternal Grandmother     Stroke Paternal Grandfather      Review of patient's allergies indicates:   Allergen Reactions    Codeine Anaphylaxis       Performance Status:The patient's activity level is functions out of house.      Review of Systems:  a review of eleven systems covering constitutional, Eye, HEENT, Psych, Respiratory, Cardiac, GI, , Musculoskeletal, Endocrine, Dermatologic was negative except for pertinent findings as listed ABOVE and below:  All negative with pertinent positives as above        Exam:Comprehensive exam done. /77 (BP Location: Left arm, Patient Position: Sitting, BP Method: Large (Automatic))   Pulse 90   Resp 18   Ht 5' 9" (1.753 m)   Wt 85.7 kg (188 lb 15.3 oz)   SpO2 96% Comment: on room air at rest  BMI 27.90 kg/m²   Exam included Vitals as listed, and patient's appearance and affect and alertness and mood, oral exam for yeast and hygiene and pharynx lesions and Mallapatti (M) score, neck with inspection for jvd and masses and thyroid abnormalities and lymph nodes (supraclavicular and infraclavicular nodes and axillary also examined and noted if abn), chest exam included symmetry and effort and fremitus and percussion and auscultation, cardiac exam included rhythm and gallops and murmur and rubs and jvd and edema, abdominal exam for mass and hepatosplenomegaly and tenderness and hernias and bowel sounds, Musculoskeletal exam with muscle tone and posture and mobility/gait and  strength, and skin for rashes and cyanosis and pallor and " turgor, extremity for clubbing.  Findings were normal except for pertinent findings listed below:  anxious  Clear breath sounds bilaterally  HR regular  No edema      Radiographs (ct chest and cxr) reviewed: view by direct vision   CTA chest 1/14/22-   1. No pulmonary embolic disease to the proximal segmental level.  2. Extensive bilateral pulmonary opacities in keeping with history of COVID pneumonia.    Labs reviewed    CBC, CMP 1/2022 wnl     PFT was not done      Plan:  Clinical impression is apparently straight forward and impression with management as below. COVID long haul w/ chronic dyspnea, severe anxiety/PTSD. He has symptomatic improvement with inhalers- possible postviral reactive airways    Stefano was seen today for follow-up.    Diagnoses and all orders for this visit:    PTSD (post-traumatic stress disorder)  -     Ambulatory referral/consult to Psychiatry; Future    Pneumonia due to COVID-19 virus  -     fluticasone furoate-vilanteroL (BREO ELLIPTA) 200-25 mcg/dose DsDv diskus inhaler; Inhale 1 puff into the lungs once daily. Controller  -     levalbuterol (XOPENEX HFA) 45 mcg/actuation inhaler; Inhale 1-2 puffs into the lungs every 4 (four) hours as needed for Wheezing. Rescue  -     Ambulatory referral/consult to Pulmonary Rehab; Future    Acute respiratory failure with hypoxia    Anxiety  -     busPIRone (BUSPAR) 10 MG tablet; Take 1 tablet (10 mg total) by mouth 3 (three) times daily.  -     Ambulatory referral/consult to Psychiatry; Future    COVID-19 long hauler manifesting chronic dyspnea    Mild intermittent reactive airway disease without complication        Follow up in about 3 months (around 5/15/2022).    Discussed with patient above for education the following:      Patient Instructions   Start breo inhaler one puff daily- rinse mouth after use  Stop albuterol and switch to xopenex inhaler as needed  When oxygen sats <92% need to wear oxygen, with exertion for now. Can monitor pulse ox at  home to get an idea of when you need oxygen  Pulmonary rehab referral- exercise program 3 times per week  For anxiety take buspar three times daily  Psychiatry referral

## 2022-02-15 NOTE — PATIENT INSTRUCTIONS
Start breo inhaler one puff daily- rinse mouth after use  Stop albuterol and switch to xopenex inhaler as needed  When oxygen sats <92% need to wear oxygen, with exertion for now. Can monitor pulse ox at home to get an idea of when you need oxygen  Pulmonary rehab referral- exercise program 3 times per week  For anxiety take buspar three times daily  Psychiatry referral

## 2022-02-17 ENCOUNTER — TELEPHONE (OUTPATIENT)
Dept: PULMONOLOGY | Facility: CLINIC | Age: 57
End: 2022-02-17
Payer: COMMERCIAL

## 2022-02-17 NOTE — TELEPHONE ENCOUNTER
Called and spoke with patient. Informed I would work on PA.  He v/u.    ----- Message from Sandy Carmona sent at 2/17/2022  9:30 AM CST -----  Contact: pt  Type: Needs Medical Advice    Who Called:  Pt  Best Call Back Number: [phone]    Additional Information: Requesting a call back regarding pt inhaler. Pt is needing a PA for inhaler   Please Advise ---Thank you

## 2022-02-18 ENCOUNTER — PATIENT MESSAGE (OUTPATIENT)
Dept: RHEUMATOLOGY | Facility: CLINIC | Age: 57
End: 2022-02-18
Payer: COMMERCIAL

## 2022-02-18 ENCOUNTER — TELEPHONE (OUTPATIENT)
Dept: PULMONOLOGY | Facility: CLINIC | Age: 57
End: 2022-02-18
Payer: COMMERCIAL

## 2022-02-18 NOTE — TELEPHONE ENCOUNTER
Patient's insurance not covering his BREO or LEVALBUTEROL inhaler.    Needs other medication sent to pharmacy.

## 2022-02-21 ENCOUNTER — PATIENT MESSAGE (OUTPATIENT)
Dept: PULMONOLOGY | Facility: CLINIC | Age: 57
End: 2022-02-21
Payer: COMMERCIAL

## 2022-02-21 DIAGNOSIS — J45.20 MILD INTERMITTENT REACTIVE AIRWAY DISEASE WITHOUT COMPLICATION: Primary | ICD-10-CM

## 2022-02-21 RX ORDER — FLUTICASONE PROPIONATE AND SALMETEROL XINAFOATE 230; 21 UG/1; UG/1
2 AEROSOL, METERED RESPIRATORY (INHALATION) 2 TIMES DAILY
Qty: 12 G | Refills: 11 | Status: SHIPPED | OUTPATIENT
Start: 2022-02-21 | End: 2022-06-14

## 2022-02-21 RX ORDER — ALBUTEROL SULFATE 90 UG/1
1-2 AEROSOL, METERED RESPIRATORY (INHALATION) EVERY 4 HOURS PRN
Qty: 18 G | Refills: 11 | Status: SHIPPED | OUTPATIENT
Start: 2022-02-21 | End: 2022-06-14

## 2022-02-25 ENCOUNTER — TELEPHONE (OUTPATIENT)
Dept: RHEUMATOLOGY | Facility: CLINIC | Age: 57
End: 2022-02-25
Payer: COMMERCIAL

## 2022-02-25 NOTE — TELEPHONE ENCOUNTER
Called pt regarding below message. Left voicemail with return number.       Offer appt on 4/4 @ 1 pm or anytime on 4/8.

## 2022-05-31 ENCOUNTER — PATIENT MESSAGE (OUTPATIENT)
Dept: ADMINISTRATIVE | Facility: HOSPITAL | Age: 57
End: 2022-05-31
Payer: COMMERCIAL

## 2022-06-14 ENCOUNTER — OFFICE VISIT (OUTPATIENT)
Dept: FAMILY MEDICINE | Facility: CLINIC | Age: 57
End: 2022-06-14
Payer: COMMERCIAL

## 2022-06-14 VITALS
TEMPERATURE: 98 F | HEART RATE: 78 BPM | BODY MASS INDEX: 29.19 KG/M2 | HEIGHT: 69 IN | WEIGHT: 197.06 LBS | OXYGEN SATURATION: 96 %

## 2022-06-14 DIAGNOSIS — F41.9 ANXIETY: Primary | ICD-10-CM

## 2022-06-14 DIAGNOSIS — F40.240 CLAUSTROPHOBIA: ICD-10-CM

## 2022-06-14 PROBLEM — R06.09 COVID-19 LONG HAULER MANIFESTING CHRONIC DYSPNEA: Status: RESOLVED | Noted: 2022-02-15 | Resolved: 2022-06-14

## 2022-06-14 PROBLEM — U07.1 PNEUMONIA DUE TO COVID-19 VIRUS: Status: RESOLVED | Noted: 2022-01-05 | Resolved: 2022-06-14

## 2022-06-14 PROBLEM — J96.01 ACUTE RESPIRATORY FAILURE WITH HYPOXIA: Status: RESOLVED | Noted: 2022-01-05 | Resolved: 2022-06-14

## 2022-06-14 PROBLEM — J12.82 PNEUMONIA DUE TO COVID-19 VIRUS: Status: RESOLVED | Noted: 2022-01-05 | Resolved: 2022-06-14

## 2022-06-14 PROBLEM — U09.9 COVID-19 LONG HAULER MANIFESTING CHRONIC DYSPNEA: Status: RESOLVED | Noted: 2022-02-15 | Resolved: 2022-06-14

## 2022-06-14 PROCEDURE — 99213 PR OFFICE/OUTPT VISIT, EST, LEVL III, 20-29 MIN: ICD-10-PCS | Mod: S$GLB,,, | Performed by: PHYSICIAN ASSISTANT

## 2022-06-14 PROCEDURE — 3008F BODY MASS INDEX DOCD: CPT | Mod: CPTII,S$GLB,, | Performed by: PHYSICIAN ASSISTANT

## 2022-06-14 PROCEDURE — 99213 OFFICE O/P EST LOW 20 MIN: CPT | Mod: S$GLB,,, | Performed by: PHYSICIAN ASSISTANT

## 2022-06-14 PROCEDURE — 99999 PR PBB SHADOW E&M-EST. PATIENT-LVL III: CPT | Mod: PBBFAC,,, | Performed by: PHYSICIAN ASSISTANT

## 2022-06-14 PROCEDURE — 99999 PR PBB SHADOW E&M-EST. PATIENT-LVL III: ICD-10-PCS | Mod: PBBFAC,,, | Performed by: PHYSICIAN ASSISTANT

## 2022-06-14 PROCEDURE — 3008F PR BODY MASS INDEX (BMI) DOCUMENTED: ICD-10-PCS | Mod: CPTII,S$GLB,, | Performed by: PHYSICIAN ASSISTANT

## 2022-06-14 PROCEDURE — 1159F MED LIST DOCD IN RCRD: CPT | Mod: CPTII,S$GLB,, | Performed by: PHYSICIAN ASSISTANT

## 2022-06-14 PROCEDURE — 1159F PR MEDICATION LIST DOCUMENTED IN MEDICAL RECORD: ICD-10-PCS | Mod: CPTII,S$GLB,, | Performed by: PHYSICIAN ASSISTANT

## 2022-06-14 RX ORDER — OMEGA-3/DHA/EPA/FISH OIL 200-300 MG
CAPSULE ORAL
COMMUNITY
Start: 2022-04-14 | End: 2024-03-06 | Stop reason: SDUPTHER

## 2022-06-14 RX ORDER — SIMVASTATIN 80 MG/1
40 TABLET, FILM COATED ORAL
COMMUNITY
Start: 2021-08-12

## 2022-06-14 RX ORDER — ALPRAZOLAM 0.25 MG/1
0.25 TABLET ORAL 3 TIMES DAILY PRN
Qty: 45 TABLET | Refills: 0 | Status: SHIPPED | OUTPATIENT
Start: 2022-06-14 | End: 2023-06-27 | Stop reason: SDUPTHER
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
## 2022-08-16 NOTE — DISCHARGE SUMMARY
INTERNAL MEDICINE OFFICE VISIT    Chief Complaint   Patient presents with   • Back Pain   • Allergies         SUBJECTIVE:  Brian Callahan is a 21 year old male with PTSD and generalized anxiety disorder who presents with:    Midline low back pain - and lower neck/upper thoracic pain. He wonders if it's due to posture or from lifting. Back pain has been ongoing for about 3 years. There is a family hx of rheumatologic disease. Better with straightened posture. Worst in the morning and at the end of the night. No nighttime awakenings. Takes 30 minutes to loosen up. He feels some hand puffiness  No uveitis, dysuria, rash.    Nasal allergies - epistaxis in the past and then had rhinorrhea after cautery of bleeding vessel. Fexofenadine helped. Tried flonase and it only worked for a short time. Worst in the morning. No clear trigger. Will get nosebleed if he blows his nose in the morning. Nasal mucosae feels dry. Uses vaseline and neti pot without relief.    Fatigue - chronic generalized tiredness. Sleeps from 9-11am until 5-7am every day. No witnessed apneas, but he does snore. Anxiety continues to be an issue.  __________________  Not discussed today (copied from prior notes, potentially with updates):  Itchiness of scalp-with flaking skin. Improved with Lidex solution at last appointment. Some ongoing irritation of the scalp.  Anxiety and depression - prior hx: decline in school performance. Was in partial hospitalization program at [facility] previously. Low appetite and trouble sleeping. Fatigued on mirtazapine. History of hallucinogenic drug use and marijuana use - none in over a year. 3 suicide attempts in the past. On escitalopram and quetiapine and hydroxyzine.    OBJECTIVE:  Physical Exam:    Vital Signs:    Vitals:    08/16/22 0728   BP: 134/82   BP Location: LUE - Left upper extremity   Patient Position: Sitting   Cuff Size: Regular   Pulse: 85   Temp: 98.4 °F (36.9 °C)   TempSrc: Oral   Weight:  [facility] Hospital Medicine  Discharge Summary      Patient Name: Stefano Donahue Jr.  MRN: [id number]  Patient Class: IP- Inpatient  Admission Date: 1/5/2022  Hospital Length of Stay: 12 days  Discharge Date and Time: No discharge date for patient encounter.  Attending Physician: Hina Lopez MD   Discharging Provider: Hina Lopez MD  Primary Care Provider: Dario Ayon MD      HPI:   Stefano Donahue Jr. Is a 55 y/o male with a past medical history significant for HLD and seizures who presented to the ED for further evaluation of hypoxia.  He was diagnosed with COVID-19 one week ago.  His wife purchased a pulse ox in order to check the patient's oxygen saturation and today was noted to have a sat of 88%.  He was evaluated in the urgent care and thought to have pneumonia so he was sent to the ED.  During my interview, the patient's oxygen saturation was noted at 88-89% on room air and he was placed on supplemental oxygen.  Denies feeling SOB or chest pain, no nausea or vomiting.  He is placed in observation under the service of hospital medicine for continued medical management.         * No surgery found *      Hospital Course:   56-year-old male with history of hyperlipidemia and seizures admitted to the hospital medicine service with hypoxic respiratory failure secondary to COVID He was placed on supplemental oxygen, p.o. dexamethasone, Remdesivir and full-dose Lovenox.  Progressively patient required higher quantities of supplemental oxygen.  Patient received intravenous tocilizumab.  Pulmonary service was consulted.  Later on patient required transfer to intensive care unit for closer monitoring and use of noninvasive ventilatory therapy.  In intensive care unit, patient is maintained on Vapotherm oxygen which is slowly being weaned.  Patient is transferred back to medicine telemetry floor for further oxygen weaning. Improved over the course of hosp stay. Pulmonology was  "following. Improved and was on 3 L nasal canula oxygen. Had home o2 evaluation and medically stable for discharge with home oxygen.          Goals of Care Treatment Preferences:  Code Status: Full Code      Consults:   Consults (From admission, onward)        Status Ordering Provider     Inpatient consult to Pulmonology  Once        Provider:  (Not yet assigned)    Completed ARMOND DOYLE     Inpatient virtual consult to Hospital Medicine  Once        Provider:  (Not yet assigned)    Completed JUSTO HUTCHINSON          No new Assessment & Plan notes have been filed under this hospital service since the last note was generated.  Service: Hospital Medicine    Final Active Diagnoses:    Diagnosis Date Noted POA    PRINCIPAL PROBLEM:  Pneumonia due to COVID-19 virus [U07.1, J12.82] 01/05/2022 Yes    Acute respiratory failure with hypoxia [J96.01] 01/05/2022 Yes    Seizures [R56.9] 01/05/2022 Yes    Hyperlipidemia [E78.5]  Yes      Problems Resolved During this Admission:       Discharged Condition: stable    Disposition: Home or Self Care    Follow Up:    Patient Instructions:      OXYGEN FOR HOME USE     Order Specific Question Answer Comments   Liter Flow 2    Duration Continuous    Qualifying Test Performed at: Activity    Oxygen saturation at rest 90    Oxygen saturation with activity 84    Oxygen saturation with activity on oxygen 93    Portable mode: continuous    Route nasal cannula    Device: home concentrator with portable tanks    Length of need (in months): 3 mos    Patient condition with qualifying saturation Other - List qualifying diagnosis and code    Select a diagnosis & list the code in the comments Pneumonia due to COVID-19 virus [[id number]]    Height: 5' 9" (1.753 m)    Weight: 80.6 kg (177 lb 11.1 oz)    Alternative treatment measures have been tried or considered and deemed clinically ineffective. Yes      COVID-19 Surveillance Program     Order Specific Question Answer Comments   Does patient " 69.4 kg (152 lb 14.4 oz)     General:  Alert and lucid.  Overall well appearing.  Comfortable.  No acute distress.  HEENT:  Normocephalic, atraumatic. Sclerae anicteric. Left nasal mucosae is erythematous with polyps.  Respiratory:  Normal effort.   Musculoskeletal:  Normal gait. Nontender spine. Normal ROM to flexion/extension at waist.  Neurologic:  Alert and lucid.  Normal comprehension of spoken language.  Moving all four extremities.  Psychiatric:  Normal judgment, insight and affect.  Skin:  No rashes or lesions visible.    LABS/STUDIES:  Pertinent labs reviewed.    ASSESSMENT & PLAN:    Chronic midline low back pain without sciatica  Chronic SI joint pain  Seems consistent with mechanical low back pain. Will get X rays to evaluate for spondylitis.  - XR SACROILIAC JTS <3 VW; Future  - XR LUMBAR SPINE 2 OR 3 VIEWS; Future    Chronic fatigue  Could be due to anxiety. Lab eval as below. Sleep medicine referral if labs are nondiagnostic.  - CBC WITH DIFFERENTIAL; Future  - THYROID STIMULATING HORMONE REFLEX; Future  - VITAMIN B12; Future  - MAGNESIUM; Future  - COMPREHENSIVE METABOLIC PANEL; Future  - COMPREHENSIVE METABOLIC PANEL  - MAGNESIUM  - VITAMIN B12  - THYROID STIMULATING HORMONE REFLEX  - CBC WITH DIFFERENTIAL    Epistaxis and rhinitis  Refractory to conservative management. With nasal polyps.  - ENT referral      Orders Placed This Encounter   • XR SACROILIAC JTS <3 VW   • XR LUMBAR SPINE 2 OR 3 VIEWS   • CBC with Automated Differential   • Thyroid Stimulating Hormone Reflex   • Vitamin B12   • Magnesium   • Comprehensive Metabolic Panel   • CBC with Automated Differential (performable only)   • SERVICE TO ENT       Return in about 2 months (around 10/16/2022).    The patient indicated understanding of the diagnosis and agreed with the plan of care.    Instructions provided as documented in the after visit summary.  __________________  Not addressed today (copied from prior notes, potentially with  have a smartphone? Yes    Does patient have the [facility] on their smartphone? Yes    While in surveillance program, will patient be using home oxygen? Yes      Activity as tolerated       Significant Diagnostic Studies: Labs: BMP: No results for input(s): GLU, NA, K, CL, CO2, BUN, CREATININE, CALCIUM, MG in the last 48 hours. and CBC No results for input(s): WBC, HGB, HCT, PLT in the last 48 hours.  Microbiology: Blood Culture No results found for: LABBLOO, Sputum Culture No results found for: GSRESP, RESPIRATORYC and Urine Culture  No results found for: LABURIN    Pending Diagnostic Studies:     None         Medications:  Reconciled Home Medications:      Medication List      START taking these medications    ascorbic acid (vitamin C) 500 MG tablet  Commonly known as: VITAMIN C  Take 1 tablet (500 mg total) by mouth 2 (two) times daily.     guaiFENesin 600 mg 12 hr tablet  Commonly known as: MUCINEX  Take 1 tablet (600 mg total) by mouth 2 (two) times daily. for 10 days     multivitamin Tab  Take 1 tablet by mouth once daily.     pulse oximeter device  Commonly known as: pulse oximeter  by Apply Externally route 2 (two) times a day. Use twice daily at 8 AM and 3 PM and record the value in [facility] as directed.     zinc 50 mg Tab  Take 1 tablet by mouth once daily.        CONTINUE taking these medications    levETIRAcetam 500 MG Tab  Commonly known as: KEPPRA     ONE DAILY MULTIVITAMIN per tablet  Generic drug: multivitamin  Take 1 tablet by mouth once daily.     simvastatin 80 MG tablet  Commonly known as: ZOCOR  Take 40 mg by mouth nightly.        STOP taking these medications    ALPRAZolam 0.25 MG tablet  Commonly known as: XANAX            Indwelling Lines/Drains at time of discharge:   Lines/Drains/Airways     None                 Time spent on the discharge of patient: 60 minutes         Hina Lopez MD  Department of Hospital Medicine  [facility]   updates):  Anxiety and depression  Depressive symptoms are gone. No suicidal ideation, which was a problem in the past.  -Continue escitalopram 10 mg daily, DECREASE: quetiapine 25 mg nightly, continue hydroxyzine 25 mg t.i.d. p.r.n.  --Establish with a counselor as recommended    Seborrheic dermatitis of scalp  s/p lidex. Improving  - ketoconazole shampoo for maintenance

## 2022-11-11 ENCOUNTER — OFFICE VISIT (OUTPATIENT)
Dept: URGENT CARE | Facility: CLINIC | Age: 57
End: 2022-11-11
Payer: COMMERCIAL

## 2022-11-11 VITALS
HEART RATE: 87 BPM | SYSTOLIC BLOOD PRESSURE: 142 MMHG | TEMPERATURE: 98 F | RESPIRATION RATE: 16 BRPM | WEIGHT: 193 LBS | BODY MASS INDEX: 28.58 KG/M2 | HEIGHT: 69 IN | OXYGEN SATURATION: 97 % | DIASTOLIC BLOOD PRESSURE: 86 MMHG

## 2022-11-11 DIAGNOSIS — R89.4 INFLUENZA A VIRUS NOT DETECTED: ICD-10-CM

## 2022-11-11 DIAGNOSIS — J06.9 VIRAL URI: ICD-10-CM

## 2022-11-11 DIAGNOSIS — Z20.822 COVID-19 VIRUS NOT DETECTED: ICD-10-CM

## 2022-11-11 DIAGNOSIS — G44.209 ACUTE NON INTRACTABLE TENSION-TYPE HEADACHE: Primary | ICD-10-CM

## 2022-11-11 DIAGNOSIS — Z86.69 HISTORY OF SEIZURE DISORDER: ICD-10-CM

## 2022-11-11 PROBLEM — D18.03 HEMANGIOMA OF LIVER: Status: ACTIVE | Noted: 2022-11-11

## 2022-11-11 PROBLEM — G40.909 EPILEPSY: Status: ACTIVE | Noted: 2022-11-11

## 2022-11-11 PROBLEM — E78.5 DYSLIPIDEMIA: Status: ACTIVE | Noted: 2022-11-11

## 2022-11-11 LAB
CTP QC/QA: YES
CTP QC/QA: YES
FLUAV AG NPH QL: NEGATIVE
FLUBV AG NPH QL: NEGATIVE
SARS-COV-2 AG RESP QL IA.RAPID: NEGATIVE

## 2022-11-11 PROCEDURE — 3077F SYST BP >= 140 MM HG: CPT | Mod: CPTII,S$GLB,, | Performed by: NURSE PRACTITIONER

## 2022-11-11 PROCEDURE — 87804 POCT INFLUENZA A/B: ICD-10-PCS | Mod: QW,,, | Performed by: NURSE PRACTITIONER

## 2022-11-11 PROCEDURE — 3079F PR MOST RECENT DIASTOLIC BLOOD PRESSURE 80-89 MM HG: ICD-10-PCS | Mod: CPTII,S$GLB,, | Performed by: NURSE PRACTITIONER

## 2022-11-11 PROCEDURE — 1160F RVW MEDS BY RX/DR IN RCRD: CPT | Mod: CPTII,S$GLB,, | Performed by: NURSE PRACTITIONER

## 2022-11-11 PROCEDURE — 87811 SARS CORONAVIRUS 2 ANTIGEN POCT, MANUAL READ: ICD-10-PCS | Mod: QW,S$GLB,, | Performed by: NURSE PRACTITIONER

## 2022-11-11 PROCEDURE — 87804 INFLUENZA ASSAY W/OPTIC: CPT | Mod: QW,,, | Performed by: NURSE PRACTITIONER

## 2022-11-11 PROCEDURE — 1159F MED LIST DOCD IN RCRD: CPT | Mod: CPTII,S$GLB,, | Performed by: NURSE PRACTITIONER

## 2022-11-11 PROCEDURE — 87811 SARS-COV-2 COVID19 W/OPTIC: CPT | Mod: QW,S$GLB,, | Performed by: NURSE PRACTITIONER

## 2022-11-11 PROCEDURE — 99204 PR OFFICE/OUTPT VISIT, NEW, LEVL IV, 45-59 MIN: ICD-10-PCS | Mod: S$GLB,,, | Performed by: NURSE PRACTITIONER

## 2022-11-11 PROCEDURE — 3008F BODY MASS INDEX DOCD: CPT | Mod: CPTII,S$GLB,, | Performed by: NURSE PRACTITIONER

## 2022-11-11 PROCEDURE — 3008F PR BODY MASS INDEX (BMI) DOCUMENTED: ICD-10-PCS | Mod: CPTII,S$GLB,, | Performed by: NURSE PRACTITIONER

## 2022-11-11 PROCEDURE — 1159F PR MEDICATION LIST DOCUMENTED IN MEDICAL RECORD: ICD-10-PCS | Mod: CPTII,S$GLB,, | Performed by: NURSE PRACTITIONER

## 2022-11-11 PROCEDURE — 3079F DIAST BP 80-89 MM HG: CPT | Mod: CPTII,S$GLB,, | Performed by: NURSE PRACTITIONER

## 2022-11-11 PROCEDURE — 99204 OFFICE O/P NEW MOD 45 MIN: CPT | Mod: S$GLB,,, | Performed by: NURSE PRACTITIONER

## 2022-11-11 PROCEDURE — 1160F PR REVIEW ALL MEDS BY PRESCRIBER/CLIN PHARMACIST DOCUMENTED: ICD-10-PCS | Mod: CPTII,S$GLB,, | Performed by: NURSE PRACTITIONER

## 2022-11-11 PROCEDURE — 3077F PR MOST RECENT SYSTOLIC BLOOD PRESSURE >= 140 MM HG: ICD-10-PCS | Mod: CPTII,S$GLB,, | Performed by: NURSE PRACTITIONER

## 2022-11-11 RX ORDER — MONTELUKAST SODIUM 10 MG/1
10 TABLET ORAL NIGHTLY
Qty: 30 TABLET | Refills: 0 | Status: SHIPPED | OUTPATIENT
Start: 2022-11-11 | End: 2022-12-11

## 2022-11-11 RX ORDER — FLUTICASONE PROPIONATE 50 MCG
1 SPRAY, SUSPENSION (ML) NASAL DAILY
Qty: 15.8 ML | Refills: 0 | Status: SHIPPED | OUTPATIENT
Start: 2022-11-11 | End: 2023-04-25

## 2022-11-11 RX ORDER — CETIRIZINE HYDROCHLORIDE 10 MG/1
10 TABLET ORAL DAILY
Qty: 30 TABLET | Refills: 0 | Status: SHIPPED | OUTPATIENT
Start: 2022-11-11 | End: 2024-03-06

## 2022-11-11 NOTE — PROGRESS NOTES
"Subjective:       Patient ID: Stefano Donahue Jr. is a 57 y.o. male.    Vitals:  height is 5' 9" (1.753 m) and weight is 87.5 kg (193 lb). His temperature is 98 °F (36.7 °C). His blood pressure is 142/86 (abnormal) and his pulse is 87. His respiration is 16 and oxygen saturation is 97%.     Chief Complaint: Headache    Pt states he is having ear fullness, headache, sore throat X 1 day. Denies any cough/congestion.     Constitution: Negative for activity change, appetite change, chills, sweating, fatigue and fever.   HENT:  Positive for ear pain (Bilateral fullness), congestion (Denies nasal drainage), sinus pressure and sore throat.    Respiratory:  Negative for chest tightness, cough, shortness of breath and wheezing.    Gastrointestinal:  Negative for abdominal pain, nausea, vomiting and diarrhea.   Neurological:  Positive for dizziness (Mild intermittent vertigo-like dizziness lasting just a few seconds with head position change), history of vertigo and headaches (Back of head bilaterally).     Objective:      Physical Exam   Constitutional: He is oriented to person, place, and time. He appears well-developed.  Non-toxic appearance. He does not appear ill. No distress.   HENT:   Head: Normocephalic and atraumatic.   Ears:   Right Ear: External ear and ear canal normal. Tympanic membrane is bulging. Tympanic membrane is not erythematous.   Left Ear: External ear and ear canal normal. Tympanic membrane is bulging. Tympanic membrane is not erythematous.   Nose: Nose normal.   Mouth/Throat: Mucous membranes are moist. Posterior oropharyngeal erythema present. No oropharyngeal exudate.   Eyes: Conjunctivae and EOM are normal.   Neck: Neck supple. No neck rigidity present.   Cardiovascular: Normal rate, regular rhythm and normal heart sounds.   Pulmonary/Chest: Effort normal and breath sounds normal. No respiratory distress. He has no wheezes. He has no rhonchi. He has no rales.   Abdominal: Normal appearance. "   Musculoskeletal:      Cervical back: He exhibits no tenderness.   Lymphadenopathy:     He has no cervical adenopathy.   Neurological: no focal deficit. He is alert and oriented to person, place, and time.   Skin: Skin is warm and dry. Capillary refill takes 2 to 3 seconds.   Psychiatric: His behavior is normal. Mood normal.   Nursing note and vitals reviewed.      Assessment:       1. Acute non intractable tension-type headache    2. COVID-19 virus not detected    3. Influenza A virus not detected    4. History of seizure disorder    5. Viral URI        Flu B negative    Plan:         Acute non intractable tension-type headache  -     SARS Coronavirus 2 Antigen, POCT Manual Read  -     POCT Influenza A/B    COVID-19 virus not detected    Influenza A virus not detected    History of seizure disorder    Viral URI  -     montelukast (SINGULAIR) 10 mg tablet; Take 1 tablet (10 mg total) by mouth every evening.  Dispense: 30 tablet; Refill: 0  -     fluticasone propionate (FLONASE) 50 mcg/actuation nasal spray; 1 spray (50 mcg total) by Each Nostril route once daily.  Dispense: 15.8 mL; Refill: 0  -     cetirizine (ZYRTEC) 10 MG tablet; Take 1 tablet (10 mg total) by mouth once daily.  Dispense: 30 tablet; Refill: 0       Flonase daily  Zyrtec daily  Singulair daily  Please read the information provided regarding Singulair and the potential side effects.  If he would start to develop any of the side effects stop taking this medication immediately  Advised to retest for COVID tomorrow and if negative retest again the next day.

## 2022-11-11 NOTE — PATIENT INSTRUCTIONS
Flonase daily  Zyrtec daily  Singulair daily  Please read the information provided regarding Singulair and the potential side effects.  If he would start to develop any of the side effects stop taking this medication immediately  Advised to retest for COVID tomorrow and if negative retest again the next day.

## 2022-12-28 ENCOUNTER — PATIENT MESSAGE (OUTPATIENT)
Dept: FAMILY MEDICINE | Facility: CLINIC | Age: 57
End: 2022-12-28
Payer: COMMERCIAL

## 2022-12-28 ENCOUNTER — OFFICE VISIT (OUTPATIENT)
Dept: URGENT CARE | Facility: CLINIC | Age: 57
End: 2022-12-28
Payer: COMMERCIAL

## 2022-12-28 ENCOUNTER — TELEPHONE (OUTPATIENT)
Dept: FAMILY MEDICINE | Facility: CLINIC | Age: 57
End: 2022-12-28
Payer: COMMERCIAL

## 2022-12-28 VITALS
HEIGHT: 69 IN | HEART RATE: 83 BPM | SYSTOLIC BLOOD PRESSURE: 163 MMHG | BODY MASS INDEX: 29.18 KG/M2 | RESPIRATION RATE: 16 BRPM | OXYGEN SATURATION: 97 % | DIASTOLIC BLOOD PRESSURE: 95 MMHG | WEIGHT: 197 LBS | TEMPERATURE: 99 F

## 2022-12-28 DIAGNOSIS — R05.9 COUGH, UNSPECIFIED TYPE: Primary | ICD-10-CM

## 2022-12-28 DIAGNOSIS — U07.1 COVID: ICD-10-CM

## 2022-12-28 LAB
CTP QC/QA: YES
CTP QC/QA: YES
FLUAV AG NPH QL: NEGATIVE
FLUBV AG NPH QL: NEGATIVE
SARS-COV-2 AG RESP QL IA.RAPID: POSITIVE

## 2022-12-28 PROCEDURE — 87804 POCT INFLUENZA A/B: ICD-10-PCS | Mod: 59,QW,,

## 2022-12-28 PROCEDURE — 87811 SARS CORONAVIRUS 2 ANTIGEN POCT, MANUAL READ: ICD-10-PCS | Mod: QW,S$GLB,,

## 2022-12-28 PROCEDURE — 1160F RVW MEDS BY RX/DR IN RCRD: CPT | Mod: CPTII,S$GLB,,

## 2022-12-28 PROCEDURE — 87811 SARS-COV-2 COVID19 W/OPTIC: CPT | Mod: QW,S$GLB,,

## 2022-12-28 PROCEDURE — 3077F SYST BP >= 140 MM HG: CPT | Mod: CPTII,S$GLB,,

## 2022-12-28 PROCEDURE — 1159F PR MEDICATION LIST DOCUMENTED IN MEDICAL RECORD: ICD-10-PCS | Mod: CPTII,S$GLB,,

## 2022-12-28 PROCEDURE — 87804 INFLUENZA ASSAY W/OPTIC: CPT | Mod: QW,,,

## 2022-12-28 PROCEDURE — 1159F MED LIST DOCD IN RCRD: CPT | Mod: CPTII,S$GLB,,

## 2022-12-28 PROCEDURE — 99214 OFFICE O/P EST MOD 30 MIN: CPT | Mod: S$GLB,,,

## 2022-12-28 PROCEDURE — 1160F PR REVIEW ALL MEDS BY PRESCRIBER/CLIN PHARMACIST DOCUMENTED: ICD-10-PCS | Mod: CPTII,S$GLB,,

## 2022-12-28 PROCEDURE — 3077F PR MOST RECENT SYSTOLIC BLOOD PRESSURE >= 140 MM HG: ICD-10-PCS | Mod: CPTII,S$GLB,,

## 2022-12-28 PROCEDURE — 99214 PR OFFICE/OUTPT VISIT, EST, LEVL IV, 30-39 MIN: ICD-10-PCS | Mod: S$GLB,,,

## 2022-12-28 PROCEDURE — 3080F PR MOST RECENT DIASTOLIC BLOOD PRESSURE >= 90 MM HG: ICD-10-PCS | Mod: CPTII,S$GLB,,

## 2022-12-28 PROCEDURE — 3008F PR BODY MASS INDEX (BMI) DOCUMENTED: ICD-10-PCS | Mod: CPTII,S$GLB,,

## 2022-12-28 PROCEDURE — 3008F BODY MASS INDEX DOCD: CPT | Mod: CPTII,S$GLB,,

## 2022-12-28 PROCEDURE — 3080F DIAST BP >= 90 MM HG: CPT | Mod: CPTII,S$GLB,,

## 2022-12-28 RX ORDER — ALBUTEROL SULFATE 90 UG/1
2 AEROSOL, METERED RESPIRATORY (INHALATION) EVERY 6 HOURS PRN
Qty: 18 G | Refills: 0 | Status: SHIPPED | OUTPATIENT
Start: 2022-12-28 | End: 2023-04-25

## 2022-12-28 NOTE — TELEPHONE ENCOUNTER
On call message. Dr. Ayon is out of the office. Please advise. Thank you.      Additional Information: Pt's wife called and said the pt tested positive for covid today 12/28/2022 at [facility]. He was hospitalized last year with COVID. [facility] suggested that the pt reach out to his PCP to start paxlovid therapy.

## 2022-12-28 NOTE — PROGRESS NOTES
"Subjective:       Patient ID: Stefano Donahue Jr. is a 57 y.o. male.    Vitals:  height is 5' 9" (1.753 m) and weight is 89.4 kg (197 lb). His temperature is 98.7 °F (37.1 °C). His blood pressure is 163/95 (abnormal) and his pulse is 83. His respiration is 16 and oxygen saturation is 97%.     Chief Complaint: Cough (Congestion and Sore throat )    Cough  This is a new problem. The current episode started yesterday. The problem has been gradually worsening. The problem occurs constantly. The cough is Productive of sputum. Associated symptoms include headaches, nasal congestion and a sore throat. Pertinent negatives include no chest pain, chills, ear pain, fever, postnasal drip or shortness of breath. He has tried OTC cough suppressant for the symptoms. The treatment provided no relief.     Constitution: Negative for activity change, appetite change, chills, sweating, fever and unexpected weight change.   HENT:  Positive for sore throat. Negative for ear pain, postnasal drip, sinus pain and sinus pressure.    Cardiovascular:  Negative for chest pain.   Eyes:  Negative for blurred vision.   Respiratory:  Negative for chest tightness and shortness of breath.    Gastrointestinal:  Negative for abdominal pain.   Neurological:  Positive for headaches. Negative for dizziness, history of vertigo and altered mental status.   Psychiatric/Behavioral:  Negative for altered mental status.      Objective:      Physical Exam   Constitutional: He is oriented to person, place, and time.  Non-toxic appearance. He does not appear ill. No distress.   Eyes: Conjunctivae are normal. Extraocular movement intact   Cardiovascular: Normal rate, normal heart sounds and normal pulses.   Pulmonary/Chest: Effort normal and breath sounds normal. No respiratory distress. He has no wheezes. He has no rhonchi.   Neurological: no focal deficit. He is alert and oriented to person, place, and time. He displays no dysarthria. Gait normal. GCS eye " subscore is 4. GCS verbal subscore is 5. GCS motor subscore is 6.   Skin: Skin is not diaphoretic. Capillary refill takes 2 to 3 seconds.   Psychiatric: His behavior is normal. Mood normal.       Assessment:       1. Cough, unspecified type    2. COVID          Plan:         Cough, unspecified type  -     SARS Coronavirus 2 Antigen, POCT Manual Read  -     POCT Influenza A/B    COVID  -     albuterol (PROVENTIL/VENTOLIN HFA) 90 mcg/actuation inhaler; Inhale 2 puffs into the lungs every 6 (six) hours as needed for Wheezing. Rescue  Dispense: 18 g; Refill: 0         1  Pt presents with sore throat and headache, covid positive, Covid risk score 1, pt was admitted to hospital with covid on 01/05/2022, had to be placed on CPAP for Respiratory failure, is followed by pulmonology as not had issues since, instructed pt to reach out to his pulmonologist to further discuss paxlovid therapy, pt AST and ALT elevated and paxlovid could potentially make this worse, pt covid risk score is only 1 but given poor outcome with previous covid infection may benefit from paxlovid therapy. Patient only symptoms are headache and sore throat. Lungs clear on exam, denies sob or cp. Will continue supportive treatment.

## 2022-12-28 NOTE — PATIENT INSTRUCTIONS
Symptomatic treatment to include:    Rest, increase fluid intake to include electrolyte replacement  Ibuprofen/Tylenol as directed for fever, sore throat, body aches  Zrytec and flonase for sinus symptoms  Tessalon perles cough pills as needed day or night  Mucinex D over the counter as directed for sinus congestion.  Coricidin HBP if you have high blood pressure.  Warm, salt water gargles, over the counter throat lozenges or sprays as desires.   ER for difficulty breathing not relieved by rest, excessive lethargy and/or change in mental status, oxygen level less than 93% or worsening of symptoms.   Follow CDC isolation guidelines as provided  Carry rescue inhaler with you at all times, use as needed for any wheezing   Reach out to your pulmonologist or PCP to discuss possibly starting Paxlovid therapy. This medication must be started within five days of symptoms onset.   Follow up with your PCP if symptoms persist.

## 2022-12-28 NOTE — TELEPHONE ENCOUNTER
----- Message from Colleen Allison sent at 12/28/2022  3:44 PM CST -----  Contact: Pt's wife/ Syl@ [phone]  Type:  Needs Medical Advice    Who Called: Pt's wife/ Syl @ [phone]  Symptoms (please be specific): Covid   How long has patient had these symptoms: 12/27/2022  Would the patient rather a call back or a response via [facility]? call  Best Call Back Number: [phone] or [phone]   Additional Information: Pt's wife called and said the pt tested positive for covid today 12/28/2022 at [facility]. He was hospitalized last year with COVID. [facility] suggested that the pt reach out to his PCP to start paxlovid therapy. Please call pt or his spouse back to advise.

## 2023-01-03 ENCOUNTER — TELEPHONE (OUTPATIENT)
Dept: FAMILY MEDICINE | Facility: CLINIC | Age: 58
End: 2023-01-03
Payer: COMMERCIAL

## 2023-01-03 NOTE — TELEPHONE ENCOUNTER
----- Message from Mile Christian sent at 1/3/2023  4:10 PM CST -----  Regarding: Reschedule  Contact: Patient  Type:  Sooner Appointment Request    Caller is requesting a sooner appointment.  Caller declined first available appointment listed below.  Caller will not accept being placed on the waitlist and is requesting a message be sent to doctor.    Name of Caller:  Patient  When is the first available appointment?    Symptoms:    Best Call Back Number:  [phone] (home)   Additional Information:  Patient states he had an appointment for 12:00 and no one called. Please call to reschedule. Thanks!

## 2023-01-04 ENCOUNTER — OFFICE VISIT (OUTPATIENT)
Dept: FAMILY MEDICINE | Facility: CLINIC | Age: 58
End: 2023-01-04
Payer: COMMERCIAL

## 2023-01-04 VITALS
RESPIRATION RATE: 16 BRPM | TEMPERATURE: 98 F | BODY MASS INDEX: 28.24 KG/M2 | OXYGEN SATURATION: 96 % | WEIGHT: 190.69 LBS | DIASTOLIC BLOOD PRESSURE: 90 MMHG | HEART RATE: 74 BPM | SYSTOLIC BLOOD PRESSURE: 132 MMHG | HEIGHT: 69 IN

## 2023-01-04 DIAGNOSIS — J06.9 VIRAL URI: ICD-10-CM

## 2023-01-04 DIAGNOSIS — H65.03 BILATERAL ACUTE SEROUS OTITIS MEDIA, RECURRENCE NOT SPECIFIED: Primary | ICD-10-CM

## 2023-01-04 DIAGNOSIS — H69.93 DYSFUNCTION OF BOTH EUSTACHIAN TUBES: ICD-10-CM

## 2023-01-04 PROCEDURE — 1159F MED LIST DOCD IN RCRD: CPT | Mod: CPTII,S$GLB,, | Performed by: FAMILY MEDICINE

## 2023-01-04 PROCEDURE — 3008F BODY MASS INDEX DOCD: CPT | Mod: CPTII,S$GLB,, | Performed by: FAMILY MEDICINE

## 2023-01-04 PROCEDURE — 99999 PR PBB SHADOW E&M-EST. PATIENT-LVL IV: ICD-10-PCS | Mod: PBBFAC,,, | Performed by: FAMILY MEDICINE

## 2023-01-04 PROCEDURE — 3075F PR MOST RECENT SYSTOLIC BLOOD PRESS GE 130-139MM HG: ICD-10-PCS | Mod: CPTII,S$GLB,, | Performed by: FAMILY MEDICINE

## 2023-01-04 PROCEDURE — 99213 PR OFFICE/OUTPT VISIT, EST, LEVL III, 20-29 MIN: ICD-10-PCS | Mod: 25,S$GLB,, | Performed by: FAMILY MEDICINE

## 2023-01-04 PROCEDURE — 96372 PR INJECTION,THERAP/PROPH/DIAG2ST, IM OR SUBCUT: ICD-10-PCS | Mod: S$GLB,,, | Performed by: FAMILY MEDICINE

## 2023-01-04 PROCEDURE — 1159F PR MEDICATION LIST DOCUMENTED IN MEDICAL RECORD: ICD-10-PCS | Mod: CPTII,S$GLB,, | Performed by: FAMILY MEDICINE

## 2023-01-04 PROCEDURE — 3075F SYST BP GE 130 - 139MM HG: CPT | Mod: CPTII,S$GLB,, | Performed by: FAMILY MEDICINE

## 2023-01-04 PROCEDURE — 1160F PR REVIEW ALL MEDS BY PRESCRIBER/CLIN PHARMACIST DOCUMENTED: ICD-10-PCS | Mod: CPTII,S$GLB,, | Performed by: FAMILY MEDICINE

## 2023-01-04 PROCEDURE — 99999 PR PBB SHADOW E&M-EST. PATIENT-LVL IV: CPT | Mod: PBBFAC,,, | Performed by: FAMILY MEDICINE

## 2023-01-04 PROCEDURE — 1160F RVW MEDS BY RX/DR IN RCRD: CPT | Mod: CPTII,S$GLB,, | Performed by: FAMILY MEDICINE

## 2023-01-04 PROCEDURE — 3008F PR BODY MASS INDEX (BMI) DOCUMENTED: ICD-10-PCS | Mod: CPTII,S$GLB,, | Performed by: FAMILY MEDICINE

## 2023-01-04 PROCEDURE — 3080F DIAST BP >= 90 MM HG: CPT | Mod: CPTII,S$GLB,, | Performed by: FAMILY MEDICINE

## 2023-01-04 PROCEDURE — 3080F PR MOST RECENT DIASTOLIC BLOOD PRESSURE >= 90 MM HG: ICD-10-PCS | Mod: CPTII,S$GLB,, | Performed by: FAMILY MEDICINE

## 2023-01-04 PROCEDURE — 96372 THER/PROPH/DIAG INJ SC/IM: CPT | Mod: S$GLB,,, | Performed by: FAMILY MEDICINE

## 2023-01-04 PROCEDURE — 99213 OFFICE O/P EST LOW 20 MIN: CPT | Mod: 25,S$GLB,, | Performed by: FAMILY MEDICINE

## 2023-01-04 RX ORDER — FLUTICASONE PROPIONATE 50 MCG
1 SPRAY, SUSPENSION (ML) NASAL 2 TIMES DAILY
Qty: 18.2 ML | Refills: 1 | Status: SHIPPED | OUTPATIENT
Start: 2023-01-04 | End: 2023-04-25

## 2023-01-04 RX ORDER — BETAMETHASONE SODIUM PHOSPHATE AND BETAMETHASONE ACETATE 3; 3 MG/ML; MG/ML
9 INJECTION, SUSPENSION INTRA-ARTICULAR; INTRALESIONAL; INTRAMUSCULAR; SOFT TISSUE
Status: COMPLETED | OUTPATIENT
Start: 2023-01-04 | End: 2023-01-04

## 2023-01-04 RX ADMIN — BETAMETHASONE SODIUM PHOSPHATE AND BETAMETHASONE ACETATE 9 MG: 3; 3 INJECTION, SUSPENSION INTRA-ARTICULAR; INTRALESIONAL; INTRAMUSCULAR; SOFT TISSUE at 03:01

## 2023-01-04 NOTE — PROGRESS NOTES
Patient verified by name and . Patient received 9mg Celestone in right Ventrogluteal. Patient tolerated injection well. Patient advised to wait in clinic for 15 minutes in case of adverse reactions. Patient demonstrated understanding.

## 2023-01-04 NOTE — PROGRESS NOTES
Subjective:       Patient ID: Stefano Donahue Jr. is a 57 y.o. male.    Chief Complaint: Ear Fullness (Bilateral x saturday)    New to me patient here for UC visit.  CC- ears stopped up; feels fluid; some imbalance and nasal congestion.  Dx w Covid  12/28; Rx'ed Paxlovid and took it x 2 days only as he felt bad on it.  All other acute sx's have resolved.    Ear Fullness   Pertinent negatives include no abdominal pain or rash.   Review of Systems   Constitutional:  Negative for fever.   Respiratory:  Negative for shortness of breath.    Cardiovascular:  Negative for chest pain.   Gastrointestinal:  Negative for abdominal pain and nausea.   Skin:  Negative for rash.   Neurological:  Negative for numbness.   All other systems reviewed and are negative.    Objective:      Physical Exam  Constitutional:       General: He is not in acute distress.     Appearance: He is well-developed.   HENT:      Right Ear: A middle ear effusion is present.      Left Ear: A middle ear effusion is present.      Nose: Mucosal edema and congestion present.      Mouth/Throat:      Pharynx: Posterior oropharyngeal erythema present.   Cardiovascular:      Rate and Rhythm: Normal rate and regular rhythm.      Heart sounds: No murmur heard.  Pulmonary:      Effort: Pulmonary effort is normal.      Breath sounds: Normal breath sounds. No wheezing or rales.   Musculoskeletal:      Cervical back: Neck supple.   Lymphadenopathy:      Cervical: No cervical adenopathy.   Skin:     General: Skin is warm and dry.       Assessment:       1. Bilateral acute serous otitis media, recurrence not specified    2. Viral URI    3. Dysfunction of both eustachian tubes          Plan:       Bilateral acute serous otitis media, recurrence not specified  -     fluticasone propionate (FLONASE) 50 mcg/actuation nasal spray; 1 spray (50 mcg total) by Each Nostril route 2 (two) times a day.  Dispense: 18.2 mL; Refill: 1  -     betamethasone acetate-betamethasone sodium  phosphate injection 9 mg    Viral URI    Dysfunction of both eustachian tubes  -     fluticasone propionate (FLONASE) 50 mcg/actuation nasal spray; 1 spray (50 mcg total) by Each Nostril route 2 (two) times a day.  Dispense: 18.2 mL; Refill: 1  -     betamethasone acetate-betamethasone sodium phosphate injection 9 mg      Patient Instructions   Take both Benadryl 25 mg at bedtime and either Zyrtec or Claritin once a day in AM

## 2023-01-04 NOTE — PATIENT INSTRUCTIONS
Take both Benadryl 25 mg at bedtime and either Zyrtec or Claritin once a day in AM    
Additional Complaints

## 2023-01-18 ENCOUNTER — TELEPHONE (OUTPATIENT)
Dept: FAMILY MEDICINE | Facility: CLINIC | Age: 58
End: 2023-01-18
Payer: COMMERCIAL

## 2023-01-24 ENCOUNTER — OFFICE VISIT (OUTPATIENT)
Dept: FAMILY MEDICINE | Facility: CLINIC | Age: 58
End: 2023-01-24
Payer: COMMERCIAL

## 2023-01-24 VITALS
TEMPERATURE: 98 F | OXYGEN SATURATION: 98 % | BODY MASS INDEX: 28.31 KG/M2 | WEIGHT: 191.13 LBS | HEART RATE: 64 BPM | SYSTOLIC BLOOD PRESSURE: 132 MMHG | HEIGHT: 69 IN | DIASTOLIC BLOOD PRESSURE: 88 MMHG

## 2023-01-24 DIAGNOSIS — Z12.11 SCREEN FOR COLON CANCER: ICD-10-CM

## 2023-01-24 DIAGNOSIS — R06.09 DOE (DYSPNEA ON EXERTION): ICD-10-CM

## 2023-01-24 DIAGNOSIS — E78.5 HYPERLIPIDEMIA, UNSPECIFIED HYPERLIPIDEMIA TYPE: Primary | ICD-10-CM

## 2023-01-24 DIAGNOSIS — Z82.49 FAMILY HISTORY OF CAROTID ARTERY STENOSIS: ICD-10-CM

## 2023-01-24 PROCEDURE — 1159F PR MEDICATION LIST DOCUMENTED IN MEDICAL RECORD: ICD-10-PCS | Mod: CPTII,S$GLB,, | Performed by: PHYSICIAN ASSISTANT

## 2023-01-24 PROCEDURE — 99213 OFFICE O/P EST LOW 20 MIN: CPT | Mod: S$GLB,,, | Performed by: PHYSICIAN ASSISTANT

## 2023-01-24 PROCEDURE — 3075F SYST BP GE 130 - 139MM HG: CPT | Mod: CPTII,S$GLB,, | Performed by: PHYSICIAN ASSISTANT

## 2023-01-24 PROCEDURE — 3079F PR MOST RECENT DIASTOLIC BLOOD PRESSURE 80-89 MM HG: ICD-10-PCS | Mod: CPTII,S$GLB,, | Performed by: PHYSICIAN ASSISTANT

## 2023-01-24 PROCEDURE — 99213 PR OFFICE/OUTPT VISIT, EST, LEVL III, 20-29 MIN: ICD-10-PCS | Mod: S$GLB,,, | Performed by: PHYSICIAN ASSISTANT

## 2023-01-24 PROCEDURE — 99999 PR PBB SHADOW E&M-EST. PATIENT-LVL IV: CPT | Mod: PBBFAC,,, | Performed by: PHYSICIAN ASSISTANT

## 2023-01-24 PROCEDURE — 99999 PR PBB SHADOW E&M-EST. PATIENT-LVL IV: ICD-10-PCS | Mod: PBBFAC,,, | Performed by: PHYSICIAN ASSISTANT

## 2023-01-24 PROCEDURE — 3079F DIAST BP 80-89 MM HG: CPT | Mod: CPTII,S$GLB,, | Performed by: PHYSICIAN ASSISTANT

## 2023-01-24 PROCEDURE — 3008F PR BODY MASS INDEX (BMI) DOCUMENTED: ICD-10-PCS | Mod: CPTII,S$GLB,, | Performed by: PHYSICIAN ASSISTANT

## 2023-01-24 PROCEDURE — 3075F PR MOST RECENT SYSTOLIC BLOOD PRESS GE 130-139MM HG: ICD-10-PCS | Mod: CPTII,S$GLB,, | Performed by: PHYSICIAN ASSISTANT

## 2023-01-24 PROCEDURE — 3008F BODY MASS INDEX DOCD: CPT | Mod: CPTII,S$GLB,, | Performed by: PHYSICIAN ASSISTANT

## 2023-01-24 PROCEDURE — 1159F MED LIST DOCD IN RCRD: CPT | Mod: CPTII,S$GLB,, | Performed by: PHYSICIAN ASSISTANT

## 2023-01-24 NOTE — PROGRESS NOTES
Subjective:       Patient ID: Stefano Donahue Jr. is a 58 y.o. male.    Chief Complaint: Sress Test    Patient with Epilepsy, anxiety, hyperlipidemia presents for evaluation of dyspnea on exertion and heart palpitation.  States that he is had the symptoms since having COVID 1 year ago and also several weeks ago.  Feels that he becomes more short of breath with exertion than he used to.  He exercises on a regular basis and is having to stop due to dyspnea.  Once he recovers for short.  He returns to exercise.  Denies having any chest pain nausea vomiting or diaphoresis.  Denies any lightheadedness or syncope.  He is a family history of carotid artery disease in his sister who required endarterectomy.  He is compliant with statin medication.  He quit smoking approximately 10 years ago.  Patients patient medical/surgical, social and family histories have been reviewed       Review of Systems   Constitutional:  Negative for activity change, appetite change, fatigue and unexpected weight change.   Respiratory:  Positive for shortness of breath. Negative for apnea, cough, choking, chest tightness, wheezing and stridor.    Cardiovascular:  Negative for chest pain, palpitations and leg swelling.   Neurological:  Negative for dizziness, syncope, facial asymmetry, weakness, light-headedness, numbness and headaches.   Psychiatric/Behavioral:  Negative for dysphoric mood. The patient is not nervous/anxious.      Objective:      Physical Exam  Constitutional:       General: He is not in acute distress.     Appearance: He is well-developed.   HENT:      Head: Normocephalic and atraumatic.   Neck:      Vascular: No carotid bruit.   Cardiovascular:      Rate and Rhythm: Normal rate and regular rhythm.      Heart sounds: Normal heart sounds.   Pulmonary:      Effort: Pulmonary effort is normal.      Breath sounds: Normal breath sounds.   Musculoskeletal:      Cervical back: Neck supple. No tenderness.      Right lower leg: No edema.  "     Left lower leg: No edema.   Skin:     General: Skin is warm and dry.      Capillary Refill: Capillary refill takes less than 2 seconds.   Neurological:      General: No focal deficit present.      Mental Status: He is alert.   Psychiatric:         Mood and Affect: Mood normal.         Behavior: Behavior normal. Behavior is cooperative.         Judgment: Judgment normal.       Assessment:       1. Hyperlipidemia, unspecified hyperlipidemia type    2. BISWAS (dyspnea on exertion)    3. Family history of carotid artery stenosis    4. Screen for colon cancer          Plan:       Stefano was seen today for sress test.    Diagnoses and all orders for this visit:    Hyperlipidemia, unspecified hyperlipidemia type  -     Lipid Panel; Future  -     COMPREHENSIVE METABOLIC PANEL; Future  -     Stress Echo Which stress agent will be used? Treadmill Exercise; Color Flow Doppler? No; Future  -     US Carotid Bilateral; Future    BISWAS (dyspnea on exertion)  -     Stress Echo Which stress agent will be used? Treadmill Exercise; Color Flow Doppler? No; Future    Family history of carotid artery stenosis  -     US Carotid Bilateral; Future    Screen for colon cancer  -     Ambulatory referral/consult to Gastroenterology; Future           Follow up for fasting lab soon, us carotid and stress echo .       Dicussed risk factor modification - BP control, exercise, lipid control, blood sugar control, avoid smoking/alcohol       Documentation entered by me for this encounter may have been done in part using speech-recognition technology. Although I have made an effort to ensure accuracy, "sound like" errors may exist and should be interpreted in context.    "

## 2023-01-25 ENCOUNTER — HOSPITAL ENCOUNTER (OUTPATIENT)
Dept: RADIOLOGY | Facility: HOSPITAL | Age: 58
Discharge: HOME | End: 2023-01-25
Attending: PHYSICIAN ASSISTANT
Payer: COMMERCIAL

## 2023-01-25 ENCOUNTER — PATIENT MESSAGE (OUTPATIENT)
Dept: FAMILY MEDICINE | Facility: CLINIC | Age: 58
End: 2023-01-25
Payer: COMMERCIAL

## 2023-01-25 DIAGNOSIS — E78.5 HYPERLIPIDEMIA, UNSPECIFIED HYPERLIPIDEMIA TYPE: ICD-10-CM

## 2023-01-25 DIAGNOSIS — E78.5 HYPERLIPIDEMIA, UNSPECIFIED HYPERLIPIDEMIA TYPE: Primary | ICD-10-CM

## 2023-01-25 DIAGNOSIS — Z82.49 FAMILY HISTORY OF CAROTID ARTERY STENOSIS: ICD-10-CM

## 2023-01-25 PROCEDURE — 93880 US CAROTID BILATERAL: ICD-10-PCS | Mod: 26,,, | Performed by: RADIOLOGY

## 2023-01-25 PROCEDURE — 93880 EXTRACRANIAL BILAT STUDY: CPT | Mod: TC

## 2023-01-25 PROCEDURE — 93880 EXTRACRANIAL BILAT STUDY: CPT | Mod: 26,,, | Performed by: RADIOLOGY

## 2023-01-30 ENCOUNTER — CLINICAL SUPPORT (OUTPATIENT)
Dept: CARDIOLOGY | Facility: HOSPITAL | Age: 58
End: 2023-01-30
Attending: PHYSICIAN ASSISTANT
Payer: COMMERCIAL

## 2023-01-30 DIAGNOSIS — E78.5 HYPERLIPIDEMIA, UNSPECIFIED HYPERLIPIDEMIA TYPE: ICD-10-CM

## 2023-01-30 DIAGNOSIS — R06.09 DOE (DYSPNEA ON EXERTION): ICD-10-CM

## 2023-01-30 LAB
CV STRESS BASE HR: 66 BPM
DIASTOLIC BLOOD PRESSURE: 95 MMHG
EJECTION FRACTION: 60 %
OHS CV CPX 1 MINUTE RECOVERY HEART RATE: 122 BPM
OHS CV CPX 85 PERCENT MAX PREDICTED HEART RATE MALE: 136
OHS CV CPX ESTIMATED METS: 10
OHS CV CPX MAX PREDICTED HEART RATE: 160
OHS CV CPX PATIENT IS FEMALE: 0
OHS CV CPX PATIENT IS MALE: 1
OHS CV CPX PEAK DIASTOLIC BLOOD PRESSURE: 77 MMHG
OHS CV CPX PEAK HEAR RATE: 154 BPM
OHS CV CPX PEAK RATE PRESSURE PRODUCT: NORMAL
OHS CV CPX PEAK SYSTOLIC BLOOD PRESSURE: 226 MMHG
OHS CV CPX PERCENT MAX PREDICTED HEART RATE ACHIEVED: 96
OHS CV CPX RATE PRESSURE PRODUCT PRESENTING: 9636
STRESS ECHO POST EXERCISE DUR MIN: 7 MINUTES
STRESS ECHO POST EXERCISE DUR SEC: 42 SECONDS
SYSTOLIC BLOOD PRESSURE: 146 MMHG

## 2023-01-30 PROCEDURE — 93351 STRESS ECHO (CUPID ONLY): ICD-10-PCS | Mod: 26,,, | Performed by: GENERAL PRACTICE

## 2023-01-30 PROCEDURE — 93351 STRESS TTE COMPLETE: CPT | Mod: 26,,, | Performed by: GENERAL PRACTICE

## 2023-01-30 PROCEDURE — 93351 STRESS TTE COMPLETE: CPT

## 2023-01-31 ENCOUNTER — PATIENT MESSAGE (OUTPATIENT)
Dept: FAMILY MEDICINE | Facility: CLINIC | Age: 58
End: 2023-01-31
Payer: COMMERCIAL

## 2023-04-25 ENCOUNTER — OFFICE VISIT (OUTPATIENT)
Dept: FAMILY MEDICINE | Facility: CLINIC | Age: 58
End: 2023-04-25
Payer: COMMERCIAL

## 2023-04-25 VITALS
HEART RATE: 61 BPM | TEMPERATURE: 98 F | HEIGHT: 69 IN | OXYGEN SATURATION: 98 % | SYSTOLIC BLOOD PRESSURE: 130 MMHG | DIASTOLIC BLOOD PRESSURE: 80 MMHG | RESPIRATION RATE: 16 BRPM | BODY MASS INDEX: 28.11 KG/M2 | WEIGHT: 189.81 LBS

## 2023-04-25 DIAGNOSIS — H60.391 ACUTE INFECTIVE OTITIS EXTERNA, RIGHT: Primary | ICD-10-CM

## 2023-04-25 PROCEDURE — 1159F MED LIST DOCD IN RCRD: CPT | Mod: CPTII,S$GLB,, | Performed by: FAMILY MEDICINE

## 2023-04-25 PROCEDURE — 1159F PR MEDICATION LIST DOCUMENTED IN MEDICAL RECORD: ICD-10-PCS | Mod: CPTII,S$GLB,, | Performed by: FAMILY MEDICINE

## 2023-04-25 PROCEDURE — 99999 PR PBB SHADOW E&M-EST. PATIENT-LVL IV: ICD-10-PCS | Mod: PBBFAC,,, | Performed by: FAMILY MEDICINE

## 2023-04-25 PROCEDURE — 99213 PR OFFICE/OUTPT VISIT, EST, LEVL III, 20-29 MIN: ICD-10-PCS | Mod: S$GLB,,, | Performed by: FAMILY MEDICINE

## 2023-04-25 PROCEDURE — 99999 PR PBB SHADOW E&M-EST. PATIENT-LVL IV: CPT | Mod: PBBFAC,,, | Performed by: FAMILY MEDICINE

## 2023-04-25 PROCEDURE — 1160F PR REVIEW ALL MEDS BY PRESCRIBER/CLIN PHARMACIST DOCUMENTED: ICD-10-PCS | Mod: CPTII,S$GLB,, | Performed by: FAMILY MEDICINE

## 2023-04-25 PROCEDURE — 3008F BODY MASS INDEX DOCD: CPT | Mod: CPTII,S$GLB,, | Performed by: FAMILY MEDICINE

## 2023-04-25 PROCEDURE — 3075F PR MOST RECENT SYSTOLIC BLOOD PRESS GE 130-139MM HG: ICD-10-PCS | Mod: CPTII,S$GLB,, | Performed by: FAMILY MEDICINE

## 2023-04-25 PROCEDURE — 3008F PR BODY MASS INDEX (BMI) DOCUMENTED: ICD-10-PCS | Mod: CPTII,S$GLB,, | Performed by: FAMILY MEDICINE

## 2023-04-25 PROCEDURE — 3075F SYST BP GE 130 - 139MM HG: CPT | Mod: CPTII,S$GLB,, | Performed by: FAMILY MEDICINE

## 2023-04-25 PROCEDURE — 99213 OFFICE O/P EST LOW 20 MIN: CPT | Mod: S$GLB,,, | Performed by: FAMILY MEDICINE

## 2023-04-25 PROCEDURE — 3079F DIAST BP 80-89 MM HG: CPT | Mod: CPTII,S$GLB,, | Performed by: FAMILY MEDICINE

## 2023-04-25 PROCEDURE — 3079F PR MOST RECENT DIASTOLIC BLOOD PRESSURE 80-89 MM HG: ICD-10-PCS | Mod: CPTII,S$GLB,, | Performed by: FAMILY MEDICINE

## 2023-04-25 PROCEDURE — 1160F RVW MEDS BY RX/DR IN RCRD: CPT | Mod: CPTII,S$GLB,, | Performed by: FAMILY MEDICINE

## 2023-04-25 RX ORDER — CIPROFLOXACIN AND DEXAMETHASONE 3; 1 MG/ML; MG/ML
4 SUSPENSION/ DROPS AURICULAR (OTIC) 2 TIMES DAILY
Qty: 7.5 ML | Refills: 0 | Status: SHIPPED | OUTPATIENT
Start: 2023-04-25 | End: 2023-11-09

## 2023-04-25 NOTE — PROGRESS NOTES
Subjective:       Patient ID: Stefano Donahue Jr. is a 58 y.o. male.    Chief Complaint: No chief complaint on file.    Patient here for UC visit.  1 wk, right ear pain, sharp.  No fever or discharge.  No ST, congestion or left ear pain.      Review of Systems   Constitutional:  Negative for fever.   Respiratory:  Negative for shortness of breath.    Cardiovascular:  Negative for chest pain.   Gastrointestinal:  Negative for abdominal pain and nausea.   Skin:  Negative for rash.   Neurological:  Negative for numbness.   All other systems reviewed and are negative.    Objective:      Physical Exam  Vitals reviewed.   Constitutional:       Appearance: Normal appearance. He is not ill-appearing.   HENT:      Right Ear: Tympanic membrane normal.      Left Ear: Tympanic membrane and ear canal normal.      Ears:      Comments: Right EAC - focal erythema/papule at inf aspect.  TTP with otoscope     Nose: No congestion.   Musculoskeletal:      Cervical back: Neck supple.   Neurological:      Mental Status: He is alert.       Assessment:       1. Acute infective otitis externa, right        Plan:       Acute infective otitis externa, right  -     ciprofloxacin-dexAMETHasone 0.3-0.1% (CIPRODEX) 0.3-0.1 % DrpS; Place 4 drops into the right ear 2 (two) times daily.  Dispense: 7.5 mL; Refill: 0      Patient Instructions   Treat until pain free then add 1-2 days.    Contact your PCP if any worsening or for any new concerns as we discussed.

## 2023-04-25 NOTE — PATIENT INSTRUCTIONS
Treat until pain free then add 1-2 days.    Contact your PCP if any worsening or for any new concerns as we discussed.

## 2023-05-01 LAB
CHOLESTEROL, TOTAL: 139
HDLC SERPL-MCNC: 41 MG/DL (ref 35–70)
HEMOGLOBIN A1C: 5.8 % (ref ?–5.6)
LDLC SERPL CALC-MCNC: 85 MG/DL (ref 0–160)
TRIGL SERPL-MCNC: 65 MG/DL

## 2023-06-27 ENCOUNTER — TELEPHONE (OUTPATIENT)
Dept: FAMILY MEDICINE | Facility: CLINIC | Age: 58
End: 2023-06-27
Payer: COMMERCIAL

## 2023-06-27 ENCOUNTER — OFFICE VISIT (OUTPATIENT)
Dept: FAMILY MEDICINE | Facility: CLINIC | Age: 58
End: 2023-06-27
Payer: COMMERCIAL

## 2023-06-27 VITALS
HEIGHT: 69 IN | BODY MASS INDEX: 28.44 KG/M2 | WEIGHT: 192 LBS | RESPIRATION RATE: 18 BRPM | OXYGEN SATURATION: 96 % | TEMPERATURE: 98 F | DIASTOLIC BLOOD PRESSURE: 84 MMHG | HEART RATE: 72 BPM | SYSTOLIC BLOOD PRESSURE: 136 MMHG

## 2023-06-27 DIAGNOSIS — Z23 NEED FOR TDAP VACCINATION: ICD-10-CM

## 2023-06-27 DIAGNOSIS — F41.9 ANXIETY: ICD-10-CM

## 2023-06-27 DIAGNOSIS — Z23 NEED FOR PNEUMOCOCCAL VACCINATION: ICD-10-CM

## 2023-06-27 DIAGNOSIS — F40.240 CLAUSTROPHOBIA: Primary | ICD-10-CM

## 2023-06-27 DIAGNOSIS — R56.9 SEIZURES: ICD-10-CM

## 2023-06-27 DIAGNOSIS — E78.5 DYSLIPIDEMIA: ICD-10-CM

## 2023-06-27 PROCEDURE — 1159F MED LIST DOCD IN RCRD: CPT | Mod: CPTII,S$GLB,, | Performed by: PHYSICIAN ASSISTANT

## 2023-06-27 PROCEDURE — 3075F PR MOST RECENT SYSTOLIC BLOOD PRESS GE 130-139MM HG: ICD-10-PCS | Mod: CPTII,S$GLB,, | Performed by: PHYSICIAN ASSISTANT

## 2023-06-27 PROCEDURE — 3008F PR BODY MASS INDEX (BMI) DOCUMENTED: ICD-10-PCS | Mod: CPTII,S$GLB,, | Performed by: PHYSICIAN ASSISTANT

## 2023-06-27 PROCEDURE — 90471 IMMUNIZATION ADMIN: CPT | Mod: S$GLB,,, | Performed by: PHYSICIAN ASSISTANT

## 2023-06-27 PROCEDURE — 99999 PR PBB SHADOW E&M-EST. PATIENT-LVL IV: CPT | Mod: PBBFAC,,, | Performed by: PHYSICIAN ASSISTANT

## 2023-06-27 PROCEDURE — 3075F SYST BP GE 130 - 139MM HG: CPT | Mod: CPTII,S$GLB,, | Performed by: PHYSICIAN ASSISTANT

## 2023-06-27 PROCEDURE — 1160F PR REVIEW ALL MEDS BY PRESCRIBER/CLIN PHARMACIST DOCUMENTED: ICD-10-PCS | Mod: CPTII,S$GLB,, | Performed by: PHYSICIAN ASSISTANT

## 2023-06-27 PROCEDURE — 90471 TDAP VACCINE GREATER THAN OR EQUAL TO 7YO IM: ICD-10-PCS | Mod: S$GLB,,, | Performed by: PHYSICIAN ASSISTANT

## 2023-06-27 PROCEDURE — 90715 TDAP VACCINE GREATER THAN OR EQUAL TO 7YO IM: ICD-10-PCS | Mod: S$GLB,,, | Performed by: PHYSICIAN ASSISTANT

## 2023-06-27 PROCEDURE — 99214 PR OFFICE/OUTPT VISIT, EST, LEVL IV, 30-39 MIN: ICD-10-PCS | Mod: 25,S$GLB,, | Performed by: PHYSICIAN ASSISTANT

## 2023-06-27 PROCEDURE — 3008F BODY MASS INDEX DOCD: CPT | Mod: CPTII,S$GLB,, | Performed by: PHYSICIAN ASSISTANT

## 2023-06-27 PROCEDURE — 90715 TDAP VACCINE 7 YRS/> IM: CPT | Mod: S$GLB,,, | Performed by: PHYSICIAN ASSISTANT

## 2023-06-27 PROCEDURE — 99214 OFFICE O/P EST MOD 30 MIN: CPT | Mod: 25,S$GLB,, | Performed by: PHYSICIAN ASSISTANT

## 2023-06-27 PROCEDURE — 3079F DIAST BP 80-89 MM HG: CPT | Mod: CPTII,S$GLB,, | Performed by: PHYSICIAN ASSISTANT

## 2023-06-27 PROCEDURE — 1160F RVW MEDS BY RX/DR IN RCRD: CPT | Mod: CPTII,S$GLB,, | Performed by: PHYSICIAN ASSISTANT

## 2023-06-27 PROCEDURE — 99999 PR PBB SHADOW E&M-EST. PATIENT-LVL IV: ICD-10-PCS | Mod: PBBFAC,,, | Performed by: PHYSICIAN ASSISTANT

## 2023-06-27 PROCEDURE — 1159F PR MEDICATION LIST DOCUMENTED IN MEDICAL RECORD: ICD-10-PCS | Mod: CPTII,S$GLB,, | Performed by: PHYSICIAN ASSISTANT

## 2023-06-27 PROCEDURE — 3079F PR MOST RECENT DIASTOLIC BLOOD PRESSURE 80-89 MM HG: ICD-10-PCS | Mod: CPTII,S$GLB,, | Performed by: PHYSICIAN ASSISTANT

## 2023-06-27 RX ORDER — ALPRAZOLAM 0.25 MG/1
0.25 TABLET ORAL 3 TIMES DAILY PRN
Qty: 45 TABLET | Refills: 0 | Status: SHIPPED | OUTPATIENT
Start: 2023-06-27 | End: 2024-03-06 | Stop reason: SDUPTHER
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
## 2023-06-27 NOTE — PROGRESS NOTES
Patient verified by name and . Patient received Tdap vaccine in left Deltoid per patient request. Patient tolerated injection well. Patient advised to wait in clinic for 15 minutes in case of adverse reactions. Patient demonstrated understanding.

## 2023-06-27 NOTE — TELEPHONE ENCOUNTER
Appointment scheduled for today's date 6-27-23. Patient agreed to appointment date, time, and location.

## 2023-06-27 NOTE — TELEPHONE ENCOUNTER
Jesica VICTOR Staff    ----- Message from Jesica Carballo sent at 6/26/2023 10:03 AM CDT -----  Type:  Same Day Appointment Request    Caller is requesting a same day appointment.  Caller declined first available appointment listed below.      Name of Caller:  pt   When is the first available appointment?  unk  Symptoms:  rx refill   Best Call Back Number:  [phone] (home)     Additional Information:   requesting apt before  or on Friday , pt is leaving out of town please advise thank you

## 2023-07-28 ENCOUNTER — PATIENT OUTREACH (OUTPATIENT)
Dept: ADMINISTRATIVE | Facility: HOSPITAL | Age: 58
End: 2023-07-28
Payer: COMMERCIAL

## 2023-07-28 NOTE — LETTER
AUTHORIZATION FOR RELEASE OF   CONFIDENTIAL INFORMATION    Dear Dr. Grider,    We are seeing Stefano Donahue Jr., date of birth 1965, in the clinic at [facility]. Dario Ayon MD is the patient's PCP. Stefano Donahue JrMac has an outstanding lab/procedure at the time we reviewed his chart. In order to help keep his health information updated, he has authorized us to request the following medical record(s):                                           ( X )  COLONOSCOPY             Please fax records to [facility], Robert W Taylor, MD, [phone]    Thank you in advance,  Joellen          Patient Name: Stefano Donahue Jr.  : 1965  Patient Phone #: [phone]

## 2023-07-28 NOTE — PROGRESS NOTES
Population Health Chart Review & Patient Outreach Details:     Reason for Outreach Encounter:     []  Non-Compliant Report   []  Payor Report ([employer], PHN, BCBS, MSSP, MCIP, UHC, etc.)   []  Pre-Visit Chart Review     Updates Requested / Reviewed:     []  Care Everywhere    []     []  External Sources ([facility], [facility], DIS, etc.)   [x]  Care Team Updated    Patient Outreach Method:    []  Telephone Outreach Completed   [] Successful   [] Left Voicemail   [] Unable to Contact (wrong number, no voicemail)  []  [facility] Portal Outreach Sent  []  Letter Outreach Mailed  [x]  Fax Sent for External Records  []  External Records Upload    Health Maintenance Topics Addressed and Outreach Outcomes / Actions Taken:        []      Breast Cancer Screening []  Mammo Scheduled      []  External Records Requested     []  Added Reminder to Complete to Upcoming Primary Care Appt Notes     []  Patient Declined     []  Patient Will Call Back to Schedule     []  Patient Will Schedule with External Provider / Order Routed if Applicable             []       Cervical Cancer Screening []  Pap Scheduled      []  External Records Requested     []  Added Reminder to Complete to Upcoming Primary Care Appt Notes     []  Patient Declined     []  Patient Will Call Back to Schedule     []  Patient Will Schedule with External Provider               [x]          Colorectal Cancer Screening []  Colonoscopy Case Request or Referral Placed     [x]  External Records Requested     []  Added Reminder to Complete to Upcoming Primary Care Appt Notes     []  Patient Declined     []  Patient Will Call Back to Schedule     []  Patient Will Schedule with External Provider     []  Fit Kit Mailed (add the SmartPhrase under additional notes)     []  Reminded Patient to Complete Home Test             []      Diabetic Eye Exam []  Eye Camera Scheduled or Optometry Referral Placed     []  External Records Requested     []  Added Reminder to Complete to  Upcoming Primary Care Appt Notes     []  Patient Declined     []  Patient Will Call Back to Schedule     []  Patient Will Schedule with External Provider             []      Blood Pressure Control []  Primary Care Follow Up Visit Scheduled     []  Remote Blood Pressure Reading Captured     []  Added Reminder to Complete to Upcoming Primary Care Appt Notes     []  Patient Declined     []  Patient Will Call Back / Patient Will Send Portal Message with Reading     []  Patient Will Call Back to Schedule Provider Visit             []       HbA1c & Other Labs []  Lab Appt Scheduled for Due Labs     []  Primary Care Follow Up Visit Scheduled      []  Reminded Patient to Complete Home Test     []  Added Reminder to Complete to Upcoming Primary Care Appt Notes     []  Patient Declined     []  Patient Will Call Back to Schedule     []  Patient Will Schedule with External Provider / Order Routed if Applicable           []    Schedule Primary Care Appt []  Primary Care Appt Scheduled     []  Patient Declined     []  Patient Will Call Back to Schedule     []  Pt Established with External Provider & Updated Care Team             []      Medication Adherence []  Primary Care Appointment Scheduled     []  Added Reminder to Upcoming Primary Care Appt Notes     []  Patient Reminded to  Prescription     []  Patient Declined, Provider Notified if Needed     []  Sent Provider Message to Review and/or Add Exclusion to Problem List             []      Osteoporosis Screening []  DXA Appointment Scheduled     []  External Records Requested     []  Added Reminder to Complete to Upcoming Primary Care Appt Notes     []  Patient Declined     []  Patient Will Call Back to Schedule     []  Patient Will Schedule with External Provider / Order Routed if Applicable     Additional Care Coordinator Notes:     Request for colonoscopy sent to Dr. Grider.    Further Action Needed If Patient Returns Outreach:

## 2023-08-08 LAB — CRC RECOMMENDATION EXT: NORMAL

## 2023-08-28 ENCOUNTER — PATIENT OUTREACH (OUTPATIENT)
Dept: ADMINISTRATIVE | Facility: HOSPITAL | Age: 58
End: 2023-08-28
Payer: COMMERCIAL

## 2023-11-09 ENCOUNTER — OFFICE VISIT (OUTPATIENT)
Dept: FAMILY MEDICINE | Facility: CLINIC | Age: 58
End: 2023-11-09
Attending: FAMILY MEDICINE
Payer: COMMERCIAL

## 2023-11-09 VITALS
BODY MASS INDEX: 28.84 KG/M2 | SYSTOLIC BLOOD PRESSURE: 134 MMHG | HEIGHT: 69 IN | DIASTOLIC BLOOD PRESSURE: 76 MMHG | TEMPERATURE: 98 F | HEART RATE: 67 BPM | RESPIRATION RATE: 17 BRPM | WEIGHT: 194.69 LBS | OXYGEN SATURATION: 97 %

## 2023-11-09 DIAGNOSIS — F40.240 CLAUSTROPHOBIA: ICD-10-CM

## 2023-11-09 DIAGNOSIS — E78.5 HYPERLIPIDEMIA, UNSPECIFIED HYPERLIPIDEMIA TYPE: ICD-10-CM

## 2023-11-09 DIAGNOSIS — G40.909 NONINTRACTABLE EPILEPSY WITHOUT STATUS EPILEPTICUS, UNSPECIFIED EPILEPSY TYPE: ICD-10-CM

## 2023-11-09 DIAGNOSIS — D18.03 HEMANGIOMA OF LIVER: ICD-10-CM

## 2023-11-09 DIAGNOSIS — Z00.00 ENCOUNTER FOR PREVENTIVE HEALTH EXAMINATION: Primary | ICD-10-CM

## 2023-11-09 DIAGNOSIS — J30.9 ALLERGIC RHINITIS, UNSPECIFIED SEASONALITY, UNSPECIFIED TRIGGER: ICD-10-CM

## 2023-11-09 DIAGNOSIS — F41.9 ANXIETY: ICD-10-CM

## 2023-11-09 DIAGNOSIS — J45.20 MILD INTERMITTENT REACTIVE AIRWAY DISEASE WITHOUT COMPLICATION: ICD-10-CM

## 2023-11-09 PROCEDURE — 3008F PR BODY MASS INDEX (BMI) DOCUMENTED: ICD-10-PCS | Mod: CPTII,S$GLB,, | Performed by: FAMILY MEDICINE

## 2023-11-09 PROCEDURE — 99999 PR PBB SHADOW E&M-EST. PATIENT-LVL IV: CPT | Mod: PBBFAC,,, | Performed by: FAMILY MEDICINE

## 2023-11-09 PROCEDURE — 1160F RVW MEDS BY RX/DR IN RCRD: CPT | Mod: CPTII,S$GLB,, | Performed by: FAMILY MEDICINE

## 2023-11-09 PROCEDURE — 3008F BODY MASS INDEX DOCD: CPT | Mod: CPTII,S$GLB,, | Performed by: FAMILY MEDICINE

## 2023-11-09 PROCEDURE — 99396 PREV VISIT EST AGE 40-64: CPT | Mod: S$GLB,,, | Performed by: FAMILY MEDICINE

## 2023-11-09 PROCEDURE — 3078F PR MOST RECENT DIASTOLIC BLOOD PRESSURE < 80 MM HG: ICD-10-PCS | Mod: CPTII,S$GLB,, | Performed by: FAMILY MEDICINE

## 2023-11-09 PROCEDURE — 99999 PR PBB SHADOW E&M-EST. PATIENT-LVL IV: ICD-10-PCS | Mod: PBBFAC,,, | Performed by: FAMILY MEDICINE

## 2023-11-09 PROCEDURE — 3075F PR MOST RECENT SYSTOLIC BLOOD PRESS GE 130-139MM HG: ICD-10-PCS | Mod: CPTII,S$GLB,, | Performed by: FAMILY MEDICINE

## 2023-11-09 PROCEDURE — 1159F PR MEDICATION LIST DOCUMENTED IN MEDICAL RECORD: ICD-10-PCS | Mod: CPTII,S$GLB,, | Performed by: FAMILY MEDICINE

## 2023-11-09 PROCEDURE — 1160F PR REVIEW ALL MEDS BY PRESCRIBER/CLIN PHARMACIST DOCUMENTED: ICD-10-PCS | Mod: CPTII,S$GLB,, | Performed by: FAMILY MEDICINE

## 2023-11-09 PROCEDURE — 3075F SYST BP GE 130 - 139MM HG: CPT | Mod: CPTII,S$GLB,, | Performed by: FAMILY MEDICINE

## 2023-11-09 PROCEDURE — 3078F DIAST BP <80 MM HG: CPT | Mod: CPTII,S$GLB,, | Performed by: FAMILY MEDICINE

## 2023-11-09 PROCEDURE — 1159F MED LIST DOCD IN RCRD: CPT | Mod: CPTII,S$GLB,, | Performed by: FAMILY MEDICINE

## 2023-11-09 PROCEDURE — 99396 PR PREVENTIVE VISIT,EST,40-64: ICD-10-PCS | Mod: S$GLB,,, | Performed by: FAMILY MEDICINE

## 2023-11-09 RX ORDER — OMEGA-3 FATTY ACIDS CAP 1000 MG 1000 MG
CAP ORAL 4 TIMES DAILY
COMMUNITY
Start: 2023-09-09
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
## 2023-11-13 ENCOUNTER — PATIENT OUTREACH (OUTPATIENT)
Dept: ADMINISTRATIVE | Facility: HOSPITAL | Age: 58
End: 2023-11-13
Payer: COMMERCIAL

## 2023-11-13 NOTE — PROGRESS NOTES
Population Health Chart Review & Patient Outreach Details    Outreach Performed: NO    Additional Pop Health Notes:           Updates Requested / Reviewed:      Updated Care Coordination Note, Care Everywhere, Care Team Updated, and Immunizations Reconciliation Completed or Queried: [place]         Health Maintenance Topics Overdue:    Health Maintenance Due   Topic Date Due    Pneumococcal Vaccines (Age 0-64) (1 - PCV) Never done    HIV Screening  Never done    Shingles Vaccine (1 of 2) Never done    RSV Vaccine (Age 60+) (1 - 1-dose 60+ series) Never done    Influenza Vaccine (1) 09/01/2023    COVID-19 Vaccine (3 - 2023-24 season) 09/01/2023         Health Maintenance Topic(s) Outreach Outcomes & Actions Taken:

## 2024-02-27 LAB
CHOLEST SERPL-MSCNC: 157 MG/DL (ref 0–200)
COMPLEXED PSA SERPL-MCNC: 0.84 NG/ML
HBA1C MFR BLD: 5.8 % (ref 4–6)
HDLC SERPL-MCNC: 47 MG/DL (ref 35–70)
LDLC SERPL CALC-MCNC: 96 MG/DL (ref 0–160)
TRIGL SERPL-MCNC: 70 MG/DL (ref 40–160)

## 2024-03-06 ENCOUNTER — OFFICE VISIT (OUTPATIENT)
Dept: FAMILY MEDICINE | Facility: CLINIC | Age: 59
End: 2024-03-06
Payer: COMMERCIAL

## 2024-03-06 VITALS
WEIGHT: 197.88 LBS | TEMPERATURE: 98 F | SYSTOLIC BLOOD PRESSURE: 132 MMHG | DIASTOLIC BLOOD PRESSURE: 74 MMHG | BODY MASS INDEX: 29.31 KG/M2 | HEART RATE: 63 BPM | HEIGHT: 69 IN | OXYGEN SATURATION: 97 %

## 2024-03-06 DIAGNOSIS — F40.240 CLAUSTROPHOBIA: ICD-10-CM

## 2024-03-06 DIAGNOSIS — G40.909 NONINTRACTABLE EPILEPSY WITHOUT STATUS EPILEPTICUS, UNSPECIFIED EPILEPSY TYPE: ICD-10-CM

## 2024-03-06 DIAGNOSIS — F41.9 ANXIETY: ICD-10-CM

## 2024-03-06 PROCEDURE — 3008F BODY MASS INDEX DOCD: CPT | Mod: CPTII,S$GLB,, | Performed by: PHYSICIAN ASSISTANT

## 2024-03-06 PROCEDURE — 3075F SYST BP GE 130 - 139MM HG: CPT | Mod: CPTII,S$GLB,, | Performed by: PHYSICIAN ASSISTANT

## 2024-03-06 PROCEDURE — 99999 PR PBB SHADOW E&M-EST. PATIENT-LVL III: CPT | Mod: PBBFAC,,, | Performed by: PHYSICIAN ASSISTANT

## 2024-03-06 PROCEDURE — 99213 OFFICE O/P EST LOW 20 MIN: CPT | Mod: S$GLB,,, | Performed by: PHYSICIAN ASSISTANT

## 2024-03-06 PROCEDURE — 1159F MED LIST DOCD IN RCRD: CPT | Mod: CPTII,S$GLB,, | Performed by: PHYSICIAN ASSISTANT

## 2024-03-06 PROCEDURE — 3078F DIAST BP <80 MM HG: CPT | Mod: CPTII,S$GLB,, | Performed by: PHYSICIAN ASSISTANT

## 2024-03-06 RX ORDER — CALCIUM CARBONATE 300MG(750)
400 TABLET,CHEWABLE ORAL DAILY
COMMUNITY
Start: 2024-03-01

## 2024-03-06 RX ORDER — GLUCOSAM/CHONDRO/HERB 149/HYAL 750-100 MG
2000 TABLET ORAL DAILY
COMMUNITY
Start: 2023-09-01 | End: 2024-03-06 | Stop reason: SDUPTHER

## 2024-03-06 RX ORDER — CHOLECALCIFEROL (VITAMIN D3) 25 MCG
1000 TABLET ORAL DAILY
COMMUNITY
Start: 2024-03-01

## 2024-03-06 RX ORDER — LANOLIN ALCOHOL/MO/W.PET/CERES
100 CREAM (GRAM) TOPICAL DAILY
COMMUNITY
Start: 2024-03-01

## 2024-03-06 RX ORDER — ZINC SULFATE 50(220)MG
50 CAPSULE ORAL DAILY
COMMUNITY
Start: 2024-03-01

## 2024-03-06 RX ORDER — ALPRAZOLAM 0.25 MG/1
0.25 TABLET ORAL 3 TIMES DAILY PRN
Qty: 45 TABLET | Refills: 0 | Status: SHIPPED | OUTPATIENT
Start: 2024-03-06 | End: 2024-04-05

## 2024-03-06 NOTE — PATIENT INSTRUCTIONS
Davie Agarwal,     If you are due for any health screening(s) below please notify me so we can arrange them to be ordered and scheduled. Most healthy patients at your age complete them, but you are free to accept or refuse.     If you can't do it, I'll definitely understand. If you can, I'd certainly appreciate it!    All of your core healthy metrics are met.

## 2024-03-06 NOTE — PROGRESS NOTES
"Subjective     Patient ID: Stefano Donahue Jr. is a 59 y.o. male.    Chief Complaint: Medication Refill    Patient is getting ready to fly to [place] for 4 weeks for his work.  Patient does have claustrophobia and does require Xanax to be able to fly.  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  [facility] and we will get these results imported into his chart.  No other complaints today.      Review of Systems   Constitutional:  Negative for activity change, appetite change, chills, fatigue, fever and unexpected weight change.   HENT: Negative.     Eyes:  Negative for photophobia, pain, discharge, redness and visual disturbance.   Respiratory:  Negative for cough, chest tightness and shortness of breath.    Cardiovascular:  Negative for chest pain, palpitations and leg swelling.   Gastrointestinal:  Negative for abdominal distention, abdominal pain, blood in stool, constipation, diarrhea, nausea and vomiting.   Genitourinary:  Negative for decreased urine volume, difficulty urinating, dysuria, frequency, hematuria and urgency.   Musculoskeletal:  Negative for arthralgias, back pain, gait problem and myalgias.   Neurological:  Negative for dizziness, weakness, light-headedness, numbness and headaches.   Psychiatric/Behavioral:  Negative for agitation, behavioral problems, confusion, decreased concentration, dysphoric mood, hallucinations, self-injury, sleep disturbance and suicidal ideas. The patient is nervous/anxious. The patient is not hyperactive.           Objective   Vitals:    03/06/24 1323   BP: 132/74   BP Location: Left arm   Patient Position: Sitting   BP Method: Large (Manual)   Pulse: 63   Temp: 98 °F (36.7 °C)   TempSrc: Oral   SpO2: 97%   Weight: 89.8 kg (197 lb 13.8 oz)   Height: 5' 9" (1.753 m)      Physical Exam  Constitutional:       General: He is not in acute distress.     Appearance: Normal appearance. He is well-developed. He is not " diaphoretic.   HENT:      Head: Normocephalic and atraumatic.   Eyes:      Conjunctiva/sclera: Conjunctivae normal.      Pupils: Pupils are equal, round, and reactive to light.   Neck:      Thyroid: No thyromegaly.      Vascular: No JVD.   Cardiovascular:      Rate and Rhythm: Normal rate and regular rhythm.      Heart sounds: No murmur heard.     No friction rub. No gallop.   Pulmonary:      Effort: Pulmonary effort is normal. No respiratory distress.      Breath sounds: No wheezing or rales.   Chest:      Chest wall: No tenderness.   Musculoskeletal:      Cervical back: Normal range of motion and neck supple.   Neurological:      Mental Status: He is alert and oriented to person, place, and time.            Assessment and Plan     1. Claustrophobia  Comments:  Xanax refilled for his flight.  Orders:  -     ALPRAZolam (XANAX) 0.25 MG tablet; Take 1 tablet (0.25 mg total) by mouth 3 (three) times daily as needed for Anxiety.  Dispense: 45 tablet; Refill: 0    2. Anxiety  Comments:  Controlled, continue current regimen  Orders:  -     ALPRAZolam (XANAX) 0.25 MG tablet; Take 1 tablet (0.25 mg total) by mouth 3 (three) times daily as needed for Anxiety.  Dispense: 45 tablet; Refill: 0    3. Nonintractable epilepsy without status epilepticus, unspecified epilepsy type  Comments:  Controlled, continue current regimen               No follow-ups on file.

## 2024-03-11 ENCOUNTER — PATIENT OUTREACH (OUTPATIENT)
Dept: ADMINISTRATIVE | Facility: HOSPITAL | Age: 59
End: 2024-03-11
Payer: COMMERCIAL

## 2024-03-11 NOTE — PROGRESS NOTES
Population Health Chart Review & Patient Outreach Details      Additional [facility] Health Notes:               Updates Requested / Reviewed:      Updated Care Coordination Note         Health Maintenance Topics Overdue:      VB Score: 0     Patient is not due for any topics at this time.    Influenza Vaccine  Pneumonia Vaccine  RSV Vaccine                  Health Maintenance Topic(s) Outreach Outcomes & Actions Taken:    Lab(s) - Outreach Outcomes & Actions Taken  : External Records Uploaded & Care Team Updated if Applicable

## 2024-10-07 NOTE — CARE UPDATE
01/08/22 0817   Patient Assessment/Suction   Level of Consciousness (AVPU) alert   Respiratory Effort Normal;Unlabored   Expansion/Accessory Muscles/Retractions expansion symmetric;no retractions;no use of accessory muscles   All Lung Fields Breath Sounds diminished;clear   Rhythm/Pattern, Respiratory unlabored   Cough Frequency infrequent   Cough Type dry;nonproductive   PRE-TX-O2   O2 Device (Oxygen Therapy) High Flow nasal Cannula   $ Is the patient on Low Flow Oxygen? Yes   Flow (L/min) (S)  12   SpO2 (!) 93 %   Pulse Oximetry Type Continuous   $ Pulse Oximetry - Multiple Charge Pulse Oximetry - Multiple   Pulse 64   Resp 18   Positioning Sitting on edge of bed (dangling)   Inhaler   $ Inhaler Charges MDI (Metered Dose Inahler) Treatment;Given With Spacer;Mouth rinsed post treatment   Daily Review of Necessity (Inhaler) completed   Respiratory Treatment Status (Inhaler) given   Treatment Route (Inhaler) breath hold;spacer/holding chamber   Patient Position (Inhaler) sitting on edge of bed   Post Treatment Assessment (Inhaler) breath sounds unchanged   Signs of Intolerance (Inhaler) none   Breath Sounds Post-Respiratory Treatment   Throughout All Fields Post-Treatment All Fields   Throughout All Fields Post-Treatment no change   Post-treatment Heart Rate (beats/min) 86   Post-treatment Resp Rate (breaths/min) 20   Incentive Spirometer   $ Incentive Spirometer Charges done with encouragement   Administration (IS) self-administered   Number of Repetitions (IS) 10   Level Incentive Spirometer (mL) 1500   Patient Tolerance (IS) good      Pt was noted to have abnormal values on a sma 7 with low glucose.

## 2025-04-01 NOTE — PLAN OF CARE
[facility]  Initial Discharge Assessment       Primary Care Provider: Dr. Dario Ayon    Admission Diagnosis: Pneumonia due to COVID-19 virus [U07.1, J12.82]  COVID-19 [U07.1]    Admission Date: 1/5/2022  Expected Discharge Date:     Discharge Barriers Identified: None    Payor: BLUE CROSS BLUE SHIELD / Plan: [employer] [place] PPO / Product Type: PPO /     Extended Emergency Contact Information  Primary Emergency Contact: Kandis Donahue  Address: [address]           [place] [address] [facility]  Home Phone: [phone]  Mobile Phone: [phone]  Relation: Spouse  Preferred language: English   needed? No    Discharge Plan A: Home  Discharge Plan B: Home with family      [id number] - [place], [place] - [address]  [address]  [place] [address]  Phone: [phone] Fax: [phone]    SW spoke with patient and patient's spouse Kandis Donahue via telephone to complete discharge planning assessment.  Patient alert and oriented xs 4.  Patient verified all demographic information on facesheet is correct.  Patient verified PCP is Dr. Ayon.  Patient verified primary health insurance is BCBS.  Patient with NO home health or DME.  Patient with POA (spouse) but no Living Will.  Patient not on dialysis or medication coumadin.  Patient with no 30 day admission.  Patient with no financial issues at this time.  Patient family will provide transportation upon discharge from facility.  Patient independent with ADLs, live with spouse, drives self.      Initial Assessment (most recent)     Adult Discharge Assessment - 01/05/22 1125        Discharge Assessment    Assessment Type Discharge Planning Assessment     Confirmed/corrected address, phone number and insurance Yes     Confirmed Demographics Correct on Facesheet     Source of Information patient;family     Communicated NEL with patient/caregiver Date not available/Unable to determine     Lives With spouse      Facility Arrived From: home     Do you expect to return to your current living situation? No     Do you have help at home or someone to help you manage your care at home? Yes     Who are your caregiver(s) and their phone number(s)? spouse     Prior to hospitilization cognitive status: Alert/Oriented     Current cognitive status: Alert/Oriented     Walking or Climbing Stairs Difficulty none     Dressing/Bathing Difficulty none     Equipment Currently Used at Home none     Readmission within 30 days? No     Patient currently being followed by outpatient case management? No     Do you currently have service(s) that help you manage your care at home? No     Do you take prescription medications? Yes     Do you have prescription coverage? Yes     Do you have any problems affording any of your prescribed medications? No     Is the patient taking medications as prescribed? yes     Who is going to help you get home at discharge? spouse     How do you get to doctors appointments? car, drives self     Are you on dialysis? No     Do you take coumadin? No     Discharge Plan A Home     Discharge Plan B Home with family     DME Needed Upon Discharge  none     Discharge Plan discussed with: Spouse/sig other;Patient     Discharge Barriers Identified None                           Male